# Patient Record
Sex: MALE | Race: WHITE | Employment: OTHER | URBAN - METROPOLITAN AREA
[De-identification: names, ages, dates, MRNs, and addresses within clinical notes are randomized per-mention and may not be internally consistent; named-entity substitution may affect disease eponyms.]

---

## 2017-01-03 ENCOUNTER — HOSPITAL ENCOUNTER (OUTPATIENT)
Dept: WOUND CARE | Age: 67
Discharge: HOME | End: 2017-01-03
Attending: PODIATRIST | Admitting: PODIATRIST

## 2017-01-05 ENCOUNTER — HOSPITAL ENCOUNTER (OUTPATIENT)
Dept: WOUND CARE | Age: 67
Discharge: HOME | End: 2017-01-05
Attending: PODIATRIST | Admitting: PODIATRIST

## 2017-01-05 RX ORDER — LIDOCAINE HYDROCHLORIDE 40 MG/ML
SOLUTION TOPICAL ONCE
Status: COMPLETED | OUTPATIENT
Start: 2017-01-05 | End: 2017-01-05

## 2017-01-05 RX ADMIN — LIDOCAINE HYDROCHLORIDE: 40 SOLUTION TOPICAL at 12:58

## 2017-01-09 ENCOUNTER — HOSPITAL ENCOUNTER (OUTPATIENT)
Dept: WOUND CARE | Age: 67
Discharge: HOME | End: 2017-01-09
Attending: PODIATRIST | Admitting: PODIATRIST

## 2017-01-12 ENCOUNTER — HOSPITAL ENCOUNTER (OUTPATIENT)
Dept: WOUND CARE | Age: 67
Discharge: HOME | End: 2017-01-12
Attending: PODIATRIST | Admitting: PODIATRIST

## 2017-01-12 VITALS
SYSTOLIC BLOOD PRESSURE: 134 MMHG | RESPIRATION RATE: 18 BRPM | HEART RATE: 69 BPM | DIASTOLIC BLOOD PRESSURE: 84 MMHG | TEMPERATURE: 97.9 F

## 2017-01-12 RX ORDER — LIDOCAINE HYDROCHLORIDE 40 MG/ML
SOLUTION TOPICAL ONCE
Status: COMPLETED | OUTPATIENT
Start: 2017-01-12 | End: 2017-01-12

## 2017-01-12 RX ADMIN — LIDOCAINE HYDROCHLORIDE: 40 SOLUTION TOPICAL at 13:29

## 2017-01-12 ASSESSMENT — PAIN DESCRIPTION - DESCRIPTORS: DESCRIPTORS: BURNING

## 2017-01-12 ASSESSMENT — PAIN DESCRIPTION - LOCATION: LOCATION: ANKLE

## 2017-01-12 ASSESSMENT — PAIN DESCRIPTION - ORIENTATION: ORIENTATION: LEFT

## 2017-01-12 ASSESSMENT — PAIN SCALES - GENERAL: PAINLEVEL_OUTOF10: 1

## 2017-01-16 ENCOUNTER — TELEPHONE (OUTPATIENT)
Dept: WOUND CARE | Age: 67
End: 2017-01-16

## 2017-01-19 ENCOUNTER — HOSPITAL ENCOUNTER (OUTPATIENT)
Dept: WOUND CARE | Age: 67
Discharge: HOME | End: 2017-01-19
Attending: PODIATRIST | Admitting: PODIATRIST

## 2017-01-19 VITALS
TEMPERATURE: 98.1 F | DIASTOLIC BLOOD PRESSURE: 85 MMHG | SYSTOLIC BLOOD PRESSURE: 131 MMHG | RESPIRATION RATE: 18 BRPM | HEART RATE: 61 BPM

## 2017-01-19 LAB
GLUCOSE BLD-MCNC: 97 MG/DL (ref 70–99)
PERFORMED ON: NORMAL

## 2017-01-19 RX ORDER — LIDOCAINE HYDROCHLORIDE 40 MG/ML
SOLUTION TOPICAL ONCE
Status: COMPLETED | OUTPATIENT
Start: 2017-01-19 | End: 2017-01-19

## 2017-01-19 RX ORDER — ACETAMINOPHEN 325 MG/1
650 TABLET ORAL EVERY 6 HOURS PRN
COMMUNITY
End: 2021-08-26

## 2017-01-19 RX ADMIN — LIDOCAINE HYDROCHLORIDE: 40 SOLUTION TOPICAL at 13:37

## 2017-01-19 ASSESSMENT — PAIN DESCRIPTION - PAIN TYPE: TYPE: ACUTE PAIN

## 2017-01-19 ASSESSMENT — PAIN DESCRIPTION - DESCRIPTORS: DESCRIPTORS: SHARP;SHOOTING

## 2017-01-19 ASSESSMENT — PAIN DESCRIPTION - ORIENTATION: ORIENTATION: LEFT

## 2017-01-19 ASSESSMENT — PAIN DESCRIPTION - LOCATION: LOCATION: ANKLE

## 2017-01-19 ASSESSMENT — PAIN SCALES - GENERAL: PAINLEVEL_OUTOF10: 10

## 2017-01-20 PROBLEM — L03.116 CELLULITIS OF LEFT ANKLE: Status: ACTIVE | Noted: 2017-01-20

## 2017-01-20 PROBLEM — L97.322 CHRONIC ULCER OF LEFT ANKLE WITH FAT LAYER EXPOSED (HCC): Status: ACTIVE | Noted: 2017-01-20

## 2017-01-23 ENCOUNTER — CARE COORDINATION (OUTPATIENT)
Dept: CASE MANAGEMENT | Age: 67
End: 2017-01-23

## 2017-01-23 ENCOUNTER — TELEPHONE (OUTPATIENT)
Dept: PHARMACY | Facility: CLINIC | Age: 67
End: 2017-01-23

## 2017-01-26 ENCOUNTER — HOSPITAL ENCOUNTER (OUTPATIENT)
Dept: WOUND CARE | Age: 67
Discharge: HOME | End: 2017-01-26
Attending: PODIATRIST | Admitting: PODIATRIST

## 2017-01-26 VITALS
RESPIRATION RATE: 18 BRPM | DIASTOLIC BLOOD PRESSURE: 113 MMHG | SYSTOLIC BLOOD PRESSURE: 178 MMHG | TEMPERATURE: 97.7 F | HEART RATE: 57 BPM

## 2017-01-26 RX ORDER — LIDOCAINE HYDROCHLORIDE 40 MG/ML
SOLUTION TOPICAL ONCE
Status: COMPLETED | OUTPATIENT
Start: 2017-01-26 | End: 2017-01-26

## 2017-01-26 RX ADMIN — LIDOCAINE HYDROCHLORIDE: 40 SOLUTION TOPICAL at 13:15

## 2017-01-26 ASSESSMENT — PAIN DESCRIPTION - PROGRESSION: CLINICAL_PROGRESSION: GRADUALLY IMPROVING

## 2017-01-26 ASSESSMENT — PAIN DESCRIPTION - PAIN TYPE: TYPE: ACUTE PAIN

## 2017-01-26 ASSESSMENT — PAIN DESCRIPTION - FREQUENCY: FREQUENCY: CONTINUOUS

## 2017-01-26 ASSESSMENT — PAIN SCALES - GENERAL: PAINLEVEL_OUTOF10: 4

## 2017-01-26 ASSESSMENT — PAIN DESCRIPTION - LOCATION: LOCATION: ANKLE

## 2017-01-26 ASSESSMENT — PAIN DESCRIPTION - ORIENTATION: ORIENTATION: LEFT

## 2017-01-27 ENCOUNTER — CARE COORDINATION (OUTPATIENT)
Dept: CASE MANAGEMENT | Age: 67
End: 2017-01-27

## 2017-01-30 ENCOUNTER — HOSPITAL ENCOUNTER (OUTPATIENT)
Dept: WOUND CARE | Age: 67
Discharge: HOME | End: 2017-01-30
Attending: PODIATRIST | Admitting: PODIATRIST

## 2017-01-31 ENCOUNTER — CARE COORDINATION (OUTPATIENT)
Dept: CASE MANAGEMENT | Age: 67
End: 2017-01-31

## 2017-02-02 ENCOUNTER — HOSPITAL ENCOUNTER (OUTPATIENT)
Dept: WOUND CARE | Age: 67
Discharge: HOME | End: 2017-02-02
Attending: PODIATRIST | Admitting: PODIATRIST

## 2017-02-02 VITALS
RESPIRATION RATE: 16 BRPM | SYSTOLIC BLOOD PRESSURE: 148 MMHG | TEMPERATURE: 97.7 F | DIASTOLIC BLOOD PRESSURE: 77 MMHG | HEART RATE: 51 BPM

## 2017-02-02 RX ORDER — LIDOCAINE HYDROCHLORIDE 40 MG/ML
SOLUTION TOPICAL ONCE
Status: COMPLETED | OUTPATIENT
Start: 2017-02-02 | End: 2017-02-02

## 2017-02-02 RX ADMIN — LIDOCAINE HYDROCHLORIDE: 40 SOLUTION TOPICAL at 13:17

## 2017-02-02 ASSESSMENT — PAIN DESCRIPTION - PAIN TYPE: TYPE: ACUTE PAIN

## 2017-02-02 ASSESSMENT — PAIN DESCRIPTION - ORIENTATION: ORIENTATION: LEFT

## 2017-02-02 ASSESSMENT — PAIN SCALES - GENERAL: PAINLEVEL_OUTOF10: 4

## 2017-02-02 ASSESSMENT — PAIN DESCRIPTION - ONSET: ONSET: ON-GOING

## 2017-02-02 ASSESSMENT — PAIN DESCRIPTION - PROGRESSION: CLINICAL_PROGRESSION: GRADUALLY IMPROVING

## 2017-02-02 ASSESSMENT — PAIN DESCRIPTION - FREQUENCY: FREQUENCY: INTERMITTENT

## 2017-02-02 ASSESSMENT — PAIN DESCRIPTION - LOCATION: LOCATION: ANKLE

## 2017-02-06 ENCOUNTER — CARE COORDINATION (OUTPATIENT)
Dept: CASE MANAGEMENT | Age: 67
End: 2017-02-06

## 2017-02-09 ENCOUNTER — HOSPITAL ENCOUNTER (OUTPATIENT)
Dept: WOUND CARE | Age: 67
Discharge: HOME | End: 2017-02-09
Attending: PODIATRIST | Admitting: PODIATRIST

## 2017-02-09 VITALS
SYSTOLIC BLOOD PRESSURE: 156 MMHG | TEMPERATURE: 97.8 F | RESPIRATION RATE: 18 BRPM | DIASTOLIC BLOOD PRESSURE: 86 MMHG | HEART RATE: 53 BPM

## 2017-02-09 ASSESSMENT — PAIN DESCRIPTION - ORIENTATION: ORIENTATION: LEFT

## 2017-02-09 ASSESSMENT — PAIN DESCRIPTION - DESCRIPTORS: DESCRIPTORS: BURNING

## 2017-02-09 ASSESSMENT — PAIN DESCRIPTION - ONSET: ONSET: ON-GOING

## 2017-02-09 ASSESSMENT — PAIN DESCRIPTION - PROGRESSION: CLINICAL_PROGRESSION: NOT CHANGED

## 2017-02-09 ASSESSMENT — PAIN DESCRIPTION - FREQUENCY: FREQUENCY: INTERMITTENT

## 2017-02-09 ASSESSMENT — PAIN DESCRIPTION - PAIN TYPE: TYPE: ACUTE PAIN

## 2017-02-09 ASSESSMENT — PAIN SCALES - GENERAL: PAINLEVEL_OUTOF10: 6

## 2017-02-09 ASSESSMENT — PAIN DESCRIPTION - LOCATION: LOCATION: ANKLE

## 2017-02-13 ENCOUNTER — TELEPHONE (OUTPATIENT)
Dept: PULMONOLOGY | Age: 67
End: 2017-02-13

## 2017-03-03 ENCOUNTER — TELEPHONE (OUTPATIENT)
Dept: INTERNAL MEDICINE | Age: 67
End: 2017-03-03

## 2017-03-06 ENCOUNTER — OFFICE VISIT (OUTPATIENT)
Dept: INTERNAL MEDICINE | Age: 67
End: 2017-03-06

## 2017-03-06 VITALS — BODY MASS INDEX: 30 KG/M2 | SYSTOLIC BLOOD PRESSURE: 124 MMHG | DIASTOLIC BLOOD PRESSURE: 80 MMHG | WEIGHT: 253 LBS

## 2017-03-06 DIAGNOSIS — G60.9 IDIOPATHIC PERIPHERAL NEUROPATHY: ICD-10-CM

## 2017-03-06 DIAGNOSIS — G62.9 PERIPHERAL POLYNEUROPATHY: Primary | ICD-10-CM

## 2017-03-06 DIAGNOSIS — L97.529 FOOT ULCER, LEFT, WITH UNSPECIFIED SEVERITY (HCC): ICD-10-CM

## 2017-03-06 DIAGNOSIS — I15.9 SECONDARY HYPERTENSION: ICD-10-CM

## 2017-03-06 PROCEDURE — 99213 OFFICE O/P EST LOW 20 MIN: CPT | Performed by: INTERNAL MEDICINE

## 2017-03-06 RX ORDER — GENTAMICIN SULFATE 1 MG/G
OINTMENT TOPICAL
Qty: 1 TUBE | Refills: 5 | Status: SHIPPED | OUTPATIENT
Start: 2017-03-06 | End: 2017-03-13

## 2017-03-06 RX ORDER — GABAPENTIN 100 MG/1
100 CAPSULE ORAL 3 TIMES DAILY
COMMUNITY
End: 2017-08-25 | Stop reason: CLARIF

## 2017-03-06 RX ORDER — FUROSEMIDE 20 MG/1
20 TABLET ORAL DAILY
Qty: 30 TABLET | Refills: 3 | Status: SHIPPED | OUTPATIENT
Start: 2017-03-06 | End: 2017-08-31 | Stop reason: SDUPTHER

## 2017-03-15 ENCOUNTER — TELEPHONE (OUTPATIENT)
Dept: INTERNAL MEDICINE | Age: 67
End: 2017-03-15

## 2017-03-20 ENCOUNTER — TELEPHONE (OUTPATIENT)
Dept: INTERNAL MEDICINE | Age: 67
End: 2017-03-20

## 2017-05-26 ENCOUNTER — TELEPHONE (OUTPATIENT)
Dept: INTERNAL MEDICINE | Age: 67
End: 2017-05-26

## 2017-05-30 ENCOUNTER — TELEPHONE (OUTPATIENT)
Dept: INTERNAL MEDICINE | Age: 67
End: 2017-05-30

## 2017-06-06 ENCOUNTER — TELEPHONE (OUTPATIENT)
Dept: INTERNAL MEDICINE | Age: 67
End: 2017-06-06

## 2017-06-12 ENCOUNTER — OFFICE VISIT (OUTPATIENT)
Dept: ENT CLINIC | Age: 67
End: 2017-06-12

## 2017-06-12 VITALS
WEIGHT: 261 LBS | TEMPERATURE: 97.6 F | SYSTOLIC BLOOD PRESSURE: 116 MMHG | HEIGHT: 77 IN | BODY MASS INDEX: 30.82 KG/M2 | DIASTOLIC BLOOD PRESSURE: 76 MMHG

## 2017-06-12 DIAGNOSIS — H61.22 IMPACTED CERUMEN OF LEFT EAR: ICD-10-CM

## 2017-06-12 DIAGNOSIS — H60.391 OTHER INFECTIVE ACUTE OTITIS EXTERNA OF RIGHT EAR: Primary | ICD-10-CM

## 2017-06-12 PROCEDURE — 69210 REMOVE IMPACTED EAR WAX UNI: CPT | Performed by: OTOLARYNGOLOGY

## 2017-06-12 PROCEDURE — 99203 OFFICE O/P NEW LOW 30 MIN: CPT | Performed by: OTOLARYNGOLOGY

## 2017-06-19 ENCOUNTER — TELEPHONE (OUTPATIENT)
Dept: INTERNAL MEDICINE | Age: 67
End: 2017-06-19

## 2017-06-28 ENCOUNTER — TELEPHONE (OUTPATIENT)
Dept: INTERNAL MEDICINE | Age: 67
End: 2017-06-28

## 2017-07-21 ENCOUNTER — TELEPHONE (OUTPATIENT)
Dept: INTERNAL MEDICINE | Age: 67
End: 2017-07-21

## 2017-07-21 ENCOUNTER — HOSPITAL ENCOUNTER (OUTPATIENT)
Dept: MRI IMAGING | Age: 67
Discharge: HOME | End: 2017-07-21
Attending: PODIATRIST | Admitting: PODIATRIST

## 2017-07-21 DIAGNOSIS — L97.409: ICD-10-CM

## 2017-07-21 DIAGNOSIS — L98.499 NEUROPATHIC ULCER, WITH UNSPECIFIED SEVERITY (HCC): ICD-10-CM

## 2017-08-23 ENCOUNTER — TELEPHONE (OUTPATIENT)
Dept: INFECTIOUS DISEASES | Age: 67
End: 2017-08-23

## 2017-08-25 ENCOUNTER — OFFICE VISIT (OUTPATIENT)
Dept: INTERNAL MEDICINE | Age: 67
End: 2017-08-25

## 2017-08-25 VITALS
WEIGHT: 260 LBS | HEIGHT: 77 IN | SYSTOLIC BLOOD PRESSURE: 130 MMHG | BODY MASS INDEX: 30.7 KG/M2 | DIASTOLIC BLOOD PRESSURE: 88 MMHG

## 2017-08-25 DIAGNOSIS — H05.032: ICD-10-CM

## 2017-08-25 DIAGNOSIS — E11.628 DIABETIC FOOT INFECTION (HCC): ICD-10-CM

## 2017-08-25 DIAGNOSIS — I15.9 SECONDARY HYPERTENSION: ICD-10-CM

## 2017-08-25 DIAGNOSIS — L03.116 CELLULITIS OF FOOT, LEFT: Primary | ICD-10-CM

## 2017-08-25 DIAGNOSIS — Z01.818 PREOP EXAMINATION: ICD-10-CM

## 2017-08-25 DIAGNOSIS — L97.529 FOOT ULCER, LEFT, WITH UNSPECIFIED SEVERITY (HCC): ICD-10-CM

## 2017-08-25 DIAGNOSIS — L08.9 DIABETIC FOOT INFECTION (HCC): ICD-10-CM

## 2017-08-25 LAB
A/G RATIO: 1.8 (ref 1.1–2.2)
ALBUMIN SERPL-MCNC: 4.9 G/DL (ref 3.4–5)
ALP BLD-CCNC: 61 U/L (ref 40–129)
ALT SERPL-CCNC: 31 U/L (ref 10–40)
ANION GAP SERPL CALCULATED.3IONS-SCNC: 20 MMOL/L (ref 3–16)
AST SERPL-CCNC: 25 U/L (ref 15–37)
BILIRUB SERPL-MCNC: 0.5 MG/DL (ref 0–1)
BUN BLDV-MCNC: 17 MG/DL (ref 7–20)
C-REACTIVE PROTEIN: 22.8 MG/L (ref 0–5.1)
CALCIUM SERPL-MCNC: 9.5 MG/DL (ref 8.3–10.6)
CHLORIDE BLD-SCNC: 96 MMOL/L (ref 99–110)
CO2: 23 MMOL/L (ref 21–32)
CREAT SERPL-MCNC: 1.2 MG/DL (ref 0.8–1.3)
GFR AFRICAN AMERICAN: >60
GFR NON-AFRICAN AMERICAN: >60
GLOBULIN: 2.7 G/DL
GLUCOSE BLD-MCNC: 103 MG/DL (ref 70–99)
POTASSIUM SERPL-SCNC: 4.8 MMOL/L (ref 3.5–5.1)
SEDIMENTATION RATE, ERYTHROCYTE: 16 MM/HR (ref 0–20)
SODIUM BLD-SCNC: 139 MMOL/L (ref 136–145)
TOTAL PROTEIN: 7.6 G/DL (ref 6.4–8.2)

## 2017-08-25 PROCEDURE — 99214 OFFICE O/P EST MOD 30 MIN: CPT | Performed by: INTERNAL MEDICINE

## 2017-08-25 PROCEDURE — 93000 ELECTROCARDIOGRAM COMPLETE: CPT | Performed by: INTERNAL MEDICINE

## 2017-08-26 LAB
BASOPHILS ABSOLUTE: 0.1 K/UL (ref 0–0.2)
BASOPHILS RELATIVE PERCENT: 0.7 %
EOSINOPHILS ABSOLUTE: 0.3 K/UL (ref 0–0.6)
EOSINOPHILS RELATIVE PERCENT: 4 %
HCT VFR BLD CALC: 49.2 % (ref 40.5–52.5)
HEMOGLOBIN: 16.4 G/DL (ref 13.5–17.5)
LYMPHOCYTES ABSOLUTE: 1.3 K/UL (ref 1–5.1)
LYMPHOCYTES RELATIVE PERCENT: 15.8 %
MCH RBC QN AUTO: 31.3 PG (ref 26–34)
MCHC RBC AUTO-ENTMCNC: 33.4 G/DL (ref 31–36)
MCV RBC AUTO: 93.6 FL (ref 80–100)
MONOCYTES ABSOLUTE: 0.6 K/UL (ref 0–1.3)
MONOCYTES RELATIVE PERCENT: 7.9 %
NEUTROPHILS ABSOLUTE: 5.9 K/UL (ref 1.7–7.7)
NEUTROPHILS RELATIVE PERCENT: 71.6 %
PDW BLD-RTO: 14.2 % (ref 12.4–15.4)
PLATELET # BLD: 167 K/UL (ref 135–450)
PMV BLD AUTO: 8.9 FL (ref 5–10.5)
RBC # BLD: 5.26 M/UL (ref 4.2–5.9)
WBC # BLD: 8.2 K/UL (ref 4–11)

## 2017-08-31 DIAGNOSIS — L97.529 FOOT ULCER, LEFT, WITH UNSPECIFIED SEVERITY (HCC): ICD-10-CM

## 2017-08-31 RX ORDER — FUROSEMIDE 20 MG/1
TABLET ORAL
Qty: 30 TABLET | Refills: 2 | Status: SHIPPED | OUTPATIENT
Start: 2017-08-31 | End: 2020-12-30

## 2017-09-05 ENCOUNTER — TELEPHONE (OUTPATIENT)
Dept: INFECTIOUS DISEASES | Age: 67
End: 2017-09-05

## 2017-09-07 ENCOUNTER — HOSPITAL ENCOUNTER (OUTPATIENT)
Dept: PREADMISSION TESTING | Age: 67
Discharge: HOME | End: 2017-09-07
Admitting: PODIATRIST

## 2017-09-09 PROBLEM — M86.472 CHRONIC OSTEOMYELITIS OF LEFT FOOT WITH DRAINING SINUS (HCC): Status: ACTIVE | Noted: 2017-09-09

## 2017-09-09 PROBLEM — M86.479 CHRONIC OSTEOMYELITIS OF FOOT WITH DRAINING SINUS (HCC): Status: ACTIVE | Noted: 2017-09-09

## 2017-09-10 ENCOUNTER — CARE COORDINATION (OUTPATIENT)
Dept: CASE MANAGEMENT | Age: 67
End: 2017-09-10

## 2017-09-11 ENCOUNTER — CARE COORDINATION (OUTPATIENT)
Dept: CASE MANAGEMENT | Age: 67
End: 2017-09-11

## 2017-09-11 ENCOUNTER — TELEPHONE (OUTPATIENT)
Dept: PHARMACY | Facility: CLINIC | Age: 67
End: 2017-09-11

## 2017-09-13 ENCOUNTER — TELEPHONE (OUTPATIENT)
Dept: INFECTIOUS DISEASES | Age: 67
End: 2017-09-13

## 2017-09-19 ENCOUNTER — CARE COORDINATION (OUTPATIENT)
Dept: CASE MANAGEMENT | Age: 67
End: 2017-09-19

## 2017-09-22 ENCOUNTER — OFFICE VISIT (OUTPATIENT)
Dept: INTERNAL MEDICINE | Age: 67
End: 2017-09-22

## 2017-09-22 VITALS — DIASTOLIC BLOOD PRESSURE: 86 MMHG | SYSTOLIC BLOOD PRESSURE: 138 MMHG

## 2017-09-22 DIAGNOSIS — L08.9 LEFT FOOT INFECTION: Primary | ICD-10-CM

## 2017-09-22 PROCEDURE — 99213 OFFICE O/P EST LOW 20 MIN: CPT | Performed by: INTERNAL MEDICINE

## 2017-09-28 PROBLEM — N17.9 AKI (ACUTE KIDNEY INJURY) (HCC): Status: ACTIVE | Noted: 2017-09-28

## 2017-10-04 ENCOUNTER — TELEPHONE (OUTPATIENT)
Dept: INTERNAL MEDICINE | Age: 67
End: 2017-10-04

## 2017-10-04 NOTE — TELEPHONE ENCOUNTER
Grace Goldman calling from [facility]  and can be reached at [phone] ext [phone] Paolo Mccann is going to resubmit form to alt fax at [phone])  Faxed over referral request on [phone] of what needs to be done in  Addition, Grace Goldman  states it is a urgent matter pending pt's d/c from hospital       Patient needs an insurance referral for [facility] ( wound vac company ) regarding  Wound vac supplies   Insurance: [employer]   Please fax referral back to [phone]

## 2017-10-11 ENCOUNTER — TELEPHONE (OUTPATIENT)
Dept: PHARMACY | Facility: CLINIC | Age: 67
End: 2017-10-11

## 2017-10-11 ENCOUNTER — CARE COORDINATION (OUTPATIENT)
Dept: CASE MANAGEMENT | Age: 67
End: 2017-10-11

## 2017-10-11 RX ORDER — AMPICILLIN AND SULBACTAM 2; 1 G/1; G/1
3 INJECTION, POWDER, FOR SOLUTION INTRAMUSCULAR; INTRAVENOUS EVERY 24 HOURS
COMMUNITY
Start: 2017-10-03 | End: 2017-11-09

## 2017-10-11 NOTE — TELEPHONE ENCOUNTER
oxyCODONE-acetaminophen (PERCOCET) 5-325 MG per tablet Take 1 tablet by mouth every 6 hours as needed for Pain . 12 tablet 0    Krill Oil 1000 MG CAPS    *briefly discussed possible duplication/overlap with fish oil - prefers to continue both Take 1 tablet by mouth daily      b complex vitamins capsule Take 1 capsule by mouth daily      Multiple Vitamins-Minerals (SENTRY SENIOR) TABS Take 1 tablet by mouth daily      Omega 3-6-9 Fatty Acids (OMEGA 3-6-9 COMPLEX) CAPS Take 1 tablet by mouth daily      Potassium Gluconate 595 (99 K) MG TABS    *self-started supplement, briefly discussed possibly holding in the short-term with DEBBIE; also on temporary HD Take 1 tablet by mouth daily      zinc gluconate 50 MG tablet Take 50 mg by mouth daily      furosemide (LASIX) 20 MG tablet TAKE ONE TABLET BY MOUTH DAILY 30 tablet 2    acetaminophen (TYLENOL) 325 MG tablet    *discussed [email or web address] APAP total per day Take 650 mg by mouth every 6 hours as needed for Pain       These are the medications you have told us you were taking at home, STOP taking them after you leave the hospital:  Confirms stopping atenolol (expresses frustration that this was changed, stating \"it's always worked well for [facility]"), cipro completed  TCM Call Made?: Yes      Additional Medications:  Denies; does confirm that home health coming today with the Unasyn for administration    Estimated Creatinine Clearance: 16 mL/min (based on SCr of 6.4 mg/dL). Assessment/Plan:  - Medication reconciliation completed. Number of medications reviewed: 11    - Pt is (will be once new meds picked up today) taking medications as directed by discharging physician. Number of discrepancies: 0. Instructions per discharge list provided except per below documentation. Identified medication discrepancies/issues:   · Category 3 (1):  1.  Carvedilol switched to atenolol - patient frustrated with switch and states he prefers atenolol which has worked for him and is one time daily  · Carvedilol 6.25mg received 10/10 AM; otherwise atenolol 25mg given 9/25-10/6 (held 10/7-10/9 d/t low pulse) - unclear to me why it was switched; noted DEBBIE (however atenolol dose \"ok\" with renal dysfunction) and poorly controlled HTN, despite low pulses, with hydralazine added  · Category 4 (1):  1.  Added Unasyn to med list  · Per 10/10 ID note: \"unasyn 3 gm iv daily through 11/9/17\"    - Identified Potential Medication Interactions: No clinically significant interactions identified via [employer] Interaction Analysis as category D or higher.    - Renal Dosing: No renal adjustments necessary.    - Follow up appointment date (7 days for more severe illness, 14 days for others):    · Encouraged to schedule/keep follow up

## 2017-10-11 NOTE — CARE COORDINATION
[facility] Transitions Initial Follow Up Call    Call within 2 business days of discharge: Yes    Patient: Cassidy Horner Patient : 1950   MRN: [id number]   Reason for Admission: left foot infection - DEBBIE - HTN  Discharge Date: 10/10/17 RARS: [facility] Risk Score: 13.5     Spoke with: Benitart: [facility]     Non-face-to-face services provided:  Obtained and reviewed discharge summary and/or continuity of care documents  Communication with home health agencies or other community services the patient is currently using-   Education of patient/family/caregiver/guardian to support self-management-   Assessment and support for treatment adherence and medication management-     Inpatient Assessment  Care Transitions 24 Hour Call    Do you have any ongoing symptoms?:  No  Do you have a copy of your discharge instructions?:  Yes  Do you have all of your prescriptions and are they filled?:  Yes (Comment: Pt reports he now has hydralazine and carvedilol )  Have you been contacted by a [address]?:  Yes  Were you discharged with any Home Care or Post Acute Services:  Yes  Post Acute Services:  Home Health, Other (Comment: reports antibiotic (Amerimed) has been dropped off but [address] ([facility]) has not contacted him)  Patient DME:  Shower chair, Walker, Other  Other Patient DME:  Kneeling scooter  Do you have support at home?:  Alone  Do you feel like you have everything you need to keep you well at home?:  No  Are you an active caregiver in your home?:  No  Care Transitions Interventions     Other Services:  Completed (Comment: CTC called [facility], spoke with Leandro Almendarez and was advised nurse is on her way )        Summary  CTC spoke with the Pt who denies fever, chills, nausea, vomiting, chest pain, SOB, cough, and pain. Pt states he is in a hurry because \"I have a lot on my plate\" and requested the CTC be brief.   Pt reminded Pt of dialysis appt on 10/12 at 9:45 am and provided the address. Pt states that antibiotic has been dropped off but he has not heard from the [address] Pt denies any additional needs. [facility] called [facility] and spoke with Rubio Leong who stated the Pt's nurse is on her way and that she did attempt to reach the Pt early. She stated she was advised to have the Pt antibiotic given after 2 pm since he will have dialysis on Tuesday, Thursday, Saturday mornings. [facility] called the Pt back and advised that his nurse is on her way to administer his antibiotic and encouraged Pt to read over d/c papers again and call if he has any questions. Pt verbalized understanding by stating \"ok'. Follow Up  No future appointments.     Neal Hong RN

## 2017-10-12 ENCOUNTER — CARE COORDINATION (OUTPATIENT)
Dept: CASE MANAGEMENT | Age: 67
End: 2017-10-12

## 2017-10-12 ENCOUNTER — TELEPHONE (OUTPATIENT)
Dept: INFECTIOUS DISEASES | Age: 67
End: 2017-10-12

## 2017-10-12 NOTE — CARE COORDINATION
Call to [facility] to advise of pt concerns and she will f/u. Call to Jad Hernandez and she will call pt about questions pt has. Pt is agreeable to continuing with [facility] but has requested a different nurse. Call to Demar Grey, [facility] to advise.

## 2017-10-12 NOTE — CARE COORDINATION
[facility] Transitions Follow Up Call    10/12/2017    Patient: Tenzin Cheng  Patient : 1950   MRN: [id number]  Reason for Admission: L foot infection with wound vac. Discharge Date: 10/10/17 RARS: Risk Score: 13.5       Spoke with: Lucretia Youssef Transitions Subsequent and Final Call    Subsequent and Final Calls  Do you have any ongoing symptoms?:  No  Have your medications changed?:  No  Do you have any questions related to your medications?:  No  Do you currently have any active services?:  Yes  Are you currently active with any services?:  Home Health, Other  Do you have any needs or concerns that I can assist you with?:  Yes  Patient-reported Needs or Concerns:  Pt requests change in home care companies. Currently has [facility]. Discussed with [facility]. Care Transitions Interventions     Other Services:  Completed (Comment: CTC called [facility], spoke with Mei Ragland and was advised nurse is on her way )   Other Interventions:        Pt calling from [address] where he is currently receiving dialysis. Pt c/oed about home care company who came out and wasn't sure the wound vac was applied correctly. He had other c/o's. He wishes to change home care companies. Call to Rafael Powell, [facility], to discuss. She will see if [facility] can assume the case. Follow Up  No future appointments. [address] Transition Coordinator  [phone]  [email or web address]

## 2017-10-13 ENCOUNTER — CARE COORDINATION (OUTPATIENT)
Dept: CASE MANAGEMENT | Age: 67
End: 2017-10-13

## 2017-10-13 NOTE — CARE COORDINATION
[facility] Transitions Follow Up Call    10/13/2017    Patient: Ramin Phillips  Patient : 1950   MRN: [id number]  Reason for Admission: L foot infection. Discharge Date: 10/10/17 RARS: Risk Score: 13.5    Spoke with: Eliazar Dixon Transitions Subsequent and Final Call    Subsequent and Final Calls  Do you have any ongoing symptoms?:  Yes  Onset of Patient-reported symptoms:  Today  Patient-reported symptoms:  Other  Interventions for patient-reported symptoms:  Notified Home Care  Do you currently have any active services?:  Yes  Are you currently active with any services?:  Home Health  Care Transitions Interventions     Other Services:  Completed (Comment: CTC called [facility], spoke with Kimberly Darling and was advised nurse is on her way )   Other Interventions:        Pt called Care Transition nurse to report he hooked up his IV and it won't run. Asked if he has his line and IV line unclamped and he said yes. Asked if he flushed the line before connecting and he said yes and it flushed fine. Asked if it would run by changing positions and he said no. Advised would contact [facility] for nurse to call and see if they can trouble shoot over the phone otherwise a nurse will come out. Pt agreed. Call to Kimberly Darling at [facility] to advise pt pt problem as noted. She will contact Aron Richard his nurse to call and trouble shoot and visit if needed. Call back to pt to advise. He said he found the phone number for [facility]. Advised to call if no call back in next 30 min. Pt agreed. Follow Up  Future Appointments  Date Time Provider Lucretia Dee   2017 2:20 PM Damian Capps MD [address] [place] [address] Mario Darden [place] Transition Coordinator  [phone]  [email or web address]

## 2017-10-13 NOTE — CARE COORDINATION
[facility] Transitions Follow Up Call    10/13/2017    Patient: Myla Reaves  Patient : 1950   MRN: [id number]  Reason for Admission: L foot infection  Discharge Date: 10/10/17 RARS: Risk Score: 13.5     Spoke with: Eliazar Laielmagilberto Dixon Transitions Subsequent and Final Call    Subsequent and Final Calls  Do you have any ongoing symptoms?:  No  Have your medications changed?:  No  Do you have any questions related to your medications?:  No  Do you currently have any active services?:  Yes  Are you currently active with any services?:  Home Health  Do you have any needs or concerns that I can assist you with?:  No  Identified Barriers: Other  Care Transitions Interventions     Other Services:  Completed (Comment: CTC called [facility], spoke with Maria Del Rosraio Olivier and was advised nurse is on her way )   Other Interventions:        Care Transition f/u call to pt. He said the new nurse came out last night and he was very pleased. She showed him how to do his IV atb infusion and he said he is 100% on that. He said she looked at the wound vac and thought it was functioning as expected pulling some drainage. He said she is coming back out today to change the wound vac. Discuss need for f/u appt. He said Dr Wild's office called and he needs to call back which he said he would do today to get scheduled. Follow Up  Future Appointments  Date Time Provider Lucretia Dee   2017 2:20 PM Ramo Valdez MD [address] [place] [address] MarioJesus Manuel Darden [place] Transition Coordinator  [phone]  [email or web address]

## 2017-10-16 ENCOUNTER — CARE COORDINATION (OUTPATIENT)
Dept: CASE MANAGEMENT | Age: 67
End: 2017-10-16

## 2017-10-17 ENCOUNTER — TELEPHONE (OUTPATIENT)
Dept: INFECTIOUS DISEASES | Age: 67
End: 2017-10-17

## 2017-10-17 DIAGNOSIS — E11.628 DIABETIC FOOT INFECTION (HCC): Primary | ICD-10-CM

## 2017-10-17 DIAGNOSIS — L08.9 DIABETIC FOOT INFECTION (HCC): Primary | ICD-10-CM

## 2017-10-17 LAB
A/G RATIO: 1.6 (ref 1.1–2.2)
ALBUMIN SERPL-MCNC: 4.9 G/DL (ref 3.4–5)
ALP BLD-CCNC: 82 U/L (ref 40–129)
ALT SERPL-CCNC: 49 U/L (ref 10–40)
ANION GAP SERPL CALCULATED.3IONS-SCNC: 19 MMOL/L (ref 3–16)
AST SERPL-CCNC: 41 U/L (ref 15–37)
BILIRUB SERPL-MCNC: 0.7 MG/DL (ref 0–1)
BUN BLDV-MCNC: 29 MG/DL (ref 7–20)
C-REACTIVE PROTEIN: 15.1 MG/L (ref 0–5.1)
CALCIUM SERPL-MCNC: 9.2 MG/DL (ref 8.3–10.6)
CHLORIDE BLD-SCNC: 101 MMOL/L (ref 99–110)
CO2: 22 MMOL/L (ref 21–32)
CREAT SERPL-MCNC: 2.3 MG/DL (ref 0.8–1.3)
GFR AFRICAN AMERICAN: 34
GFR NON-AFRICAN AMERICAN: 28
GLOBULIN: 3 G/DL
GLUCOSE BLD-MCNC: 128 MG/DL (ref 70–99)
POTASSIUM SERPL-SCNC: 4.8 MMOL/L (ref 3.5–5.1)
SODIUM BLD-SCNC: 142 MMOL/L (ref 136–145)
TOTAL PROTEIN: 7.9 G/DL (ref 6.4–8.2)

## 2017-10-19 ENCOUNTER — CARE COORDINATION (OUTPATIENT)
Dept: CASE MANAGEMENT | Age: 67
End: 2017-10-19

## 2017-10-19 NOTE — CARE COORDINATION
[facility] Transitions Follow Up Call    10/19/2017    Patient: Tenzin Cheng  Patient : 1950   MRN: [id number]  Reason for Admission: L foot infection  Discharge Date: 10/10/17 RARS: Risk Score: 13.5       Spoke with: Lucretia Youssef Transitions Subsequent and Final Call    Subsequent and Final Calls  Do you have any ongoing symptoms?:  No  Have your medications changed?:  No  Do you have any questions related to your medications?:  No  Do you currently have any active services?:  Yes  Are you currently active with any services?:  Home Health  Do you have any needs or concerns that I can assist you with?:  No  Identified Barriers: Other  Care Transitions Interventions     Other Services:  Completed (Comment: CTC called [facility], spoke with Mei Ragland and was advised nurse is on her way )   Other Interventions:        Care Transition f/u call to pt. He said he is doing well. He said his labs showed his creatinine was down to 2.3 so Dr Michelle Parikh released him from dialysis. He had a question about when his line would be removed and advised pt to contact Dr Michelle Parikh about that and he agreed. Pt also said Dr Jarod Cosby is making a home visit tomorrow with the home care nurse to check the wound while the vac is off. Follow Up  Future Appointments  Date Time Provider Lucretia Dee   2017 2:20 PM Bong Forman MD [address] [place] [address] Ruben Darden [place] Transition Coordinator  [phone]  [email or web address]

## 2017-10-23 LAB
A/G RATIO: 1.6 (ref 1.1–2.2)
ALBUMIN SERPL-MCNC: 4.4 G/DL (ref 3.4–5)
ALP BLD-CCNC: 82 U/L (ref 40–129)
ALT SERPL-CCNC: 58 U/L (ref 10–40)
ANION GAP SERPL CALCULATED.3IONS-SCNC: 19 MMOL/L (ref 3–16)
AST SERPL-CCNC: 48 U/L (ref 15–37)
BILIRUB SERPL-MCNC: 0.3 MG/DL (ref 0–1)
BUN BLDV-MCNC: 49 MG/DL (ref 7–20)
C-REACTIVE PROTEIN: 11.9 MG/L (ref 0–5.1)
CALCIUM SERPL-MCNC: 8.9 MG/DL (ref 8.3–10.6)
CHLORIDE BLD-SCNC: 104 MMOL/L (ref 99–110)
CO2: 20 MMOL/L (ref 21–32)
CREAT SERPL-MCNC: 2.3 MG/DL (ref 0.8–1.3)
GFR AFRICAN AMERICAN: 34
GFR NON-AFRICAN AMERICAN: 28
GLOBULIN: 2.8 G/DL
GLUCOSE BLD-MCNC: 73 MG/DL (ref 70–99)
POTASSIUM SERPL-SCNC: 4.8 MMOL/L (ref 3.5–5.1)
SODIUM BLD-SCNC: 143 MMOL/L (ref 136–145)
TOTAL PROTEIN: 7.2 G/DL (ref 6.4–8.2)

## 2017-10-24 ENCOUNTER — CARE COORDINATION (OUTPATIENT)
Dept: CASE MANAGEMENT | Age: 67
End: 2017-10-24

## 2017-10-24 LAB — SEDIMENTATION RATE, ERYTHROCYTE: 17 MM/HR (ref 0–20)

## 2017-10-27 ENCOUNTER — HOSPITAL ENCOUNTER (OUTPATIENT)
Dept: CARDIAC CATH/INVASIVE PROCEDURES | Age: 67
Discharge: HOME | End: 2017-10-27
Attending: INTERNAL MEDICINE | Admitting: INTERNAL MEDICINE

## 2017-10-27 DIAGNOSIS — N18.9: ICD-10-CM

## 2017-10-30 LAB
BASOPHILS ABSOLUTE: 0.1 K/UL (ref 0–0.2)
BASOPHILS RELATIVE PERCENT: 0.9 %
EOSINOPHILS ABSOLUTE: 0.2 K/UL (ref 0–0.6)
EOSINOPHILS RELATIVE PERCENT: 3.5 %
HCT VFR BLD CALC: 34.4 % (ref 40.5–52.5)
HEMOGLOBIN: 11.5 G/DL (ref 13.5–17.5)
LYMPHOCYTES ABSOLUTE: 1.5 K/UL (ref 1–5.1)
LYMPHOCYTES RELATIVE PERCENT: 26.8 %
MCH RBC QN AUTO: 30.9 PG (ref 26–34)
MCHC RBC AUTO-ENTMCNC: 33.4 G/DL (ref 31–36)
MCV RBC AUTO: 92.4 FL (ref 80–100)
MONOCYTES ABSOLUTE: 0.6 K/UL (ref 0–1.3)
MONOCYTES RELATIVE PERCENT: 10.3 %
NEUTROPHILS ABSOLUTE: 3.3 K/UL (ref 1.7–7.7)
NEUTROPHILS RELATIVE PERCENT: 58.5 %
PDW BLD-RTO: 15.9 % (ref 12.4–15.4)
PLATELET # BLD: 149 K/UL (ref 135–450)
PMV BLD AUTO: 8.8 FL (ref 5–10.5)
RBC # BLD: 3.72 M/UL (ref 4.2–5.9)
WBC # BLD: 5.6 K/UL (ref 4–11)

## 2017-10-31 LAB
A/G RATIO: 1.7 (ref 1.1–2.2)
ALBUMIN SERPL-MCNC: 4.2 G/DL (ref 3.4–5)
ALP BLD-CCNC: 81 U/L (ref 40–129)
ALT SERPL-CCNC: 60 U/L (ref 10–40)
ANION GAP SERPL CALCULATED.3IONS-SCNC: 17 MMOL/L (ref 3–16)
AST SERPL-CCNC: 44 U/L (ref 15–37)
BILIRUB SERPL-MCNC: <0.2 MG/DL (ref 0–1)
BUN BLDV-MCNC: 34 MG/DL (ref 7–20)
C-REACTIVE PROTEIN: 8.2 MG/L (ref 0–5.1)
CALCIUM SERPL-MCNC: 9.4 MG/DL (ref 8.3–10.6)
CHLORIDE BLD-SCNC: 102 MMOL/L (ref 99–110)
CO2: 23 MMOL/L (ref 21–32)
CREAT SERPL-MCNC: 1.9 MG/DL (ref 0.8–1.3)
GFR AFRICAN AMERICAN: 43
GFR NON-AFRICAN AMERICAN: 35
GLOBULIN: 2.5 G/DL
GLUCOSE BLD-MCNC: 106 MG/DL (ref 70–99)
POTASSIUM SERPL-SCNC: 4.7 MMOL/L (ref 3.5–5.1)
SEDIMENTATION RATE, ERYTHROCYTE: 15 MM/HR (ref 0–20)
SODIUM BLD-SCNC: 142 MMOL/L (ref 136–145)
TOTAL PROTEIN: 6.7 G/DL (ref 6.4–8.2)

## 2017-11-02 NOTE — H&P
Patient:                      Xiang Rangel                     :                                   1950        Relevant patient history reviewed and discussed.     CC:  Remove tunneled dialysis catheter    Light sedation planned     The procedure including risks and benefits was discussed at length with the patient (or designated family member) and all questions were answered. Informed consent to proceed with the procedure was given.     Condition : stable     [facility] nurses notes reviewed and agreed. Medications reviewed. Allergies:         Allergies   Allergen Reactions    Clindamycin/Lincomycin Rash      Cleared for procedure  Light sedation planned  Less than 5 minutes

## 2017-11-03 ENCOUNTER — TELEPHONE (OUTPATIENT)
Dept: INFECTIOUS DISEASES | Age: 67
End: 2017-11-03

## 2017-11-06 LAB
A/G RATIO: 1.6 (ref 1.1–2.2)
ALBUMIN SERPL-MCNC: 4.4 G/DL (ref 3.4–5)
ALP BLD-CCNC: 72 U/L (ref 40–129)
ALT SERPL-CCNC: 56 U/L (ref 10–40)
ANION GAP SERPL CALCULATED.3IONS-SCNC: 21 MMOL/L (ref 3–16)
AST SERPL-CCNC: 39 U/L (ref 15–37)
BASOPHILS ABSOLUTE: 0 K/UL (ref 0–0.2)
BASOPHILS RELATIVE PERCENT: 0.8 %
BILIRUB SERPL-MCNC: 0.3 MG/DL (ref 0–1)
BUN BLDV-MCNC: 35 MG/DL (ref 7–20)
C-REACTIVE PROTEIN: 10.1 MG/L (ref 0–5.1)
CALCIUM SERPL-MCNC: 9.3 MG/DL (ref 8.3–10.6)
CHLORIDE BLD-SCNC: 102 MMOL/L (ref 99–110)
CO2: 20 MMOL/L (ref 21–32)
CREAT SERPL-MCNC: 1.8 MG/DL (ref 0.8–1.3)
EOSINOPHILS ABSOLUTE: 0.2 K/UL (ref 0–0.6)
EOSINOPHILS RELATIVE PERCENT: 2.7 %
GFR AFRICAN AMERICAN: 46
GFR NON-AFRICAN AMERICAN: 38
GLOBULIN: 2.7 G/DL
GLUCOSE BLD-MCNC: 84 MG/DL (ref 70–99)
HCT VFR BLD CALC: 36.2 % (ref 40.5–52.5)
HEMOGLOBIN: 12 G/DL (ref 13.5–17.5)
LYMPHOCYTES ABSOLUTE: 1.5 K/UL (ref 1–5.1)
LYMPHOCYTES RELATIVE PERCENT: 26.9 %
MCH RBC QN AUTO: 30.5 PG (ref 26–34)
MCHC RBC AUTO-ENTMCNC: 33.2 G/DL (ref 31–36)
MCV RBC AUTO: 91.9 FL (ref 80–100)
MONOCYTES ABSOLUTE: 0.5 K/UL (ref 0–1.3)
MONOCYTES RELATIVE PERCENT: 9.4 %
NEUTROPHILS ABSOLUTE: 3.4 K/UL (ref 1.7–7.7)
NEUTROPHILS RELATIVE PERCENT: 60.2 %
PDW BLD-RTO: 16 % (ref 12.4–15.4)
PLATELET # BLD: 154 K/UL (ref 135–450)
PMV BLD AUTO: 9.2 FL (ref 5–10.5)
POTASSIUM SERPL-SCNC: 4.9 MMOL/L (ref 3.5–5.1)
RBC # BLD: 3.94 M/UL (ref 4.2–5.9)
SODIUM BLD-SCNC: 143 MMOL/L (ref 136–145)
TOTAL PROTEIN: 7.1 G/DL (ref 6.4–8.2)
WBC # BLD: 5.6 K/UL (ref 4–11)

## 2017-11-07 LAB — SEDIMENTATION RATE, ERYTHROCYTE: 18 MM/HR (ref 0–20)

## 2017-11-13 LAB
A/G RATIO: 1.7 (ref 1.1–2.2)
ALBUMIN SERPL-MCNC: 4.5 G/DL (ref 3.4–5)
ALP BLD-CCNC: 64 U/L (ref 40–129)
ALT SERPL-CCNC: 56 U/L (ref 10–40)
ANION GAP SERPL CALCULATED.3IONS-SCNC: 21 MMOL/L (ref 3–16)
AST SERPL-CCNC: 45 U/L (ref 15–37)
BASOPHILS ABSOLUTE: 0 K/UL (ref 0–0.2)
BASOPHILS RELATIVE PERCENT: 0.6 %
BILIRUB SERPL-MCNC: 0.3 MG/DL (ref 0–1)
BUN BLDV-MCNC: 45 MG/DL (ref 7–20)
CALCIUM SERPL-MCNC: 9.2 MG/DL (ref 8.3–10.6)
CHLORIDE BLD-SCNC: 98 MMOL/L (ref 99–110)
CO2: 21 MMOL/L (ref 21–32)
CREAT SERPL-MCNC: 1.6 MG/DL (ref 0.8–1.3)
EOSINOPHILS ABSOLUTE: 0.2 K/UL (ref 0–0.6)
EOSINOPHILS RELATIVE PERCENT: 2.3 %
GFR AFRICAN AMERICAN: 52
GFR NON-AFRICAN AMERICAN: 43
GLOBULIN: 2.7 G/DL
GLUCOSE BLD-MCNC: 71 MG/DL (ref 70–99)
HCT VFR BLD CALC: 38 % (ref 40.5–52.5)
HEMOGLOBIN: 12.5 G/DL (ref 13.5–17.5)
LYMPHOCYTES ABSOLUTE: 1.9 K/UL (ref 1–5.1)
LYMPHOCYTES RELATIVE PERCENT: 23.8 %
MCH RBC QN AUTO: 30.6 PG (ref 26–34)
MCHC RBC AUTO-ENTMCNC: 32.9 G/DL (ref 31–36)
MCV RBC AUTO: 92.9 FL (ref 80–100)
MONOCYTES ABSOLUTE: 0.8 K/UL (ref 0–1.3)
MONOCYTES RELATIVE PERCENT: 9.6 %
NEUTROPHILS ABSOLUTE: 5 K/UL (ref 1.7–7.7)
NEUTROPHILS RELATIVE PERCENT: 63.7 %
PDW BLD-RTO: 15.6 % (ref 12.4–15.4)
PLATELET # BLD: 161 K/UL (ref 135–450)
PMV BLD AUTO: 9.3 FL (ref 5–10.5)
POTASSIUM SERPL-SCNC: 4.2 MMOL/L (ref 3.5–5.1)
RBC # BLD: 4.09 M/UL (ref 4.2–5.9)
SEDIMENTATION RATE, ERYTHROCYTE: 19 MM/HR (ref 0–20)
SODIUM BLD-SCNC: 140 MMOL/L (ref 136–145)
TOTAL PROTEIN: 7.2 G/DL (ref 6.4–8.2)
WBC # BLD: 7.9 K/UL (ref 4–11)

## 2017-11-14 LAB — C-REACTIVE PROTEIN: 8.1 MG/L (ref 0–5.1)

## 2017-11-20 LAB
A/G RATIO: 1.5 (ref 1.1–2.2)
ALBUMIN SERPL-MCNC: 4.3 G/DL (ref 3.4–5)
ALP BLD-CCNC: 75 U/L (ref 40–129)
ALT SERPL-CCNC: 53 U/L (ref 10–40)
ANION GAP SERPL CALCULATED.3IONS-SCNC: 20 MMOL/L (ref 3–16)
AST SERPL-CCNC: 34 U/L (ref 15–37)
BASOPHILS ABSOLUTE: 0.1 K/UL (ref 0–0.2)
BASOPHILS RELATIVE PERCENT: 0.9 %
BILIRUB SERPL-MCNC: 0.3 MG/DL (ref 0–1)
BUN BLDV-MCNC: 30 MG/DL (ref 7–20)
C-REACTIVE PROTEIN: 11.6 MG/L (ref 0–5.1)
CALCIUM SERPL-MCNC: 9.6 MG/DL (ref 8.3–10.6)
CHLORIDE BLD-SCNC: 100 MMOL/L (ref 99–110)
CO2: 23 MMOL/L (ref 21–32)
CREAT SERPL-MCNC: 1.5 MG/DL (ref 0.8–1.3)
EOSINOPHILS ABSOLUTE: 0.2 K/UL (ref 0–0.6)
EOSINOPHILS RELATIVE PERCENT: 3.3 %
GFR AFRICAN AMERICAN: 56
GFR NON-AFRICAN AMERICAN: 46
GLOBULIN: 2.9 G/DL
GLUCOSE BLD-MCNC: 95 MG/DL (ref 70–99)
HCT VFR BLD CALC: 37.4 % (ref 40.5–52.5)
HEMOGLOBIN: 12.8 G/DL (ref 13.5–17.5)
LYMPHOCYTES ABSOLUTE: 1.8 K/UL (ref 1–5.1)
LYMPHOCYTES RELATIVE PERCENT: 30.3 %
MCH RBC QN AUTO: 31.2 PG (ref 26–34)
MCHC RBC AUTO-ENTMCNC: 34.1 G/DL (ref 31–36)
MCV RBC AUTO: 91.3 FL (ref 80–100)
MONOCYTES ABSOLUTE: 0.5 K/UL (ref 0–1.3)
MONOCYTES RELATIVE PERCENT: 8.2 %
NEUTROPHILS ABSOLUTE: 3.4 K/UL (ref 1.7–7.7)
NEUTROPHILS RELATIVE PERCENT: 57.3 %
PDW BLD-RTO: 15.4 % (ref 12.4–15.4)
PLATELET # BLD: 168 K/UL (ref 135–450)
PMV BLD AUTO: 9 FL (ref 5–10.5)
POTASSIUM SERPL-SCNC: 4.4 MMOL/L (ref 3.5–5.1)
RBC # BLD: 4.1 M/UL (ref 4.2–5.9)
SEDIMENTATION RATE, ERYTHROCYTE: 15 MM/HR (ref 0–20)
SODIUM BLD-SCNC: 143 MMOL/L (ref 136–145)
TOTAL PROTEIN: 7.2 G/DL (ref 6.4–8.2)
WBC # BLD: 6 K/UL (ref 4–11)

## 2017-11-20 RX ORDER — ATENOLOL 25 MG/1
TABLET ORAL
Qty: 90 TABLET | Refills: 3 | Status: SHIPPED | OUTPATIENT
Start: 2017-11-20 | End: 2018-10-16 | Stop reason: SDUPTHER

## 2017-11-27 ENCOUNTER — TELEPHONE (OUTPATIENT)
Dept: INFECTIOUS DISEASES | Age: 67
End: 2017-11-27

## 2017-11-27 LAB
A/G RATIO: 1.6 (ref 1.1–2.2)
ALBUMIN SERPL-MCNC: 4.4 G/DL (ref 3.4–5)
ALP BLD-CCNC: 70 U/L (ref 40–129)
ALT SERPL-CCNC: 61 U/L (ref 10–40)
ANION GAP SERPL CALCULATED.3IONS-SCNC: 19 MMOL/L (ref 3–16)
AST SERPL-CCNC: 47 U/L (ref 15–37)
BASOPHILS ABSOLUTE: 0.1 K/UL (ref 0–0.2)
BASOPHILS RELATIVE PERCENT: 0.8 %
BILIRUB SERPL-MCNC: 0.4 MG/DL (ref 0–1)
BUN BLDV-MCNC: 25 MG/DL (ref 7–20)
C-REACTIVE PROTEIN: 11 MG/L (ref 0–5.1)
CALCIUM SERPL-MCNC: 9.3 MG/DL (ref 8.3–10.6)
CHLORIDE BLD-SCNC: 101 MMOL/L (ref 99–110)
CO2: 21 MMOL/L (ref 21–32)
CREAT SERPL-MCNC: 1.3 MG/DL (ref 0.8–1.3)
EOSINOPHILS ABSOLUTE: 0.2 K/UL (ref 0–0.6)
EOSINOPHILS RELATIVE PERCENT: 2.2 %
GFR AFRICAN AMERICAN: >60
GFR NON-AFRICAN AMERICAN: 55
GLOBULIN: 2.7 G/DL
GLUCOSE BLD-MCNC: 77 MG/DL (ref 70–99)
HCT VFR BLD CALC: 38.3 % (ref 40.5–52.5)
HEMOGLOBIN: 12.8 G/DL (ref 13.5–17.5)
LYMPHOCYTES ABSOLUTE: 1.7 K/UL (ref 1–5.1)
LYMPHOCYTES RELATIVE PERCENT: 20.1 %
MCH RBC QN AUTO: 30.7 PG (ref 26–34)
MCHC RBC AUTO-ENTMCNC: 33.3 G/DL (ref 31–36)
MCV RBC AUTO: 92.1 FL (ref 80–100)
MONOCYTES ABSOLUTE: 0.6 K/UL (ref 0–1.3)
MONOCYTES RELATIVE PERCENT: 7.4 %
NEUTROPHILS ABSOLUTE: 5.9 K/UL (ref 1.7–7.7)
NEUTROPHILS RELATIVE PERCENT: 69.5 %
PDW BLD-RTO: 15.7 % (ref 12.4–15.4)
PLATELET # BLD: 173 K/UL (ref 135–450)
PMV BLD AUTO: 9.4 FL (ref 5–10.5)
POTASSIUM SERPL-SCNC: 4.3 MMOL/L (ref 3.5–5.1)
RBC # BLD: 4.16 M/UL (ref 4.2–5.9)
SEDIMENTATION RATE, ERYTHROCYTE: 6 MM/HR (ref 0–20)
SODIUM BLD-SCNC: 141 MMOL/L (ref 136–145)
TOTAL PROTEIN: 7.1 G/DL (ref 6.4–8.2)
WBC # BLD: 8.5 K/UL (ref 4–11)

## 2017-11-27 NOTE — TELEPHONE ENCOUNTER
Nurse called from home care. You ended atb. Would like to know if you want to maintain labs with picc because he's a hard stick. She's going today.   Would like orders to maintain or pull  [phone]

## 2017-11-28 ENCOUNTER — TELEPHONE (OUTPATIENT)
Dept: INFECTIOUS DISEASES | Age: 67
End: 2017-11-28

## 2017-11-28 NOTE — TELEPHONE ENCOUNTER
This nurse contacted nurse with patient and gave your message she said they had contacted the foot doc and he said whatever you said.   She also stated pt has slight fever for him

## 2017-11-29 ENCOUNTER — TELEPHONE (OUTPATIENT)
Dept: INFECTIOUS DISEASES | Age: 67
End: 2017-11-29

## 2017-11-30 ENCOUNTER — OFFICE VISIT (OUTPATIENT)
Dept: INFECTIOUS DISEASES | Age: 67
End: 2017-11-30

## 2017-11-30 VITALS — DIASTOLIC BLOOD PRESSURE: 86 MMHG | TEMPERATURE: 97.9 F | SYSTOLIC BLOOD PRESSURE: 136 MMHG

## 2017-11-30 DIAGNOSIS — L97.529 FOOT ULCER, LEFT, WITH UNSPECIFIED SEVERITY (HCC): ICD-10-CM

## 2017-11-30 DIAGNOSIS — M86.672 CHRONIC OSTEOMYELITIS OF ANKLE AND FOOT, LEFT (HCC): Primary | ICD-10-CM

## 2017-11-30 PROCEDURE — 99215 OFFICE O/P EST HI 40 MIN: CPT | Performed by: INTERNAL MEDICINE

## 2017-11-30 PROCEDURE — 1123F ACP DISCUSS/DSCN MKR DOCD: CPT | Performed by: INTERNAL MEDICINE

## 2017-11-30 PROCEDURE — 1036F TOBACCO NON-USER: CPT | Performed by: INTERNAL MEDICINE

## 2017-11-30 PROCEDURE — 3017F COLORECTAL CA SCREEN DOC REV: CPT | Performed by: INTERNAL MEDICINE

## 2017-11-30 PROCEDURE — 4040F PNEUMOC VAC/ADMIN/RCVD: CPT | Performed by: INTERNAL MEDICINE

## 2017-11-30 PROCEDURE — G8484 FLU IMMUNIZE NO ADMIN: HCPCS | Performed by: INTERNAL MEDICINE

## 2017-11-30 PROCEDURE — G8417 CALC BMI ABV UP PARAM F/U: HCPCS | Performed by: INTERNAL MEDICINE

## 2017-11-30 PROCEDURE — G8427 DOCREV CUR MEDS BY ELIG CLIN: HCPCS | Performed by: INTERNAL MEDICINE

## 2017-11-30 NOTE — PROGRESS NOTES
History:    Past Surgical History:   Procedure Laterality Date    FOOT SURGERY Bilateral 10/8/2015    left foot bone biopsy X 2, bilat wound debridement, bilat. amnio graft    FOOT SURGERY Right 09/13/2016    TRANSMETATARSAL AMPUTATION RIGHT FOOT;    FOOT SURGERY Left 09/25/2017    INCISION AND DRAINAGE WITH EXCISION OF ALL NECROTIC SOFT TISSUE AND BONE, LEFT FOOT WITH APPLICATION OF WOUND VAC    OTHER SURGICAL HISTORY  09/11/2016    PARTIAL FIRST RAY TOE AMPUTATION - RIGHT FOOT (VANCOMYCIN    OTHER SURGICAL HISTORY Left 09/08/2017     TRANSMETATARSAL AMPUTATION LEFT FOOT ;    OTHER SURGICAL HISTORY Left 09/27/2017    INCISION AND DRAINAGE OF NECROTIC TISSUE AND BONE WITH RE-    OTHER SURGICAL HISTORY Left 09/30/2017    RESECTION FIRST METATARSAL WITH PARTIAL RESECTION SECOND,    TONSILLECTOMY         Current Medications:    Current Outpatient Prescriptions   Medication Sig Dispense Refill    atenolol (TENORMIN) 25 MG tablet TAKE 1 TABLET EVERY DAY 90 tablet 3    hydrALAZINE (APRESOLINE) 100 MG tablet Take 1 tablet by mouth 3 times daily 90 tablet 1    carvedilol (COREG) 6.25 MG tablet Take 1 tablet by mouth 2 times daily (with meals) 60 tablet 1    Krill Oil 1000 MG CAPS Take 1 tablet by mouth daily      b complex vitamins capsule Take 1 capsule by mouth daily      Multiple Vitamins-Minerals (SENTRY SENIOR) TABS Take 1 tablet by mouth daily      Omega 3-6-9 Fatty Acids (OMEGA 3-6-9 COMPLEX) CAPS Take 1 tablet by mouth daily      Potassium Gluconate 595 (99 K) MG TABS Take 1 tablet by mouth daily      zinc gluconate 50 MG tablet Take 50 mg by mouth daily      furosemide (LASIX) 20 MG tablet TAKE ONE TABLET BY MOUTH DAILY 30 tablet 2    acetaminophen (TYLENOL) 325 MG tablet Take 650 mg by mouth every 6 hours as needed for Pain       No current facility-administered medications for this visit.         Allergies:  Clindamycin/lincomycin    Social History:    TOBACCO:                 None - past cigarette, quit 4/11/15  ETOH:                         +   DRUGS:                      None   MARITAL STATUS:     Single    Family History:   No immunodeficiency    REVIEW OF SYSTEMS:    No fever / chills / sweats. No weight loss. No visual change, eye pain, eye discharge. No oral lesion, sore throat, dysphagia. Denies cough / sputum. Denies chest pain, palpitations. Denies n / v / abd pain. No diarrhea. Denies dysuria or change in urinary function. Denies joint swelling or pain. No myalgia, arthralgia. Denies skin change, rash, itching  Denies focal weakness, sensory change or other neurologic symptom  Denies new depression or other psychiatric problem. No symptoms endocrine disorder. No symptoms hematologic disorder. PHYSICAL EXAM:    Vitals:    /86   Temp 97.9 °F (36.6 °C) (Oral)     GENERAL: No apparent distress.     HEENT: Membranes moist, no oral lesion  NECK:  Supple  LYMPH: No adenopathy   LUNGS: Clear b/l, no rales, no dullness  CARDIAC: RRR, no murmur appreciated  ABD:  + BS, soft / NT  EXT:  No rash, no edema, no lesions  NEURO: No focal neurologic findings  PSYCH: Orientation, sensorium, mood normal      DATA:    See EPIC    MICRO:  9/8                 Surgical cx Pseudomonas R cefepime, cipro, meropenem, pip  9/22               BC x2 no growth                        Wound cx heavy growth Ps aeruginosa, heavy Enterococci faecalis [#[id number]]         PSEUDOMONAS AERUGINOSA      Antibiotic Interpretation [facility] Unit     cefepime Resistant >16 mcg/mL     ciprofloxacin Resistant >2 mcg/mL     gentamicin Sensitive <=4 mcg/mL     meropenem Resistant >8 mcg/mL     piperacillin-tazobactam Resistant >64 mcg/mL     tobramycin Sensitive <=4 mcg/mL        ENTEROCOCCUS FAECALIS      Antibiotic Interpretation [facility] Unit     ampicillin Sensitive <=2 mcg/mL     vancomycin Sensitive 2 mcg/mL      9/24                 Surg cult - GS no WBC, no organism, culture - rare Ps aeruginosa, rare E faecalis,

## 2017-12-05 LAB
A/G RATIO: 1.7 (ref 1.1–2.2)
ALBUMIN SERPL-MCNC: 4.5 G/DL (ref 3.4–5)
ALP BLD-CCNC: 64 U/L (ref 40–129)
ALT SERPL-CCNC: 41 U/L (ref 10–40)
ANION GAP SERPL CALCULATED.3IONS-SCNC: 16 MMOL/L (ref 3–16)
AST SERPL-CCNC: 33 U/L (ref 15–37)
BASOPHILS ABSOLUTE: 0 K/UL (ref 0–0.2)
BASOPHILS RELATIVE PERCENT: 0.8 %
BILIRUB SERPL-MCNC: 0.3 MG/DL (ref 0–1)
BUN BLDV-MCNC: 21 MG/DL (ref 7–20)
C-REACTIVE PROTEIN: 16.9 MG/L (ref 0–5.1)
CALCIUM SERPL-MCNC: 9.5 MG/DL (ref 8.3–10.6)
CHLORIDE BLD-SCNC: 103 MMOL/L (ref 99–110)
CO2: 21 MMOL/L (ref 21–32)
CREAT SERPL-MCNC: 1.4 MG/DL (ref 0.8–1.3)
EOSINOPHILS ABSOLUTE: 0.2 K/UL (ref 0–0.6)
EOSINOPHILS RELATIVE PERCENT: 3.5 %
GFR AFRICAN AMERICAN: >60
GFR NON-AFRICAN AMERICAN: 50
GLOBULIN: 2.6 G/DL
GLUCOSE BLD-MCNC: 100 MG/DL (ref 70–99)
GRAM STAIN RESULT: ABNORMAL
HCT VFR BLD CALC: 37.9 % (ref 40.5–52.5)
HEMOGLOBIN: 12.5 G/DL (ref 13.5–17.5)
LYMPHOCYTES ABSOLUTE: 1.4 K/UL (ref 1–5.1)
LYMPHOCYTES RELATIVE PERCENT: 25.4 %
MCH RBC QN AUTO: 30.4 PG (ref 26–34)
MCHC RBC AUTO-ENTMCNC: 33.1 G/DL (ref 31–36)
MCV RBC AUTO: 92.1 FL (ref 80–100)
MONOCYTES ABSOLUTE: 0.6 K/UL (ref 0–1.3)
MONOCYTES RELATIVE PERCENT: 11 %
NEUTROPHILS ABSOLUTE: 3.2 K/UL (ref 1.7–7.7)
NEUTROPHILS RELATIVE PERCENT: 59.3 %
ORGANISM: ABNORMAL
ORGANISM: ABNORMAL
PDW BLD-RTO: 15.4 % (ref 12.4–15.4)
PLATELET # BLD: 180 K/UL (ref 135–450)
PMV BLD AUTO: 9.1 FL (ref 5–10.5)
POTASSIUM SERPL-SCNC: 4.6 MMOL/L (ref 3.5–5.1)
RBC # BLD: 4.12 M/UL (ref 4.2–5.9)
SEDIMENTATION RATE, ERYTHROCYTE: 16 MM/HR (ref 0–20)
SODIUM BLD-SCNC: 140 MMOL/L (ref 136–145)
TOTAL PROTEIN: 7.1 G/DL (ref 6.4–8.2)
WBC # BLD: 5.4 K/UL (ref 4–11)
WOUND/ABSCESS: ABNORMAL

## 2017-12-11 ENCOUNTER — TELEPHONE (OUTPATIENT)
Dept: INFECTIOUS DISEASES | Age: 67
End: 2017-12-11

## 2017-12-11 DIAGNOSIS — M86.472 CHRONIC OSTEOMYELITIS OF LEFT FOOT WITH DRAINING SINUS (HCC): Primary | ICD-10-CM

## 2017-12-11 DIAGNOSIS — L08.9 LEFT FOOT INFECTION: Chronic | ICD-10-CM

## 2017-12-11 LAB
A/G RATIO: 1.6 (ref 1.1–2.2)
ALBUMIN SERPL-MCNC: 4.2 G/DL (ref 3.4–5)
ALP BLD-CCNC: 63 U/L (ref 40–129)
ALT SERPL-CCNC: 40 U/L (ref 10–40)
ANION GAP SERPL CALCULATED.3IONS-SCNC: 16 MMOL/L (ref 3–16)
AST SERPL-CCNC: 31 U/L (ref 15–37)
BASOPHILS ABSOLUTE: 0 K/UL (ref 0–0.2)
BASOPHILS RELATIVE PERCENT: 0.8 %
BILIRUB SERPL-MCNC: 0.3 MG/DL (ref 0–1)
BUN BLDV-MCNC: 22 MG/DL (ref 7–20)
C-REACTIVE PROTEIN: 24.5 MG/L (ref 0–5.1)
CALCIUM SERPL-MCNC: 9 MG/DL (ref 8.3–10.6)
CHLORIDE BLD-SCNC: 102 MMOL/L (ref 99–110)
CO2: 23 MMOL/L (ref 21–32)
CREAT SERPL-MCNC: 1.3 MG/DL (ref 0.8–1.3)
EOSINOPHILS ABSOLUTE: 0.2 K/UL (ref 0–0.6)
EOSINOPHILS RELATIVE PERCENT: 3.2 %
GFR AFRICAN AMERICAN: >60
GFR NON-AFRICAN AMERICAN: 55
GLOBULIN: 2.7 G/DL
GLUCOSE BLD-MCNC: 106 MG/DL (ref 70–99)
HCT VFR BLD CALC: 37.9 % (ref 40.5–52.5)
HEMOGLOBIN: 12.7 G/DL (ref 13.5–17.5)
LYMPHOCYTES ABSOLUTE: 1.4 K/UL (ref 1–5.1)
LYMPHOCYTES RELATIVE PERCENT: 24.6 %
MCH RBC QN AUTO: 30.5 PG (ref 26–34)
MCHC RBC AUTO-ENTMCNC: 33.6 G/DL (ref 31–36)
MCV RBC AUTO: 90.8 FL (ref 80–100)
MONOCYTES ABSOLUTE: 0.5 K/UL (ref 0–1.3)
MONOCYTES RELATIVE PERCENT: 9.6 %
NEUTROPHILS ABSOLUTE: 3.5 K/UL (ref 1.7–7.7)
NEUTROPHILS RELATIVE PERCENT: 61.8 %
PDW BLD-RTO: 15 % (ref 12.4–15.4)
PLATELET # BLD: 178 K/UL (ref 135–450)
PMV BLD AUTO: 8.9 FL (ref 5–10.5)
POTASSIUM SERPL-SCNC: 4.8 MMOL/L (ref 3.5–5.1)
RBC # BLD: 4.18 M/UL (ref 4.2–5.9)
SEDIMENTATION RATE, ERYTHROCYTE: 20 MM/HR (ref 0–20)
SODIUM BLD-SCNC: 141 MMOL/L (ref 136–145)
TOTAL PROTEIN: 6.9 G/DL (ref 6.4–8.2)
WBC # BLD: 5.7 K/UL (ref 4–11)

## 2017-12-11 RX ORDER — SULFAMETHOXAZOLE AND TRIMETHOPRIM 800; 160 MG/1; MG/1
1 TABLET ORAL DAILY
Qty: 30 TABLET | Refills: 3 | Status: SHIPPED | OUTPATIENT
Start: 2017-12-11 | End: 2017-12-13 | Stop reason: SDUPTHER

## 2017-12-13 ENCOUNTER — TELEPHONE (OUTPATIENT)
Dept: INFECTIOUS DISEASES | Age: 67
End: 2017-12-13

## 2017-12-13 RX ORDER — SULFAMETHOXAZOLE AND TRIMETHOPRIM 800; 160 MG/1; MG/1
1 TABLET ORAL DAILY
Qty: 30 TABLET | Refills: 3 | Status: CANCELLED | OUTPATIENT
Start: 2017-12-13 | End: 2018-01-12

## 2017-12-13 RX ORDER — SULFAMETHOXAZOLE AND TRIMETHOPRIM 800; 160 MG/1; MG/1
1 TABLET ORAL DAILY
Qty: 30 TABLET | Refills: 3 | Status: SHIPPED | OUTPATIENT
Start: 2017-12-13 | End: 2017-12-14 | Stop reason: CLARIF

## 2017-12-13 NOTE — TELEPHONE ENCOUNTER
Called pt -  Foot wound improved (smaller). Last labs - ESR 20, CRP 24.5, Cr 1.3, WBC 5.7    On bactrim.     PICC ok to remove

## 2017-12-14 ENCOUNTER — OFFICE VISIT (OUTPATIENT)
Dept: INTERNAL MEDICINE | Age: 67
End: 2017-12-14

## 2017-12-14 ENCOUNTER — TELEPHONE (OUTPATIENT)
Dept: INFECTIOUS DISEASES | Age: 67
End: 2017-12-14

## 2017-12-14 VITALS — DIASTOLIC BLOOD PRESSURE: 82 MMHG | SYSTOLIC BLOOD PRESSURE: 120 MMHG

## 2017-12-14 DIAGNOSIS — M86.472 CHRONIC OSTEOMYELITIS OF LEFT FOOT WITH DRAINING SINUS (HCC): ICD-10-CM

## 2017-12-14 DIAGNOSIS — R60.9 EDEMA, UNSPECIFIED TYPE: Primary | ICD-10-CM

## 2017-12-14 PROBLEM — N17.9 AKI (ACUTE KIDNEY INJURY) (HCC): Status: RESOLVED | Noted: 2017-09-28 | Resolved: 2017-12-14

## 2017-12-14 PROCEDURE — G8427 DOCREV CUR MEDS BY ELIG CLIN: HCPCS | Performed by: INTERNAL MEDICINE

## 2017-12-14 PROCEDURE — G8417 CALC BMI ABV UP PARAM F/U: HCPCS | Performed by: INTERNAL MEDICINE

## 2017-12-14 PROCEDURE — 4040F PNEUMOC VAC/ADMIN/RCVD: CPT | Performed by: INTERNAL MEDICINE

## 2017-12-14 PROCEDURE — 3017F COLORECTAL CA SCREEN DOC REV: CPT | Performed by: INTERNAL MEDICINE

## 2017-12-14 PROCEDURE — 1123F ACP DISCUSS/DSCN MKR DOCD: CPT | Performed by: INTERNAL MEDICINE

## 2017-12-14 PROCEDURE — 3288F FALL RISK ASSESSMENT DOCD: CPT | Performed by: INTERNAL MEDICINE

## 2017-12-14 PROCEDURE — 99213 OFFICE O/P EST LOW 20 MIN: CPT | Performed by: INTERNAL MEDICINE

## 2017-12-14 PROCEDURE — 1036F TOBACCO NON-USER: CPT | Performed by: INTERNAL MEDICINE

## 2017-12-14 PROCEDURE — G8484 FLU IMMUNIZE NO ADMIN: HCPCS | Performed by: INTERNAL MEDICINE

## 2017-12-14 PROCEDURE — G8510 SCR DEP NEG, NO PLAN REQD: HCPCS | Performed by: INTERNAL MEDICINE

## 2017-12-14 RX ORDER — FUROSEMIDE 20 MG/1
20 TABLET ORAL DAILY
Qty: 90 TABLET | Refills: 3 | Status: SHIPPED | OUTPATIENT
Start: 2017-12-14 | End: 2018-10-16 | Stop reason: SDUPTHER

## 2017-12-14 RX ORDER — FUROSEMIDE 20 MG/1
20 TABLET ORAL DAILY
Qty: 30 TABLET | Refills: 3 | Status: SHIPPED | OUTPATIENT
Start: 2017-12-14 | End: 2017-12-14 | Stop reason: SDUPTHER

## 2017-12-14 RX ORDER — SULFAMETHOXAZOLE AND TRIMETHOPRIM 800; 160 MG/1; MG/1
1 TABLET ORAL DAILY
Qty: 30 TABLET | Refills: 3 | Status: CANCELLED | OUTPATIENT
Start: 2017-12-14 | End: 2018-01-13

## 2017-12-14 RX ORDER — SULFAMETHOXAZOLE AND TRIMETHOPRIM 800; 160 MG/1; MG/1
1 TABLET ORAL DAILY
Qty: 30 TABLET | Refills: 3 | Status: SHIPPED | OUTPATIENT
Start: 2017-12-14 | End: 2018-02-14 | Stop reason: SDUPTHER

## 2017-12-14 ASSESSMENT — PATIENT HEALTH QUESTIONNAIRE - PHQ9
SUM OF ALL RESPONSES TO PHQ9 QUESTIONS 1 & 2: 0
SUM OF ALL RESPONSES TO PHQ QUESTIONS 1-9: 0
1. LITTLE INTEREST OR PLEASURE IN DOING THINGS: 0

## 2017-12-14 NOTE — PROGRESS NOTES
HPI: Patient presented with follow-up of his edema and Rolo mellitus of his left foot he now has MRSA as well as pseudomonas which is multi resistant and has continual swelling he ran out of his Lasix his acute kidney injury has pre-much stabilized and now his creatinine is about 1.3-1.4    Review of Systems: Otherwise negative    Vital Signs: /82   General: Patient appears  non-toxic  HENT: Atraumatic, normocephalic, oral mucosa moist  Lungs:  Clear bilaterally  Heart: Regular rate and rhythm  Abdomen: Non-distended, soft, non-tender  Extremities: 2+ edema left lower extremity  Neuro: Nonfocal    Medical Decision Making and Plan:  Pertinent Labs & Imaging studies reviewed. (See chart for details)      1. Edema, unspecified type  Probably result of dependent edema from infection will [place] area and continue Lasix 20 q. day    2.  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

## 2018-01-02 ENCOUNTER — TELEPHONE (OUTPATIENT)
Dept: INFECTIOUS DISEASES | Age: 68
End: 2018-01-02

## 2018-01-02 DIAGNOSIS — L08.9 DIABETIC FOOT INFECTION (HCC): ICD-10-CM

## 2018-01-02 DIAGNOSIS — L03.119 CELLULITIS OF FOOT: ICD-10-CM

## 2018-01-02 DIAGNOSIS — E11.628 DIABETIC FOOT INFECTION (HCC): ICD-10-CM

## 2018-01-02 DIAGNOSIS — L03.119 CELLULITIS OF FOOT: Primary | ICD-10-CM

## 2018-01-02 LAB
A/G RATIO: 1.7 (ref 1.1–2.2)
ALBUMIN SERPL-MCNC: 4.8 G/DL (ref 3.4–5)
ALP BLD-CCNC: 61 U/L (ref 40–129)
ALT SERPL-CCNC: 36 U/L (ref 10–40)
ANION GAP SERPL CALCULATED.3IONS-SCNC: 15 MMOL/L (ref 3–16)
AST SERPL-CCNC: 28 U/L (ref 15–37)
BASOPHILS ABSOLUTE: 0 K/UL (ref 0–0.2)
BASOPHILS RELATIVE PERCENT: 0.5 %
BILIRUB SERPL-MCNC: 0.3 MG/DL (ref 0–1)
BUN BLDV-MCNC: 26 MG/DL (ref 7–20)
C-REACTIVE PROTEIN: 17.4 MG/L (ref 0–5.1)
CALCIUM SERPL-MCNC: 9.5 MG/DL (ref 8.3–10.6)
CHLORIDE BLD-SCNC: 100 MMOL/L (ref 99–110)
CO2: 23 MMOL/L (ref 21–32)
CREAT SERPL-MCNC: 1.4 MG/DL (ref 0.8–1.3)
EOSINOPHILS ABSOLUTE: 0.3 K/UL (ref 0–0.6)
EOSINOPHILS RELATIVE PERCENT: 4.6 %
GFR AFRICAN AMERICAN: >60
GFR NON-AFRICAN AMERICAN: 50
GLOBULIN: 2.9 G/DL
GLUCOSE BLD-MCNC: 94 MG/DL (ref 70–99)
HCT VFR BLD CALC: 41.3 % (ref 40.5–52.5)
HEMOGLOBIN: 13.6 G/DL (ref 13.5–17.5)
LYMPHOCYTES ABSOLUTE: 1.9 K/UL (ref 1–5.1)
LYMPHOCYTES RELATIVE PERCENT: 31.4 %
MCH RBC QN AUTO: 30 PG (ref 26–34)
MCHC RBC AUTO-ENTMCNC: 33 G/DL (ref 31–36)
MCV RBC AUTO: 91 FL (ref 80–100)
MONOCYTES ABSOLUTE: 0.6 K/UL (ref 0–1.3)
MONOCYTES RELATIVE PERCENT: 9.5 %
NEUTROPHILS ABSOLUTE: 3.3 K/UL (ref 1.7–7.7)
NEUTROPHILS RELATIVE PERCENT: 54 %
PDW BLD-RTO: 14.6 % (ref 12.4–15.4)
PLATELET # BLD: 179 K/UL (ref 135–450)
PMV BLD AUTO: 9 FL (ref 5–10.5)
POTASSIUM SERPL-SCNC: 4.7 MMOL/L (ref 3.5–5.1)
RBC # BLD: 4.54 M/UL (ref 4.2–5.9)
SEDIMENTATION RATE, ERYTHROCYTE: 20 MM/HR (ref 0–20)
SODIUM BLD-SCNC: 138 MMOL/L (ref 136–145)
TOTAL PROTEIN: 7.7 G/DL (ref 6.4–8.2)
WBC # BLD: 6.1 K/UL (ref 4–11)

## 2018-01-22 DIAGNOSIS — M86.472 CHRONIC OSTEOMYELITIS OF LEFT FOOT WITH DRAINING SINUS (HCC): ICD-10-CM

## 2018-01-22 DIAGNOSIS — M86.472 CHRONIC OSTEOMYELITIS OF LEFT FOOT WITH DRAINING SINUS (HCC): Primary | ICD-10-CM

## 2018-01-22 LAB
ANION GAP SERPL CALCULATED.3IONS-SCNC: 15 MMOL/L (ref 3–16)
BASOPHILS ABSOLUTE: 0.1 K/UL (ref 0–0.2)
BASOPHILS RELATIVE PERCENT: 1 %
BUN BLDV-MCNC: 24 MG/DL (ref 7–20)
C-REACTIVE PROTEIN: 5.3 MG/L (ref 0–5.1)
CALCIUM SERPL-MCNC: 9.5 MG/DL (ref 8.3–10.6)
CHLORIDE BLD-SCNC: 102 MMOL/L (ref 99–110)
CO2: 22 MMOL/L (ref 21–32)
CREAT SERPL-MCNC: 1.5 MG/DL (ref 0.8–1.3)
EOSINOPHILS ABSOLUTE: 0.2 K/UL (ref 0–0.6)
EOSINOPHILS RELATIVE PERCENT: 2.8 %
GFR AFRICAN AMERICAN: 56
GFR NON-AFRICAN AMERICAN: 46
GLUCOSE BLD-MCNC: 97 MG/DL (ref 70–99)
HCT VFR BLD CALC: 41.8 % (ref 40.5–52.5)
HEMOGLOBIN: 14.1 G/DL (ref 13.5–17.5)
LYMPHOCYTES ABSOLUTE: 1.8 K/UL (ref 1–5.1)
LYMPHOCYTES RELATIVE PERCENT: 27.2 %
MCH RBC QN AUTO: 30.4 PG (ref 26–34)
MCHC RBC AUTO-ENTMCNC: 33.8 G/DL (ref 31–36)
MCV RBC AUTO: 90 FL (ref 80–100)
MONOCYTES ABSOLUTE: 0.7 K/UL (ref 0–1.3)
MONOCYTES RELATIVE PERCENT: 9.8 %
NEUTROPHILS ABSOLUTE: 4 K/UL (ref 1.7–7.7)
NEUTROPHILS RELATIVE PERCENT: 59.2 %
PDW BLD-RTO: 14.2 % (ref 12.4–15.4)
PLATELET # BLD: 180 K/UL (ref 135–450)
PMV BLD AUTO: 9.2 FL (ref 5–10.5)
POTASSIUM SERPL-SCNC: 4.6 MMOL/L (ref 3.5–5.1)
RBC # BLD: 4.65 M/UL (ref 4.2–5.9)
SEDIMENTATION RATE, ERYTHROCYTE: 9 MM/HR (ref 0–20)
SODIUM BLD-SCNC: 139 MMOL/L (ref 136–145)
WBC # BLD: 6.8 K/UL (ref 4–11)

## 2018-02-12 DIAGNOSIS — M86.472 CHRONIC OSTEOMYELITIS OF LEFT FOOT WITH DRAINING SINUS (HCC): ICD-10-CM

## 2018-02-12 LAB
ANION GAP SERPL CALCULATED.3IONS-SCNC: 18 MMOL/L (ref 3–16)
BASOPHILS ABSOLUTE: 0 K/UL (ref 0–0.2)
BASOPHILS RELATIVE PERCENT: 0.4 %
BUN BLDV-MCNC: 24 MG/DL (ref 7–20)
C-REACTIVE PROTEIN: 6.6 MG/L (ref 0–5.1)
CALCIUM SERPL-MCNC: 10 MG/DL (ref 8.3–10.6)
CHLORIDE BLD-SCNC: 100 MMOL/L (ref 99–110)
CO2: 24 MMOL/L (ref 21–32)
CREAT SERPL-MCNC: 1.5 MG/DL (ref 0.8–1.3)
EOSINOPHILS ABSOLUTE: 0.1 K/UL (ref 0–0.6)
EOSINOPHILS RELATIVE PERCENT: 1.5 %
GFR AFRICAN AMERICAN: 56
GFR NON-AFRICAN AMERICAN: 46
GLUCOSE BLD-MCNC: 96 MG/DL (ref 70–99)
HCT VFR BLD CALC: 46.2 % (ref 40.5–52.5)
HEMOGLOBIN: 15.8 G/DL (ref 13.5–17.5)
LYMPHOCYTES ABSOLUTE: 1.5 K/UL (ref 1–5.1)
LYMPHOCYTES RELATIVE PERCENT: 16.6 %
MCH RBC QN AUTO: 31 PG (ref 26–34)
MCHC RBC AUTO-ENTMCNC: 34.1 G/DL (ref 31–36)
MCV RBC AUTO: 90.9 FL (ref 80–100)
MONOCYTES ABSOLUTE: 0.6 K/UL (ref 0–1.3)
MONOCYTES RELATIVE PERCENT: 6.6 %
NEUTROPHILS ABSOLUTE: 6.8 K/UL (ref 1.7–7.7)
NEUTROPHILS RELATIVE PERCENT: 74.9 %
PDW BLD-RTO: 14.8 % (ref 12.4–15.4)
PLATELET # BLD: 192 K/UL (ref 135–450)
PMV BLD AUTO: 9.3 FL (ref 5–10.5)
POTASSIUM SERPL-SCNC: 4.1 MMOL/L (ref 3.5–5.1)
RBC # BLD: 5.09 M/UL (ref 4.2–5.9)
SEDIMENTATION RATE, ERYTHROCYTE: 12 MM/HR (ref 0–20)
SODIUM BLD-SCNC: 142 MMOL/L (ref 136–145)
WBC # BLD: 9 K/UL (ref 4–11)

## 2018-03-06 DIAGNOSIS — M86.472 CHRONIC OSTEOMYELITIS OF LEFT FOOT WITH DRAINING SINUS (HCC): ICD-10-CM

## 2018-03-06 LAB
ANION GAP SERPL CALCULATED.3IONS-SCNC: 16 MMOL/L (ref 3–16)
BASOPHILS ABSOLUTE: 0 K/UL (ref 0–0.2)
BASOPHILS RELATIVE PERCENT: 0.6 %
BUN BLDV-MCNC: 22 MG/DL (ref 7–20)
C-REACTIVE PROTEIN: 20.5 MG/L (ref 0–5.1)
CALCIUM SERPL-MCNC: 9 MG/DL (ref 8.3–10.6)
CHLORIDE BLD-SCNC: 102 MMOL/L (ref 99–110)
CO2: 24 MMOL/L (ref 21–32)
CREAT SERPL-MCNC: 1.3 MG/DL (ref 0.8–1.3)
EOSINOPHILS ABSOLUTE: 0.2 K/UL (ref 0–0.6)
EOSINOPHILS RELATIVE PERCENT: 2.9 %
GFR AFRICAN AMERICAN: >60
GFR NON-AFRICAN AMERICAN: 55
GLUCOSE BLD-MCNC: 116 MG/DL (ref 70–99)
HCT VFR BLD CALC: 42 % (ref 40.5–52.5)
HEMOGLOBIN: 14.1 G/DL (ref 13.5–17.5)
LYMPHOCYTES ABSOLUTE: 1.7 K/UL (ref 1–5.1)
LYMPHOCYTES RELATIVE PERCENT: 27.3 %
MCH RBC QN AUTO: 30.3 PG (ref 26–34)
MCHC RBC AUTO-ENTMCNC: 33.6 G/DL (ref 31–36)
MCV RBC AUTO: 90.2 FL (ref 80–100)
MONOCYTES ABSOLUTE: 0.6 K/UL (ref 0–1.3)
MONOCYTES RELATIVE PERCENT: 9.1 %
NEUTROPHILS ABSOLUTE: 3.8 K/UL (ref 1.7–7.7)
NEUTROPHILS RELATIVE PERCENT: 60.1 %
PDW BLD-RTO: 15.7 % (ref 12.4–15.4)
PLATELET # BLD: 166 K/UL (ref 135–450)
PMV BLD AUTO: 9.2 FL (ref 5–10.5)
POTASSIUM SERPL-SCNC: 4.9 MMOL/L (ref 3.5–5.1)
RBC # BLD: 4.66 M/UL (ref 4.2–5.9)
SEDIMENTATION RATE, ERYTHROCYTE: 10 MM/HR (ref 0–20)
SODIUM BLD-SCNC: 142 MMOL/L (ref 136–145)
WBC # BLD: 6.3 K/UL (ref 4–11)

## 2018-03-22 RX ORDER — CIPROFLOXACIN 500 MG/1
500 TABLET, FILM COATED ORAL 2 TIMES DAILY
Qty: 180 TABLET | Refills: 3 | Status: SHIPPED | OUTPATIENT
Start: 2018-03-22 | End: 2018-06-20

## 2018-04-06 DIAGNOSIS — M86.472 CHRONIC OSTEOMYELITIS OF LEFT FOOT WITH DRAINING SINUS (HCC): ICD-10-CM

## 2018-04-06 LAB
ANION GAP SERPL CALCULATED.3IONS-SCNC: 18 MMOL/L (ref 3–16)
BASOPHILS ABSOLUTE: 0.1 K/UL (ref 0–0.2)
BASOPHILS RELATIVE PERCENT: 0.7 %
BUN BLDV-MCNC: 30 MG/DL (ref 7–20)
C-REACTIVE PROTEIN: 12 MG/L (ref 0–5.1)
CALCIUM SERPL-MCNC: 9.1 MG/DL (ref 8.3–10.6)
CHLORIDE BLD-SCNC: 100 MMOL/L (ref 99–110)
CO2: 22 MMOL/L (ref 21–32)
CREAT SERPL-MCNC: 1.4 MG/DL (ref 0.8–1.3)
EOSINOPHILS ABSOLUTE: 0.2 K/UL (ref 0–0.6)
EOSINOPHILS RELATIVE PERCENT: 2.1 %
GFR AFRICAN AMERICAN: >60
GFR NON-AFRICAN AMERICAN: 50
GLUCOSE BLD-MCNC: 105 MG/DL (ref 70–99)
HCT VFR BLD CALC: 42 % (ref 40.5–52.5)
HEMOGLOBIN: 14.3 G/DL (ref 13.5–17.5)
LYMPHOCYTES ABSOLUTE: 1.4 K/UL (ref 1–5.1)
LYMPHOCYTES RELATIVE PERCENT: 18.7 %
MCH RBC QN AUTO: 30.3 PG (ref 26–34)
MCHC RBC AUTO-ENTMCNC: 34.1 G/DL (ref 31–36)
MCV RBC AUTO: 88.9 FL (ref 80–100)
MONOCYTES ABSOLUTE: 0.7 K/UL (ref 0–1.3)
MONOCYTES RELATIVE PERCENT: 9.7 %
NEUTROPHILS ABSOLUTE: 5.1 K/UL (ref 1.7–7.7)
NEUTROPHILS RELATIVE PERCENT: 68.8 %
PDW BLD-RTO: 15.5 % (ref 12.4–15.4)
PLATELET # BLD: 172 K/UL (ref 135–450)
PMV BLD AUTO: 9.1 FL (ref 5–10.5)
POTASSIUM SERPL-SCNC: 4.7 MMOL/L (ref 3.5–5.1)
RBC # BLD: 4.73 M/UL (ref 4.2–5.9)
SODIUM BLD-SCNC: 140 MMOL/L (ref 136–145)
WBC # BLD: 7.5 K/UL (ref 4–11)

## 2018-04-07 LAB — SEDIMENTATION RATE, ERYTHROCYTE: 10 MM/HR (ref 0–20)

## 2018-04-24 ENCOUNTER — TELEPHONE (OUTPATIENT)
Dept: INFECTIOUS DISEASES | Age: 68
End: 2018-04-24

## 2018-05-23 DIAGNOSIS — M86.472 CHRONIC OSTEOMYELITIS OF LEFT FOOT WITH DRAINING SINUS (HCC): ICD-10-CM

## 2018-05-23 LAB
ANION GAP SERPL CALCULATED.3IONS-SCNC: 16 MMOL/L (ref 3–16)
BASOPHILS ABSOLUTE: 0 K/UL (ref 0–0.2)
BASOPHILS RELATIVE PERCENT: 0.6 %
BUN BLDV-MCNC: 25 MG/DL (ref 7–20)
C-REACTIVE PROTEIN: 8.2 MG/L (ref 0–5.1)
CALCIUM SERPL-MCNC: 9.2 MG/DL (ref 8.3–10.6)
CHLORIDE BLD-SCNC: 99 MMOL/L (ref 99–110)
CO2: 24 MMOL/L (ref 21–32)
CREAT SERPL-MCNC: 1.2 MG/DL (ref 0.8–1.3)
EOSINOPHILS ABSOLUTE: 0.1 K/UL (ref 0–0.6)
EOSINOPHILS RELATIVE PERCENT: 2.2 %
GFR AFRICAN AMERICAN: >60
GFR NON-AFRICAN AMERICAN: >60
GLUCOSE BLD-MCNC: 102 MG/DL (ref 70–99)
HCT VFR BLD CALC: 42.8 % (ref 40.5–52.5)
HEMOGLOBIN: 14.5 G/DL (ref 13.5–17.5)
LYMPHOCYTES ABSOLUTE: 1.2 K/UL (ref 1–5.1)
LYMPHOCYTES RELATIVE PERCENT: 19.4 %
MCH RBC QN AUTO: 30.4 PG (ref 26–34)
MCHC RBC AUTO-ENTMCNC: 34 G/DL (ref 31–36)
MCV RBC AUTO: 89.5 FL (ref 80–100)
MONOCYTES ABSOLUTE: 0.5 K/UL (ref 0–1.3)
MONOCYTES RELATIVE PERCENT: 9.2 %
NEUTROPHILS ABSOLUTE: 4.1 K/UL (ref 1.7–7.7)
NEUTROPHILS RELATIVE PERCENT: 68.6 %
PDW BLD-RTO: 14.6 % (ref 12.4–15.4)
PLATELET # BLD: 135 K/UL (ref 135–450)
PMV BLD AUTO: 9.2 FL (ref 5–10.5)
POTASSIUM SERPL-SCNC: 4.5 MMOL/L (ref 3.5–5.1)
RBC # BLD: 4.78 M/UL (ref 4.2–5.9)
SEDIMENTATION RATE, ERYTHROCYTE: 8 MM/HR (ref 0–20)
SODIUM BLD-SCNC: 139 MMOL/L (ref 136–145)
WBC # BLD: 6 K/UL (ref 4–11)

## 2018-09-06 DIAGNOSIS — M86.472 CHRONIC OSTEOMYELITIS OF LEFT FOOT WITH DRAINING SINUS (HCC): ICD-10-CM

## 2018-09-06 LAB
ANION GAP SERPL CALCULATED.3IONS-SCNC: 12 MMOL/L (ref 3–16)
BASOPHILS ABSOLUTE: 0 K/UL (ref 0–0.2)
BASOPHILS RELATIVE PERCENT: 0.8 %
BUN BLDV-MCNC: 25 MG/DL (ref 7–20)
C-REACTIVE PROTEIN: 4.8 MG/L (ref 0–5.1)
CALCIUM SERPL-MCNC: 9.4 MG/DL (ref 8.3–10.6)
CHLORIDE BLD-SCNC: 103 MMOL/L (ref 99–110)
CO2: 24 MMOL/L (ref 21–32)
CREAT SERPL-MCNC: 1.2 MG/DL (ref 0.8–1.3)
EOSINOPHILS ABSOLUTE: 0.2 K/UL (ref 0–0.6)
EOSINOPHILS RELATIVE PERCENT: 2.7 %
GFR AFRICAN AMERICAN: >60
GFR NON-AFRICAN AMERICAN: >60
GLUCOSE BLD-MCNC: 110 MG/DL (ref 70–99)
HCT VFR BLD CALC: 46.7 % (ref 40.5–52.5)
HEMOGLOBIN: 15.8 G/DL (ref 13.5–17.5)
LYMPHOCYTES ABSOLUTE: 1.7 K/UL (ref 1–5.1)
LYMPHOCYTES RELATIVE PERCENT: 30.6 %
MCH RBC QN AUTO: 31.1 PG (ref 26–34)
MCHC RBC AUTO-ENTMCNC: 33.7 G/DL (ref 31–36)
MCV RBC AUTO: 92.2 FL (ref 80–100)
MONOCYTES ABSOLUTE: 0.5 K/UL (ref 0–1.3)
MONOCYTES RELATIVE PERCENT: 9.2 %
NEUTROPHILS ABSOLUTE: 3.2 K/UL (ref 1.7–7.7)
NEUTROPHILS RELATIVE PERCENT: 56.7 %
PDW BLD-RTO: 15.9 % (ref 12.4–15.4)
PLATELET # BLD: 138 K/UL (ref 135–450)
PMV BLD AUTO: 9.2 FL (ref 5–10.5)
POTASSIUM SERPL-SCNC: 5.2 MMOL/L (ref 3.5–5.1)
RBC # BLD: 5.07 M/UL (ref 4.2–5.9)
SEDIMENTATION RATE, ERYTHROCYTE: 2 MM/HR (ref 0–20)
SODIUM BLD-SCNC: 139 MMOL/L (ref 136–145)
WBC # BLD: 5.7 K/UL (ref 4–11)

## 2018-10-16 RX ORDER — ATENOLOL 25 MG/1
TABLET ORAL
Qty: 30 TABLET | Refills: 0 | Status: SHIPPED | OUTPATIENT
Start: 2018-10-16 | End: 2018-12-13 | Stop reason: SDUPTHER

## 2018-10-16 RX ORDER — FUROSEMIDE 20 MG/1
TABLET ORAL
Qty: 30 TABLET | Refills: 0 | Status: SHIPPED | OUTPATIENT
Start: 2018-10-16 | End: 2020-05-13 | Stop reason: CLARIF

## 2018-12-13 ENCOUNTER — TELEPHONE (OUTPATIENT)
Dept: INTERNAL MEDICINE CLINIC | Age: 68
End: 2018-12-13

## 2018-12-13 RX ORDER — ATENOLOL 25 MG/1
TABLET ORAL
Qty: 30 TABLET | Refills: 0 | Status: SHIPPED | OUTPATIENT
Start: 2018-12-13 | End: 2019-01-02 | Stop reason: SDUPTHER

## 2019-01-02 RX ORDER — ATENOLOL 25 MG/1
TABLET ORAL
Qty: 90 TABLET | Refills: 0 | Status: SHIPPED | OUTPATIENT
Start: 2019-01-02 | End: 2019-04-10 | Stop reason: SDUPTHER

## 2019-04-09 ENCOUNTER — TELEPHONE (OUTPATIENT)
Dept: INTERNAL MEDICINE CLINIC | Age: 69
End: 2019-04-09

## 2019-04-09 NOTE — TELEPHONE ENCOUNTER
RE:(No subject)     From  Janiyacarlos eduardo Mistry To  [facility] [address]  4/9/2019  4:04 PM   ----- Message from [address] [place] [address] [address], Processor sent at 4/9/2019  4:04 PM EDT -----     Hi Yomaira. . hope all is well with you and yours. I have an urgent concern. My blood pressure medication has reached a critically low level that wont last me through the week. Oddly, my pharmacy refuses to refill the prescription, so please help me figure this out. I have been taking these for many years now and they have become mandatory in controlling my daily blood pressure readings. Thank you in advance for your speedy reply and resolve.      Appreciatively yours, David Jolly

## 2019-04-10 RX ORDER — ATENOLOL 25 MG/1
TABLET ORAL
Qty: 30 TABLET | Refills: 0 | Status: SHIPPED | OUTPATIENT
Start: 2019-04-10 | End: 2019-05-09 | Stop reason: SDUPTHER

## 2019-04-29 ENCOUNTER — PATIENT MESSAGE (OUTPATIENT)
Dept: INTERNAL MEDICINE CLINIC | Age: 69
End: 2019-04-29

## 2019-04-29 ENCOUNTER — TELEPHONE (OUTPATIENT)
Dept: INTERNAL MEDICINE CLINIC | Age: 69
End: 2019-04-29

## 2019-04-29 NOTE — TELEPHONE ENCOUNTER
From: Naina Juárez  To: Elizabeth Vigil MD  Sent: 4/29/2019 11:45 AM EDT  Subject: Visit Keith Carrizales, My next appointment is a ways off but I am currently experiencing constant and debilitating knee pain and I am in need of immediate relief asap. My older sister (69) has identical symptoms and receives successful relief through a process called 'Euflexxa Injections.' Please let me know if you are able to perform this for me in your office and if so if you are able to fit me in the next time you have a cancelation. Please advise asap.  Thank you. -Kaylyn Escoto

## 2019-05-01 ENCOUNTER — TELEPHONE (OUTPATIENT)
Dept: INTERNAL MEDICINE CLINIC | Age: 69
End: 2019-05-01

## 2019-05-01 DIAGNOSIS — M25.569 KNEE PAIN, UNSPECIFIED CHRONICITY, UNSPECIFIED LATERALITY: Primary | ICD-10-CM

## 2019-05-01 NOTE — TELEPHONE ENCOUNTER
I am in way too much pain to wait weeks for Dr. Adelaida Batista to see me so his office  fit me in for an emergency appointment tomorrow morning and they asked that you document your referral in [address] [place] [address] for the following:            Date: 5/2/2019                  Dept: Jordy Funes                  Provider: Taurus Mendoza PA-C                  Time: 8:30 AM  ______________________

## 2019-05-02 ENCOUNTER — OFFICE VISIT (OUTPATIENT)
Dept: ORTHOPEDIC SURGERY | Age: 69
End: 2019-05-02
Payer: MEDICARE

## 2019-05-02 ENCOUNTER — TELEPHONE (OUTPATIENT)
Dept: ORTHOPEDIC SURGERY | Age: 69
End: 2019-05-02

## 2019-05-02 VITALS
SYSTOLIC BLOOD PRESSURE: 146 MMHG | DIASTOLIC BLOOD PRESSURE: 84 MMHG | WEIGHT: 257.28 LBS | BODY MASS INDEX: 30.38 KG/M2 | HEIGHT: 77 IN | HEART RATE: 63 BPM

## 2019-05-02 DIAGNOSIS — M17.12 PRIMARY OSTEOARTHRITIS OF LEFT KNEE: ICD-10-CM

## 2019-05-02 DIAGNOSIS — G89.29 CHRONIC PAIN OF LEFT KNEE: Primary | ICD-10-CM

## 2019-05-02 DIAGNOSIS — M25.562 CHRONIC PAIN OF LEFT KNEE: Primary | ICD-10-CM

## 2019-05-02 PROCEDURE — 3017F COLORECTAL CA SCREEN DOC REV: CPT | Performed by: PHYSICIAN ASSISTANT

## 2019-05-02 PROCEDURE — 1036F TOBACCO NON-USER: CPT | Performed by: PHYSICIAN ASSISTANT

## 2019-05-02 PROCEDURE — G8417 CALC BMI ABV UP PARAM F/U: HCPCS | Performed by: PHYSICIAN ASSISTANT

## 2019-05-02 PROCEDURE — 1123F ACP DISCUSS/DSCN MKR DOCD: CPT | Performed by: PHYSICIAN ASSISTANT

## 2019-05-02 PROCEDURE — 4040F PNEUMOC VAC/ADMIN/RCVD: CPT | Performed by: PHYSICIAN ASSISTANT

## 2019-05-02 PROCEDURE — G8427 DOCREV CUR MEDS BY ELIG CLIN: HCPCS | Performed by: PHYSICIAN ASSISTANT

## 2019-05-02 PROCEDURE — 20610 DRAIN/INJ JOINT/BURSA W/O US: CPT | Performed by: PHYSICIAN ASSISTANT

## 2019-05-02 PROCEDURE — 99204 OFFICE O/P NEW MOD 45 MIN: CPT | Performed by: PHYSICIAN ASSISTANT

## 2019-05-02 NOTE — PROGRESS NOTES
NDC: 5976 0360 01  LOT: [id number]  EXP: 23/31/2020  SITE: Lt. Knee     We provided the Orthovisc medication. This is injection #1 of 3.

## 2019-05-02 NOTE — PROGRESS NOTES
Patient Name: Naina Juárez  : 1950  DOS: 2019        Chief Complaint:   Chief Complaint   Patient presents with    Knee Pain     Left knee       History of Present Illness:  Naina Juárez is a 70 y.o. male who presents with a chief complaint of continued complaints of pain in the knee with irritation with walking, stairs and with overuse. Pain in the knee regularly with swelling and discomfort. Increase in pain over the past several months time. Patient notes months increased pain within the left knee. States that it's aggravated with increased periods of standing and walking. Notes he has tried to use ice resting elevation as well as ibuprofen 800 mg as needed. Patient denies instability of the knee also denies buckling or give way. Denies use of brace or assistive walking device such as cane or walker. Patient notes his overall pain levels a 5 out of 10 at its worst and it 2 out of 10 on good days. Patient notes that she has had significant issues with bilateral feet requiring tarsal amputations in 17 and 18 that he is developed significant arthritis in bilateral knees and is in need of surgical intervention. Patient notes that his sister has used Euflexxa injections in the past and is a great success with him is wondering if that is a possibility of performing these at today's visit. Medical History  Current Medications:   Prior to Admission medications    Medication Sig Start Date End Date Taking?  Authorizing Provider   atenolol (TENORMIN) 25 MG tablet TAKE 1 TABLET EVERY DAY 4/10/19   Elizabeth Vigil MD   furosemide (LASIX) 20 MG tablet TAKE 1 TABLET EVERY DAY 10/16/18   Elizabeth Vigil MD   Saint Ouch Oil 1000 MG CAPS Take 1 tablet by mouth daily    Historical Provider, MD   b complex vitamins capsule Take 1 capsule by mouth daily    Historical Provider, MD   Multiple Vitamins-Minerals (SENTRY SENIOR) TABS Take 1 tablet by mouth daily    Historical Provider, MD   Omega 3-6-9 Mood, affect and judgment normal. ______          Assessment   Vital Signs:      Vitals:    05/02/19 0841   BP: (!) 149/78   Pulse: 63       left Knee shows evidence minimal for DJD with no obvious pseudolaxity, mild pain with weight bearing, antalgic gait and mild palpable osteophytes. Inspection: Mild anterior swelling. Swelling is present with  small effusion. The posterior aspect of the knee appears to be full with mild tenderness. There is no erythema, rash, or ecchymosis. Range of Motion:  Right 0-120 left 0-110    No Pain with varus or valgus testing    There is no noted deformity    Strength:  Hamstrings rated: 4/5. Quadriceps rated: 5/5    Palpation: There is minimal tenderness along the medial and lateral joint line. Special Tests: Patellar Compression test is negative. Valgus & Varus test is negative. Skin: There are no rashes, ulcerations or lesions. Gait: Gait pattern is antalgic  Skin shows no rashes/ecchymosis to the affected area, no hyperesthesias, no discoloration, no temperature or color discrepancies. NEUROLOGICALLY: There is no evidence for sensory or motor deficits in the extremity. Coordination appears full with no spacticity or rigidity. Reflexes appear to be symmetric. Distal circulation intact. No signs of RSD. Additional Comments: She has bilateral tarsal amputations. Procedure(s): Orthovisc injection performed at today's visit    Injection Procedure Note  Procedure Details     Verbal consent was obtained for the procedure. The left knee(s) was prepped with iodine and the skin was anesthetized. Using a 22 gauge needle the left knee(s) joint is injected with 2 mL of Orthovisc solution under the lateral aspect of the knee. The injection site was cleansed with topical isopropyl alcohol and a dressing was applied. Complications:  None; patient tolerated the procedure well.       Diagnostic Test Findings:   Xray   Have reviewed the xrays above from

## 2019-05-09 ENCOUNTER — PATIENT MESSAGE (OUTPATIENT)
Dept: INTERNAL MEDICINE CLINIC | Age: 69
End: 2019-05-09

## 2019-05-09 ENCOUNTER — NURSE ONLY (OUTPATIENT)
Dept: ORTHOPEDIC SURGERY | Age: 69
End: 2019-05-09
Payer: MEDICARE

## 2019-05-09 VITALS
HEART RATE: 79 BPM | DIASTOLIC BLOOD PRESSURE: 81 MMHG | BODY MASS INDEX: 30.38 KG/M2 | WEIGHT: 257.28 LBS | HEIGHT: 77 IN | SYSTOLIC BLOOD PRESSURE: 139 MMHG

## 2019-05-09 DIAGNOSIS — G89.29 CHRONIC PAIN OF LEFT KNEE: ICD-10-CM

## 2019-05-09 DIAGNOSIS — M25.562 CHRONIC PAIN OF LEFT KNEE: ICD-10-CM

## 2019-05-09 DIAGNOSIS — M17.12 PRIMARY OSTEOARTHRITIS OF LEFT KNEE: Primary | ICD-10-CM

## 2019-05-09 PROCEDURE — 99024 POSTOP FOLLOW-UP VISIT: CPT | Performed by: PHYSICIAN ASSISTANT

## 2019-05-09 PROCEDURE — 20610 DRAIN/INJ JOINT/BURSA W/O US: CPT | Performed by: PHYSICIAN ASSISTANT

## 2019-05-09 RX ORDER — ATENOLOL 25 MG/1
TABLET ORAL
Qty: 30 TABLET | Refills: 0 | Status: SHIPPED | OUTPATIENT
Start: 2019-05-09 | End: 2019-05-13 | Stop reason: SDUPTHER

## 2019-05-09 NOTE — TELEPHONE ENCOUNTER
RE: Medication refill     From  [facility] \"Dominick Aguayo\" To  [facility] 111 Practice Support Sent  5/9/2019  9:44 AM   ----- Message from [address] [place] [address] [address], Processor sent at 5/9/2019  9:44 AM EDT -----     Hi Yomaira, as instructed by you when you okay'd the last refill for my blood pressure meds, I made an appointment with Dr Radha Holly the very next day. The soonest I could get the appointment for was the last week of this month. However my blood pressure meds will run out before the appointment so please send a refill request to the [address] to hold me over until I'm able to see Dr Radha Holly. Thank you in advance for that.

## 2019-05-09 NOTE — PROGRESS NOTES
[address]: [id number]    LOT: [id number]    EXP: 8/21    SITE: Lt. Knee     We provided the Monovisc medication. Did Monovisc injection instead of Orthovisc due to inventory.

## 2019-05-13 RX ORDER — ATENOLOL 25 MG/1
TABLET ORAL
Qty: 30 TABLET | Refills: 1 | Status: SHIPPED | OUTPATIENT
Start: 2019-05-13 | End: 2019-05-23 | Stop reason: SDUPTHER

## 2019-05-14 RX ORDER — ATENOLOL 25 MG/1
TABLET ORAL
Qty: 30 TABLET | Refills: 0 | OUTPATIENT
Start: 2019-05-14

## 2019-05-20 ENCOUNTER — TELEPHONE (OUTPATIENT)
Dept: INTERNAL MEDICINE CLINIC | Age: 69
End: 2019-05-20

## 2019-05-20 NOTE — TELEPHONE ENCOUNTER
Non-Urgent Medical Question     From  Grant Chester \"Dominick Aguayo\" To  [facility] Practice Support Sent  5/20/2019  1:08 PM   ----- Message from [address] [place] [address] [address], Processor sent at 5/20/2019  1:08 PM EDT -----     Dr Juan Barnhart, prior to my visit later this week, please evaluate the need for an additional shingles shot, along with any other shots that may need to be updated. Thank you in advance for that.

## 2019-05-23 ENCOUNTER — OFFICE VISIT (OUTPATIENT)
Dept: INTERNAL MEDICINE CLINIC | Age: 69
End: 2019-05-23
Payer: MEDICARE

## 2019-05-23 VITALS
BODY MASS INDEX: 30.94 KG/M2 | WEIGHT: 262 LBS | HEIGHT: 77 IN | DIASTOLIC BLOOD PRESSURE: 70 MMHG | SYSTOLIC BLOOD PRESSURE: 136 MMHG

## 2019-05-23 DIAGNOSIS — G60.9 IDIOPATHIC PERIPHERAL NEUROPATHY: ICD-10-CM

## 2019-05-23 DIAGNOSIS — G89.29 CHRONIC PAIN OF LEFT KNEE: Primary | ICD-10-CM

## 2019-05-23 DIAGNOSIS — M25.562 CHRONIC PAIN OF LEFT KNEE: Primary | ICD-10-CM

## 2019-05-23 DIAGNOSIS — E66.9 OBESITY (BMI 30-39.9): ICD-10-CM

## 2019-05-23 DIAGNOSIS — I15.9 SECONDARY HYPERTENSION: ICD-10-CM

## 2019-05-23 PROCEDURE — 4040F PNEUMOC VAC/ADMIN/RCVD: CPT | Performed by: INTERNAL MEDICINE

## 2019-05-23 PROCEDURE — G8417 CALC BMI ABV UP PARAM F/U: HCPCS | Performed by: INTERNAL MEDICINE

## 2019-05-23 PROCEDURE — 1123F ACP DISCUSS/DSCN MKR DOCD: CPT | Performed by: INTERNAL MEDICINE

## 2019-05-23 PROCEDURE — 99213 OFFICE O/P EST LOW 20 MIN: CPT | Performed by: INTERNAL MEDICINE

## 2019-05-23 PROCEDURE — 3017F COLORECTAL CA SCREEN DOC REV: CPT | Performed by: INTERNAL MEDICINE

## 2019-05-23 PROCEDURE — 1036F TOBACCO NON-USER: CPT | Performed by: INTERNAL MEDICINE

## 2019-05-23 PROCEDURE — G8427 DOCREV CUR MEDS BY ELIG CLIN: HCPCS | Performed by: INTERNAL MEDICINE

## 2019-05-23 RX ORDER — ATENOLOL 25 MG/1
TABLET ORAL
Qty: 90 TABLET | Refills: 3 | Status: SHIPPED | OUTPATIENT
Start: 2019-05-23 | End: 2020-02-24 | Stop reason: SDUPTHER

## 2019-05-23 ASSESSMENT — PATIENT HEALTH QUESTIONNAIRE - PHQ9
SUM OF ALL RESPONSES TO PHQ9 QUESTIONS 1 & 2: 0
SUM OF ALL RESPONSES TO PHQ QUESTIONS 1-9: 0
SUM OF ALL RESPONSES TO PHQ QUESTIONS 1-9: 0
1. LITTLE INTEREST OR PLEASURE IN DOING THINGS: 0
2. FEELING DOWN, DEPRESSED OR HOPELESS: 0

## 2019-05-23 NOTE — PROGRESS NOTES
infection 09/11/2016    E.coli ESBL-8/2015    Hepatitis C virus 1/2/2015    Hx of hepatitis C 1/16/2015    Hypertension     MDRO (multiple drug resistant organisms) resistance 09/10/2016    Pseudomonas MDRO in foot 6/23/16    MDRO (multiple drug resistant organisms) resistance 9/22/17; 09/08/2017    Pseudomonas MDR in wound    Non-pressure chronic ulcer of other part of left foot limited to breakdown of skin ([address]     Non-pressure chronic ulcer of other part of right foot limited to breakdown of skin ([address]     Peripheral neuropathy 1/2/2015       Past Surgical History:        Procedure Laterality Date    FOOT SURGERY Bilateral 10/8/2015    left foot bone biopsy X 2, bilat wound debridement, bilat. amnio graft    FOOT SURGERY Right 09/13/2016    TRANSMETATARSAL AMPUTATION RIGHT FOOT;    FOOT SURGERY Left 09/25/2017    INCISION AND DRAINAGE WITH EXCISION OF ALL NECROTIC SOFT TISSUE AND BONE, LEFT FOOT WITH APPLICATION OF WOUND VAC    OTHER SURGICAL HISTORY  09/11/2016    PARTIAL FIRST RAY TOE AMPUTATION - RIGHT FOOT (VANCOMYCIN    OTHER SURGICAL HISTORY Left 09/08/2017     TRANSMETATARSAL AMPUTATION LEFT FOOT ;    OTHER SURGICAL HISTORY Left 09/27/2017    INCISION AND DRAINAGE OF NECROTIC TISSUE AND BONE WITH RE-    OTHER SURGICAL HISTORY Left 09/30/2017    RESECTION FIRST METATARSAL WITH PARTIAL RESECTION SECOND,    TONSILLECTOMY         Allergies:  Clindamycin/lincomycin    Current Medications:    Prior to Admission medications    Medication Sig Start Date End Date Taking?  Authorizing Provider   atenolol (TENORMIN) 25 MG tablet TAKE 1 TABLET EVERY DAY 5/13/19  Yes Nathalia Swan MD   Homeopathic Products [facility]) GEL Formula #8E Baclo 2%, Diclo 3%, DMSO 5%, Criselda 6%, Lido 2%, prilo 2% Pharm to comp 5/2/19  Yes DOUG Sparks   furosemide (LASIX) 20 MG tablet TAKE 1 TABLET EVERY DAY 10/16/18  Yes Nathalia Swan MD   Krill Oil 1000 MG CAPS Take 1 tablet by mouth daily   Yes Historical Provider, MD BMP:    Lab Results   Component Value Date     09/06/2018    K 5.2 (H) 09/06/2018     09/06/2018    CO2 24 09/06/2018       LFT's:   Lab Results   Component Value Date    ALT 36 01/02/2018    AST 28 01/02/2018    ALKPHOS 61 01/02/2018    BILITOT 0.3 01/02/2018       Lipids:   Lab Results   Component Value Date    CHOL 154 01/02/2015    HDL 51 01/02/2015    LDLCALC 83 01/02/2015    TRIG 99 01/02/2015       INR:   Lab Results   Component Value Date    INR 0.98 10/04/2017    INR 1.08 09/27/2017    INR 1.15 09/22/2017    PROTIME 11.1 10/04/2017    PROTIME 12.2 09/27/2017    PROTIME 13.0 09/22/2017       U/A:  Lab Results   Component Value Date    LABMICR YES 09/29/2017          Lab Results   Component Value Date    LABA1C 5.2 01/21/2017        Lab Results   Component Value Date    CREATININE 1.2 09/06/2018       -----------------------------------------------------------------       Assessment/Plan:    Pt is a 71 y/o M with PMHx essential HTN that presents for f/u BP. 1. Chronic pain of left knee  - Sees Orthopedic PA    2. Essential hypertension - Well-controlled today, 136/70  - cont Tenormin 25mg PO qd    3. Idiopathic peripheral neuropathy  - Stable, cont current medications    4.  Obesity (BMI 30-39.9) - Discussed with patient diet issues, will refer to dietician   - Internal Referral to Dietitian

## 2019-05-23 NOTE — LETTER
[facility] [address]  [address], [address] [place] [id number]  Phone: [phone]  Fax: [phone]    Seven Gaviria MD        May 23, 2019     Patient: Maurilio Ormond   YOB: 1950   Date of Visit: 5/23/2019       To Whom It May Concern: It is my medical opinion that Jose Dominguez requires a disability parking placard for the following reasons: unable to walk 200 feet without assistance/assistive device due to peripheral neuropathy, osteoarthritis    Duration of need: 5 years    If you have any questions or concerns, please don't hesitate to call.     Sincerely,        Seven Gaviria MD   [facility][id number]

## 2019-06-03 ENCOUNTER — PATIENT MESSAGE (OUTPATIENT)
Dept: ORTHOPEDIC SURGERY | Age: 69
End: 2019-06-03

## 2019-06-13 NOTE — TELEPHONE ENCOUNTER
Yemi Gaytan, this patient is not to be charged for his Monovisc. Is there a way to be sure that he is not charged?

## 2019-06-13 NOTE — TELEPHONE ENCOUNTER
I contacted billing on 5/9 to remove the Orthovisc charge from 5/2 and keep the [place] [id number] charge. The Monovisc charge would be comparable to a full series of 3 Orthovisc charges. Did the patient get billed for both the Orthovisc and the Monovisc?

## 2019-06-17 NOTE — TELEPHONE ENCOUNTER
Per billing, as of 6/14, the Orthovisc charge was removed. If that was already paid it should be credited to the acct. The Monovisc charge will remain.

## 2019-06-20 ENCOUNTER — TELEPHONE (OUTPATIENT)
Dept: ORTHOPEDIC SURGERY | Age: 69
End: 2019-06-20

## 2019-06-20 NOTE — TELEPHONE ENCOUNTER
Spoke with patient. Gave him the info about the charges. He stated that the paperwork he has shows something different. He asked for someone to give him a call back about this. I did forward the message Rosario Elder from his [employer].

## 2019-06-20 NOTE — TELEPHONE ENCOUNTER
Spoke with patient at length (15mins) regarding his orthovisc and monovisc bills. I told him I would contact billing and see what we could do.

## 2019-10-15 ENCOUNTER — TELEPHONE (OUTPATIENT)
Dept: INTERNAL MEDICINE CLINIC | Age: 69
End: 2019-10-15

## 2019-10-21 ENCOUNTER — TELEPHONE (OUTPATIENT)
Dept: INTERNAL MEDICINE CLINIC | Age: 69
End: 2019-10-21

## 2019-11-27 ENCOUNTER — NURSE ONLY (OUTPATIENT)
Dept: INTERNAL MEDICINE CLINIC | Age: 69
End: 2019-11-27
Payer: MEDICARE

## 2019-11-27 DIAGNOSIS — Z23 NEED FOR INFLUENZA VACCINATION: Primary | ICD-10-CM

## 2019-11-27 PROCEDURE — 90653 IIV ADJUVANT VACCINE IM: CPT | Performed by: INTERNAL MEDICINE

## 2019-11-27 PROCEDURE — G0008 ADMIN INFLUENZA VIRUS VAC: HCPCS | Performed by: INTERNAL MEDICINE

## 2019-12-06 RX ORDER — SILDENAFIL 100 MG/1
TABLET, FILM COATED ORAL
Qty: 6 TABLET | Refills: 3 | Status: SHIPPED | OUTPATIENT
Start: 2019-12-06 | End: 2021-08-26

## 2020-02-24 RX ORDER — ATENOLOL 25 MG/1
TABLET ORAL
Qty: 30 TABLET | Refills: 2 | Status: SHIPPED | OUTPATIENT
Start: 2020-02-24 | End: 2020-03-23 | Stop reason: SDUPTHER

## 2020-03-06 ENCOUNTER — TELEPHONE (OUTPATIENT)
Dept: INTERNAL MEDICINE CLINIC | Age: 70
End: 2020-03-06

## 2020-03-06 NOTE — TELEPHONE ENCOUNTER
RE: Medication refill     From  Lazara Cartagena \"Dominick Aguayo\" To  [facility] 111 Practice Support Sent  3/6/2020  8:58 AM   First of all, I just want to assure you that this is not written reacting to the virus panic and I say that because I have been experiencing reoccurring cold/flu symtoms dating back to last [place]. Everytime I would recover after a week or so, a new cycle would reoccur. Which I have never experienced before. The most recent is more severe and feels like flu symptoms. AGGRESSIVE COUGH THAT LEAVES ME SHORT OF BREATH AND WITH A PAINFUL CHEST, HEADACHE, PLUS BLURRED VISION     CONSTANT BUT VARYING WEEZING SOUNDS COMING FROM MY CHEST     MORE PHYSICAL WEAKNESS THAN A COLD     MY BODY IS WARM TO THE TOUCH BUT I JUST DID 2 THERMOMETER READINGS AND THEY WERE ONLY 99.1     Anyway Yomaira, I was thinking about going to see the [facility] Nurse Practitioner at the [facility] but I wanted to share this with you and Dr Chris Spangler in case you might have a cancelation this afternoon.    LMK. -CARO AGUAYO

## 2020-03-23 ENCOUNTER — PATIENT MESSAGE (OUTPATIENT)
Dept: INTERNAL MEDICINE CLINIC | Age: 70
End: 2020-03-23

## 2020-03-23 RX ORDER — ATENOLOL 25 MG/1
TABLET ORAL
Qty: 90 TABLET | Refills: 1 | Status: SHIPPED | OUTPATIENT
Start: 2020-03-23 | End: 2020-06-03 | Stop reason: DRUGHIGH

## 2020-03-23 NOTE — TELEPHONE ENCOUNTER
From: Tiffany Khalil  To: Amber Baer MD  Sent: 3/23/2020 11:15 AM EDT  Subject: Prescription Question    I picked my last atenolol presentation from [address] but I have to pay out of pocket so I have asked [employer] to have you approve 90 days refills because they are free with free shipping. Thanks.  -Fredy Rodriguez

## 2020-04-08 ENCOUNTER — TELEPHONE (OUTPATIENT)
Dept: INTERNAL MEDICINE CLINIC | Age: 70
End: 2020-04-08

## 2020-04-08 NOTE — TELEPHONE ENCOUNTER
Non-Urgent Medical Question     From  Jyothi Hoffman \"Dominick Aguayo\" To  [place] [address] Practice Support Sent  4/7/2020  6:52 PM   First of all I want to take a moment to thank each and every care giver for being there for me and all of your patients! Thank for your brave and unwavering service! When you have the opportunity please provide me with my blood type. Thanks again! Be safe.

## 2020-04-20 ENCOUNTER — TELEPHONE (OUTPATIENT)
Dept: INTERNAL MEDICINE CLINIC | Age: 70
End: 2020-04-20

## 2020-05-11 ENCOUNTER — TELEPHONE (OUTPATIENT)
Dept: INTERNAL MEDICINE CLINIC | Age: 70
End: 2020-05-11

## 2020-05-11 NOTE — TELEPHONE ENCOUNTER
RE: Covid - 19 antibody testing     From  Lorie Gibson \"Dominick Dede\" To  [place] [address] Practice Support Sent  5/11/2020  6:55 AM   'Radha 4'     Good Monday Morning Yomaira! 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 My  services representative, Kat Langley, from The [address] [place] [address], asks that I have my primary care physician prepare a brief letter stating those conditions. This update will allow me to continue to take advantage of the 's disability benefits offered to me by The [address] [place] [address]. May I come to your  to  this document? Thanks to you and all of my heroes!  Antoinette Eduardo       Previous Messages

## 2020-05-12 ENCOUNTER — TELEPHONE (OUTPATIENT)
Dept: INTERNAL MEDICINE CLINIC | Age: 70
End: 2020-05-12

## 2020-05-12 NOTE — TELEPHONE ENCOUNTER
RE: Disability     From  Gilberto Fox \"Dominick Aguayo\" To  [facility] Practice Support Sent  5/12/2020  9:14 AM   Yomaira,     I'm available now so just ket me know what time you see for the first available opportunity will be and I should be able to accommodate you and Dr Marline Burns. Also let me know what communication media source application I need to have set up. FYI, [facility] the [facility].  Thanks, Thomas Shah

## 2020-05-13 ENCOUNTER — VIRTUAL VISIT (OUTPATIENT)
Dept: INTERNAL MEDICINE CLINIC | Age: 70
End: 2020-05-13
Payer: MEDICARE

## 2020-05-13 VITALS — TEMPERATURE: 97.5 F | HEIGHT: 77 IN | BODY MASS INDEX: 31.07 KG/M2

## 2020-05-13 PROCEDURE — 1123F ACP DISCUSS/DSCN MKR DOCD: CPT | Performed by: INTERNAL MEDICINE

## 2020-05-13 PROCEDURE — G8427 DOCREV CUR MEDS BY ELIG CLIN: HCPCS | Performed by: INTERNAL MEDICINE

## 2020-05-13 PROCEDURE — G8417 CALC BMI ABV UP PARAM F/U: HCPCS | Performed by: INTERNAL MEDICINE

## 2020-05-13 PROCEDURE — 3017F COLORECTAL CA SCREEN DOC REV: CPT | Performed by: INTERNAL MEDICINE

## 2020-05-13 PROCEDURE — 4040F PNEUMOC VAC/ADMIN/RCVD: CPT | Performed by: INTERNAL MEDICINE

## 2020-05-13 PROCEDURE — 1036F TOBACCO NON-USER: CPT | Performed by: INTERNAL MEDICINE

## 2020-05-13 PROCEDURE — 99213 OFFICE O/P EST LOW 20 MIN: CPT | Performed by: INTERNAL MEDICINE

## 2020-05-13 RX ORDER — ALBUTEROL SULFATE 90 UG/1
2 AEROSOL, METERED RESPIRATORY (INHALATION) 4 TIMES DAILY PRN
Qty: 3 INHALER | Refills: 1 | Status: SHIPPED
Start: 2020-05-13 | End: 2021-05-13 | Stop reason: CLARIF

## 2020-05-15 ENCOUNTER — TELEPHONE (OUTPATIENT)
Dept: INTERNAL MEDICINE CLINIC | Age: 70
End: 2020-05-15

## 2020-05-18 ENCOUNTER — TELEPHONE (OUTPATIENT)
Dept: INTERNAL MEDICINE CLINIC | Age: 70
End: 2020-05-18

## 2020-05-18 NOTE — TELEPHONE ENCOUNTER
RE:Disability form     From  Richard Solano \"Dominick Aguayo\" To  [facility] 111 Practice Support Sent  2020  3:11 PM   Thank you Yomaira for addressing the information needed to provide the Reggie Ruluba Toussaint in order to receive my  benefits. The 4 main items of disability they are concerned with are the followin. Dual foot trans-metatarsal amputations   2. High blood pressure for which Dr Billy Cope prescribes me the medication Atenolol   3. Shortness of breath (COPD) for which Dr Billy Cope prescribes me the medication Albuterol   4. Insomnia for which Dr Billy Cope has not prescribed me any medication yet but he is aware that I am taking an over the counter treatment named Melatonin that once was working for me but isn't working well any longer. Thanks again [facility] for all you do!    July Peña

## 2020-06-01 ENCOUNTER — TELEPHONE (OUTPATIENT)
Dept: INTERNAL MEDICINE CLINIC | Age: 70
End: 2020-06-01

## 2020-06-03 RX ORDER — ATENOLOL 100 MG/1
100 TABLET ORAL DAILY
Qty: 90 TABLET | Refills: 3 | Status: SHIPPED | OUTPATIENT
Start: 2020-06-03 | End: 2021-05-13

## 2020-06-03 NOTE — TELEPHONE ENCOUNTER
RE:Elevated blood pressure     From  Lorena Castro \"Dominick Aguayo\" To  [facility] [address]  6/2/2020 11:24 PM   Yomaira,   I just checked my [address] for an order status update looking for a pending prescription from your office resulting from our communication on Monday. [employer] has not yet received the new prescription for the 90 day supply of 100mg Atenolol Dr. Aarti Diana is recommending I start taking.    Thanks, Eleazar Ferreira       Previous Messages

## 2020-06-05 ENCOUNTER — TELEPHONE (OUTPATIENT)
Dept: INTERNAL MEDICINE CLINIC | Age: 70
End: 2020-06-05

## 2020-06-05 NOTE — TELEPHONE ENCOUNTER
RE:Disability form     From  Tal Saldana \"Dominick Aguayo\" To  [facility] Practice Support Sent  6/4/2020  7:08 PM   The original request for this document was submitted on the 11th of May. It has me a bit confused and concerned regarding doctor/patient interaction. If for some reason it may be that I don't fit the patient profile any longer that your practice wishes to serve, then please let me know if that is the case.      Thanks, Paul Stewart

## 2020-06-29 ENCOUNTER — TELEPHONE (OUTPATIENT)
Dept: INTERNAL MEDICINE CLINIC | Age: 70
End: 2020-06-29

## 2020-06-30 RX ORDER — TRAZODONE HYDROCHLORIDE 50 MG/1
50 TABLET ORAL NIGHTLY PRN
Qty: 30 TABLET | Refills: 0 | Status: SHIPPED | OUTPATIENT
Start: 2020-06-30 | End: 2020-12-30

## 2020-11-12 ENCOUNTER — NURSE ONLY (OUTPATIENT)
Dept: INTERNAL MEDICINE CLINIC | Age: 70
End: 2020-11-12
Payer: MEDICARE

## 2020-11-12 VITALS
HEART RATE: 54 BPM | SYSTOLIC BLOOD PRESSURE: 160 MMHG | DIASTOLIC BLOOD PRESSURE: 100 MMHG | OXYGEN SATURATION: 98 % | TEMPERATURE: 96.9 F

## 2020-11-12 PROCEDURE — G0008 ADMIN INFLUENZA VIRUS VAC: HCPCS | Performed by: INTERNAL MEDICINE

## 2020-11-12 PROCEDURE — 90694 VACC AIIV4 NO PRSRV 0.5ML IM: CPT | Performed by: INTERNAL MEDICINE

## 2020-11-12 NOTE — PROGRESS NOTES
Pt present for flu vaccine and complained of BP being elevated after med increase. BP check x2 opposite sides el read. Dr. Brayan Kim notified. Vaccine Information Sheet, \"Influenza - Inactivated\"  given to Lisa Gavin, or parent/legal guardian of  Lisa Gavin and verbalized understanding. Patient responses:    Have you ever had a reaction to a flu vaccine? No  Do you have any current illness? No  Have you ever had Guillian [place] Syndrome? No  Do you have a serious allergy to any of the follow: Neomycin, Polymyxin, Thimerosal, eggs or egg products? No    Flu vaccine given per order. Please see immunization tab. Risks and benefits explained. Current VIS given.

## 2020-12-29 ENCOUNTER — TELEPHONE (OUTPATIENT)
Dept: INTERNAL MEDICINE CLINIC | Age: 70
End: 2020-12-29

## 2020-12-29 NOTE — TELEPHONE ENCOUNTER
RE: Insomnia    From   [employer] \"Dominick Aguayo\" To   [facility] 111 Practice Support Sent   12/28/2020  1:05 PM   Renzo Burnette. . when I was in to get my flu shot the nurse that gave me the shot told Dr Olimpia Burns that my blood pressure was high and it was also noted that I missed my last recommended EKG so Dr Olimpia Burns said that I should schedule an office visit for an EKG. I'm also experiencing issues with my breathing along with wheezing 24/7. When you have the opportunity, please check to see if there might be any recent cancelations that I may be able to fill asap.  Thank you, Bety Goodrich

## 2020-12-30 ENCOUNTER — OFFICE VISIT (OUTPATIENT)
Dept: INTERNAL MEDICINE CLINIC | Age: 70
End: 2020-12-30
Payer: MEDICARE

## 2020-12-30 VITALS
HEIGHT: 77 IN | TEMPERATURE: 95.7 F | SYSTOLIC BLOOD PRESSURE: 160 MMHG | BODY MASS INDEX: 35.19 KG/M2 | DIASTOLIC BLOOD PRESSURE: 74 MMHG | WEIGHT: 298 LBS

## 2020-12-30 PROBLEM — L03.116 CELLULITIS OF LEFT ANKLE: Status: RESOLVED | Noted: 2017-01-20 | Resolved: 2020-12-30

## 2020-12-30 PROCEDURE — 3017F COLORECTAL CA SCREEN DOC REV: CPT | Performed by: INTERNAL MEDICINE

## 2020-12-30 PROCEDURE — 1123F ACP DISCUSS/DSCN MKR DOCD: CPT | Performed by: INTERNAL MEDICINE

## 2020-12-30 PROCEDURE — G0439 PPPS, SUBSEQ VISIT: HCPCS | Performed by: INTERNAL MEDICINE

## 2020-12-30 PROCEDURE — 93000 ELECTROCARDIOGRAM COMPLETE: CPT | Performed by: INTERNAL MEDICINE

## 2020-12-30 PROCEDURE — G8484 FLU IMMUNIZE NO ADMIN: HCPCS | Performed by: INTERNAL MEDICINE

## 2020-12-30 PROCEDURE — 4040F PNEUMOC VAC/ADMIN/RCVD: CPT | Performed by: INTERNAL MEDICINE

## 2020-12-30 RX ORDER — PREDNISONE 20 MG/1
40 TABLET ORAL DAILY
Qty: 10 TABLET | Refills: 0 | Status: SHIPPED | OUTPATIENT
Start: 2020-12-30 | End: 2021-01-04

## 2020-12-30 RX ORDER — MIRTAZAPINE 7.5 MG/1
7.5 TABLET, FILM COATED ORAL NIGHTLY
Qty: 30 TABLET | Refills: 2 | Status: SHIPPED | OUTPATIENT
Start: 2020-12-30 | End: 2021-02-11 | Stop reason: SDUPTHER

## 2020-12-30 ASSESSMENT — PATIENT HEALTH QUESTIONNAIRE - PHQ9
5. POOR APPETITE OR OVEREATING: 0
SUM OF ALL RESPONSES TO PHQ QUESTIONS 1-9: 12
2. FEELING DOWN, DEPRESSED OR HOPELESS: 3
9. THOUGHTS THAT YOU WOULD BE BETTER OFF DEAD, OR OF HURTING YOURSELF: 0
1. LITTLE INTEREST OR PLEASURE IN DOING THINGS: 3
SUM OF ALL RESPONSES TO PHQ QUESTIONS 1-9: 15
10. IF YOU CHECKED OFF ANY PROBLEMS, HOW DIFFICULT HAVE THESE PROBLEMS MADE IT FOR YOU TO DO YOUR WORK, TAKE CARE OF THINGS AT HOME, OR GET ALONG WITH OTHER PEOPLE: 2
4. FEELING TIRED OR HAVING LITTLE ENERGY: 3
SUM OF ALL RESPONSES TO PHQ QUESTIONS 1-9: 12
SUM OF ALL RESPONSES TO PHQ QUESTIONS 1-9: 15
5. POOR APPETITE OR OVEREATING: 0
8. MOVING OR SPEAKING SO SLOWLY THAT OTHER PEOPLE COULD HAVE NOTICED. OR THE OPPOSITE, BEING SO FIGETY OR RESTLESS THAT YOU HAVE BEEN MOVING AROUND A LOT MORE THAN USUAL: 0
SUM OF ALL RESPONSES TO PHQ QUESTIONS 1-9: 12
7. TROUBLE CONCENTRATING ON THINGS, SUCH AS READING THE NEWSPAPER OR WATCHING TELEVISION: 0
1. LITTLE INTEREST OR PLEASURE IN DOING THINGS: 3
2. FEELING DOWN, DEPRESSED OR HOPELESS: 3
SUM OF ALL RESPONSES TO PHQ QUESTIONS 1-9: 15
SUM OF ALL RESPONSES TO PHQ9 QUESTIONS 1 & 2: 6
7. TROUBLE CONCENTRATING ON THINGS, SUCH AS READING THE NEWSPAPER OR WATCHING TELEVISION: 0
4. FEELING TIRED OR HAVING LITTLE ENERGY: 3
6. FEELING BAD ABOUT YOURSELF - OR THAT YOU ARE A FAILURE OR HAVE LET YOURSELF OR YOUR FAMILY DOWN: 3
3. TROUBLE FALLING OR STAYING ASLEEP: 3
8. MOVING OR SPEAKING SO SLOWLY THAT OTHER PEOPLE COULD HAVE NOTICED. OR THE OPPOSITE, BEING SO FIGETY OR RESTLESS THAT YOU HAVE BEEN MOVING AROUND A LOT MORE THAN USUAL: 0
6. FEELING BAD ABOUT YOURSELF - OR THAT YOU ARE A FAILURE OR HAVE LET YOURSELF OR YOUR FAMILY DOWN: 3
9. THOUGHTS THAT YOU WOULD BE BETTER OFF DEAD, OR OF HURTING YOURSELF: 0
SUM OF ALL RESPONSES TO PHQ9 QUESTIONS 1 & 2: 6

## 2020-12-30 ASSESSMENT — LIFESTYLE VARIABLES
HAS A RELATIVE, FRIEND, DOCTOR, OR ANOTHER HEALTH PROFESSIONAL EXPRESSED CONCERN ABOUT YOUR DRINKING OR SUGGESTED YOU CUT DOWN: 0
AUDIT TOTAL SCORE: 3
HOW MANY STANDARD DRINKS CONTAINING ALCOHOL DO YOU HAVE ON A TYPICAL DAY: THREE OR FOUR
HOW OFTEN DURING THE LAST YEAR HAVE YOU HAD A FEELING OF GUILT OR REMORSE AFTER DRINKING: 0
HOW OFTEN DURING THE LAST YEAR HAVE YOU BEEN UNABLE TO REMEMBER WHAT HAPPENED THE NIGHT BEFORE BECAUSE YOU HAD BEEN DRINKING: 0
HOW OFTEN DURING THE LAST YEAR HAVE YOU NEEDED AN ALCOHOLIC DRINK FIRST THING IN THE MORNING TO GET YOURSELF GOING AFTER A NIGHT OF HEAVY DRINKING: 0
HOW OFTEN DO YOU HAVE SIX OR MORE DRINKS ON ONE OCCASION: 0
HOW OFTEN DURING THE LAST YEAR HAVE YOU FAILED TO DO WHAT WAS NORMALLY EXPECTED FROM YOU BECAUSE OF DRINKING: 0
HAS A RELATIVE, FRIEND, DOCTOR, OR ANOTHER HEALTH PROFESSIONAL EXPRESSED CONCERN ABOUT YOUR DRINKING OR SUGGESTED YOU CUT DOWN: 0
AUDIT-C TOTAL SCORE: 0
HOW OFTEN DURING THE LAST YEAR HAVE YOU FOUND THAT YOU WERE NOT ABLE TO STOP DRINKING ONCE YOU HAD STARTED: NEVER
HOW OFTEN DO YOU HAVE A DRINK CONTAINING ALCOHOL: 2
HAVE YOU OR SOMEONE ELSE BEEN INJURED AS A RESULT OF YOUR DRINKING: 0
HOW OFTEN DURING THE LAST YEAR HAVE YOU BEEN UNABLE TO REMEMBER WHAT HAPPENED THE NIGHT BEFORE BECAUSE YOU HAD BEEN DRINKING: 0
HOW OFTEN DO YOU HAVE SIX OR MORE DRINKS ON ONE OCCASION: 0
AUDIT-C TOTAL SCORE: 3
HOW OFTEN DO YOU HAVE SIX OR MORE DRINKS ON ONE OCCASION: NEVER
AUDIT TOTAL SCORE: 0
HOW OFTEN DURING THE LAST YEAR HAVE YOU FOUND THAT YOU WERE NOT ABLE TO STOP DRINKING ONCE YOU HAD STARTED: 0
HAVE YOU OR SOMEONE ELSE BEEN INJURED AS A RESULT OF YOUR DRINKING: 0
HOW OFTEN DURING THE LAST YEAR HAVE YOU HAD A FEELING OF GUILT OR REMORSE AFTER DRINKING: 0
AUDIT TOTAL SCORE: 3
HOW OFTEN DURING THE LAST YEAR HAVE YOU NEEDED AN ALCOHOLIC DRINK FIRST THING IN THE MORNING TO GET YOURSELF GOING AFTER A NIGHT OF HEAVY DRINKING: 0
HOW MANY STANDARD DRINKS CONTAINING ALCOHOL DO YOU HAVE ON A TYPICAL DAY: 1
HAS A RELATIVE, FRIEND, DOCTOR, OR ANOTHER HEALTH PROFESSIONAL EXPRESSED CONCERN ABOUT YOUR DRINKING OR SUGGESTED YOU CUT DOWN: NO
AUDIT-C TOTAL SCORE: 3
HOW OFTEN DURING THE LAST YEAR HAVE YOU FAILED TO DO WHAT WAS NORMALLY EXPECTED FROM YOU BECAUSE OF DRINKING: 0
HOW OFTEN DURING THE LAST YEAR HAVE YOU FAILED TO DO WHAT WAS NORMALLY EXPECTED FROM YOU BECAUSE OF DRINKING: NEVER
HAVE YOU OR SOMEONE ELSE BEEN INJURED AS A RESULT OF YOUR DRINKING: NO
HOW OFTEN DO YOU HAVE A DRINK CONTAINING ALCOHOL: TWO TO FOUR TIMES A MONTH
HOW MANY STANDARD DRINKS CONTAINING ALCOHOL DO YOU HAVE ON A TYPICAL DAY: 0
HOW OFTEN DO YOU HAVE A DRINK CONTAINING ALCOHOL: 3
HOW OFTEN DURING THE LAST YEAR HAVE YOU HAD A FEELING OF GUILT OR REMORSE AFTER DRINKING: NEVER
HOW OFTEN DURING THE LAST YEAR HAVE YOU FOUND THAT YOU WERE NOT ABLE TO STOP DRINKING ONCE YOU HAD STARTED: 0
HOW OFTEN DURING THE LAST YEAR HAVE YOU BEEN UNABLE TO REMEMBER WHAT HAPPENED THE NIGHT BEFORE BECAUSE YOU HAD BEEN DRINKING: NEVER
HOW OFTEN DURING THE LAST YEAR HAVE YOU NEEDED AN ALCOHOLIC DRINK FIRST THING IN THE MORNING TO GET YOURSELF GOING AFTER A NIGHT OF HEAVY DRINKING: NEVER

## 2020-12-30 ASSESSMENT — COLUMBIA-SUICIDE SEVERITY RATING SCALE - C-SSRS
2. HAVE YOU ACTUALLY HAD ANY THOUGHTS OF KILLING YOURSELF?: NO
6. HAVE YOU EVER DONE ANYTHING, STARTED TO DO ANYTHING, OR PREPARED TO DO ANYTHING TO END YOUR LIFE?: NO
1. WITHIN THE PAST MONTH, HAVE YOU WISHED YOU WERE DEAD OR WISHED YOU COULD GO TO SLEEP AND NOT WAKE UP?: NO

## 2020-12-30 NOTE — PROGRESS NOTES
Annual Wellness Visit     Patient:  López Boss                                               : 1950  Age: 69 y.o. MRN: <[id number]>  Date : 2020      CHIEF COMPLAINT: López Boss is a 69 y.o. male who presents for physical exam    1. Wellness examination  Generally feels okay but he has multiple complaints has been having some wheezing has insomnia and notes abdominal distention he did complete his hep C treatment he has had no recurrent problems with his osteomyelitis of his feet secondary to idiopathic peripheral neuropathy  - EKG 12 Lead  - External Referral To Ophthalmology  - CBC Auto Differential; Future  - Comprehensive Metabolic Panel; Future  - Lipid Panel; Future  - Psa screening; Future  - TSH without Reflex; Future  - Urinalysis; Future  - Vitamin D 25 Hydroxy; Future  - Cologuard (For External Results Only); Future    2. Essential hypertension  Problem is stable  - EKG 12 Lead  - External Referral To Ophthalmology  - CBC Auto Differential; Future  - Comprehensive Metabolic Panel; Future  - Lipid Panel; Future  - Psa screening; Future  - TSH without Reflex; Future  - Urinalysis; Future  - Vitamin D 25 Hydroxy; Future  - Cologuard (For External Results Only); Future    3. Idiopathic peripheral neuropathy  Problem is stable and unchanged    4. Hx of hepatitis C  Discussed treatment for hep C    5. Screening PSA (prostate specific antigen)    - Psa screening; Future    6. Vitamin D deficiency    - Vitamin D 25 Hydroxy; Future    7. History of smoking  Previous smoker  - US ABDOMINAL AORTA LIMITED; Future    8. Obesity (BMI 30-39. 9)  Evaluated for bariatric surgery at his request  - [address] [place] [address]. 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 ([address] 09/09/2017    Chronic ulcer of left ankle with fat layer exposed ([address] 01/20/2017    Toe osteomyelitis, right ([address] 09/11/2016    Idiopathic peripheral neuropathy     Essential hypertension     Cellulitis of foot, left 10/06/2015    Hx of hepatitis C 01/16/2015       Constitutional:  Denies fever or chills   Eyes:  Denies change in visual acuity   HENT:  Denies nasal congestion or sore throat   Respiratory:  Denies cough or shortness of breath   Cardiovascular:  Denies chest pain or edema   GI:  Denies abdominal pain, nausea, vomiting, bloody stools or diarrhea   :  Denies dysuria   Musculoskeletal:  Denies back pain or joint pain   Integument:  Denies rash   Neurologic:  Denies headache, focal weakness or sensory changes   Endocrine:  Denies polyuria or polydipsia   Lymphatic:  Denies swollen glands   Psychiatric:  Denies depression or anxiety     Past Medical History:        Diagnosis Date    Chest pain 1/2/2015    ESBL (extended spectrum beta-lactamase) producing bacteria infection 09/11/2016    E.coli ESBL-8/2015    Hepatitis C virus 1/2/2015    Hx of hepatitis C 1/16/2015    Hypertension     MDRO (multiple drug resistant organisms) resistance 09/10/2016    Pseudomonas MDRO in foot 6/23/16    MDRO (multiple drug resistant organisms) resistance 9/22/17; 09/08/2017    Pseudomonas MDR in wound    Non-pressure chronic ulcer of other part of left foot limited to breakdown of skin ([address]     Non-pressure chronic ulcer of other part of right foot limited to breakdown of skin ([address]     Peripheral neuropathy 1/2/2015       Past Surgical History:        Procedure Laterality Date    FOOT SURGERY Bilateral 10/8/2015    left foot bone biopsy X 2, bilat wound debridement, bilat.  amnio graft    FOOT SURGERY Right 09/13/2016    TRANSMETATARSAL AMPUTATION RIGHT FOOT;    FOOT SURGERY Left 09/25/2017 INCISION AND DRAINAGE WITH EXCISION OF ALL NECROTIC SOFT TISSUE AND BONE, LEFT FOOT WITH APPLICATION OF WOUND VAC    OTHER SURGICAL HISTORY  2016    PARTIAL FIRST RAY TOE AMPUTATION - RIGHT FOOT (VANCOMYCIN    OTHER SURGICAL HISTORY Left 2017     TRANSMETATARSAL AMPUTATION LEFT FOOT ;    OTHER SURGICAL HISTORY Left 2017    INCISION AND DRAINAGE OF NECROTIC TISSUE AND BONE WITH RE-    OTHER SURGICAL HISTORY Left 2017    RESECTION FIRST METATARSAL WITH PARTIAL RESECTION SECOND,    TONSILLECTOMY         Family History:  No family history on file. Social History:  Social History     Socioeconomic History    Marital status:       Spouse name: None    Number of children: None    Years of education: None    Highest education level: None   Occupational History    None   Social Needs    Financial resource strain: None    Food insecurity     Worry: None     Inability: None    Transportation needs     Medical: None     Non-medical: None   Tobacco Use    Smoking status: Former Smoker     Packs/day: 0.25     Years: 5.00     Pack years: 1.25     Quit date: 2016     Years since quittin.3    Smokeless tobacco: Never Used   Substance and Sexual Activity    Alcohol use: Yes     Comment: at times    Drug use: No    Sexual activity: Yes     Partners: Female   Lifestyle    Physical activity     Days per week: None     Minutes per session: None    Stress: None   Relationships    Social connections     Talks on phone: None     Gets together: None     Attends Taoism service: None     Active member of club or organization: None     Attends meetings of clubs or organizations: None     Relationship status: None    Intimate partner violence     Fear of current or ex partner: None     Emotionally abused: None     Physically abused: None     Forced sexual activity: None   Other Topics Concern    None   Social History Narrative    None Allergies:  Clindamycin/lincomycin    Current Medications:    Prior to Admission medications    Medication Sig Start Date End Date Taking? Authorizing Provider   predniSONE (DELTASONE) 20 MG tablet Take 2 tablets by mouth daily for 5 days 12/30/20 1/4/21 Yes James Villatoro MD   mirtazapine (REMERON) 7.5 MG tablet Take 1 tablet by mouth nightly 12/30/20  Yes James Villatoro MD   atenolol (TENORMIN) 100 MG tablet Take 1 tablet by mouth daily 6/3/20  Yes James Villatoro MD   sildenafil (VIAGRA) 100 MG tablet Take one tablet by mouth as needed 1 hour prior to sexual activity. 12/6/19  Yes James Villatoro MD   b complex vitamins capsule Take 1 capsule by mouth daily   Yes Historical Provider, MD   Multiple Vitamins-Minerals (SENTRY SENIOR) TABS Take 1 tablet by mouth daily   Yes Historical Provider, MD   Omega 3-6-9 Fatty Acids (OMEGA 3-6-9 COMPLEX) CAPS Take 1 tablet by mouth daily   Yes Historical Provider, MD   Potassium Gluconate 595 (99 K) MG TABS Take 1 tablet by mouth daily   Yes Historical Provider, MD   zinc gluconate 50 MG tablet Take 50 mg by mouth daily   Yes Historical Provider, MD   albuterol sulfate HFA (VENTOLIN HFA) 108 (90 Base) MCG/ACT inhaler Inhale 2 puffs into the lungs 4 times daily as needed for Wheezing 5/13/20   James Villatoro MD   Rosie Lush Oil 1000 MG CAPS Take 1 tablet by mouth daily    Historical Provider, MD   acetaminophen (TYLENOL) 325 MG tablet Take 650 mg by mouth every 6 hours as needed for Pain    Historical Provider, MD           Physical Exam:      Constitutional:  Well developed, well nourished, no acute distress, non-toxic appearance   Eyes:  PERRL, conjunctiva normal   HENT:  Atraumatic, external ears normal, nose normal, oropharynx moist, no pharyngeal exudates.  Neck- normal range of motion, no tenderness, supple   Respiratory:  No respiratory distress, normal breath sounds, no rales, no wheezing   Cardiovascular:  Normal rate, normal rhythm, no murmurs, no gallops, no rubs GI:  Soft, nondistended, normal bowel sounds, nontender, no organomegaly, no mass, no rebound, no guarding   :  No costovertebral angle tenderness   Musculoskeletal:  No edema, no tenderness, no deformities. Back- no tenderness  Integument:  Well hydrated, no rash   Lymphatic:  No lymphadenopathy noted   Neurologic:  Alert & oriented x 3, CN 2-12 normal, normal motor function, sensory neuropathy no focal deficits noted   Psychiatric:  Speech and behavior appropriate   Remedy exam reveals partial foot amputations bilaterally and evidence of significant peripheral neuropathy mostly sensory    Vitals: BP (!) 160/74   Temp 95.7 °F (35.4 °C)   Ht 6' 5\" (1.956 m)   Wt 298 lb (135.2 kg)   BMI 35.34 kg/m²     Body mass index is 35.34 kg/m².   Wt Readings from Last 3 Encounters:   12/30/20 298 lb (135.2 kg)   05/23/19 262 lb (118.8 kg)   05/09/19 257 lb 4.4 oz (116.7 kg)         LABS:    CBC:   Lab Results   Component Value Date    WBC 5.7 09/06/2018    HGB 15.8 09/06/2018    HCT 46.7 09/06/2018    MCV 92.2 09/06/2018     09/06/2018           Lab Results   Component Value Date    FOLATE 12.97 10/06/2015    [id number] 431 10/06/2015                                                             BMP:    Lab Results   Component Value Date     09/06/2018    K 5.2 (H) 09/06/2018     09/06/2018    CO2 24 09/06/2018       LFT's:   Lab Results   Component Value Date    ALT 36 01/02/2018    AST 28 01/02/2018    ALKPHOS 61 01/02/2018    BILITOT 0.3 01/02/2018       Lipids:   Lab Results   Component Value Date    CHOL 154 01/02/2015    HDL 51 01/02/2015    LDLCALC 83 01/02/2015    TRIG 99 01/02/2015       INR:   Lab Results   Component Value Date    INR 0.98 10/04/2017    INR 1.08 09/27/2017    INR 1.15 09/22/2017    PROTIME 11.1 10/04/2017    PROTIME 12.2 09/27/2017    PROTIME 13.0 09/22/2017       U/A:  Lab Results   Component Value Date    LABMICR YES 09/29/2017          Lab Results   Component Value Date

## 2020-12-30 NOTE — PROGRESS NOTES
Medicare Annual Wellness Visit  Name: Margarita Desouza Date: 2020   MRN: <[id number]> Sex: Male   Age: 69 y.o. Ethnicity: Non-/Non    : 1950 Race: Michelle Gomez is here for Medicare AWV    Screenings for behavioral, psychosocial and functional/safety risks, and cognitive dysfunction are all negative except as indicated below. These results, as well as other patient data from the [address] form, are documented in Flowsheets linked to this Encounter. Allergies   Allergen Reactions    Clindamycin/Lincomycin Rash       Prior to Visit Medications    Medication Sig Taking? Authorizing Provider   atenolol (TENORMIN) 100 MG tablet Take 1 tablet by mouth daily Yes Mae Palacios MD   sildenafil (VIAGRA) 100 MG tablet Take one tablet by mouth as needed 1 hour prior to sexual activity.  Yes Mae Palacios MD   b complex vitamins capsule Take 1 capsule by mouth daily Yes Historical Provider, MD   Multiple Vitamins-Minerals (SENTRY SENIOR) TABS Take 1 tablet by mouth daily Yes Historical Provider, MD   Omega 3-6-9 Fatty Acids (OMEGA 3-6-9 COMPLEX) CAPS Take 1 tablet by mouth daily Yes Historical Provider, MD   Potassium Gluconate 595 (99 K) MG TABS Take 1 tablet by mouth daily Yes Historical Provider, MD   zinc gluconate 50 MG tablet Take 50 mg by mouth daily Yes Historical Provider, MD   albuterol sulfate HFA (VENTOLIN HFA) 108 (90 Base) MCG/ACT inhaler Inhale 2 puffs into the lungs 4 times daily as needed for Wheezing  Mae Palacios MD   Hardy Barnacle Oil 1000 MG CAPS Take 1 tablet by mouth daily  Historical Provider, MD   acetaminophen (TYLENOL) 325 MG tablet Take 650 mg by mouth every 6 hours as needed for Pain  Historical Provider, MD       Past Medical History:   Diagnosis Date    Chest pain 2015    ESBL (extended spectrum beta-lactamase) producing bacteria infection 2016    E.coli ESBL-2015    Hepatitis C virus 2015    Hx of hepatitis C 2015  Hypertension     MDRO (multiple drug resistant organisms) resistance 09/10/2016    Pseudomonas MDRO in foot 6/23/16    MDRO (multiple drug resistant organisms) resistance 9/22/17; 09/08/2017    Pseudomonas MDR in wound    Non-pressure chronic ulcer of other part of left foot limited to breakdown of skin ([facility]     Non-pressure chronic ulcer of other part of right foot limited to breakdown of skin ([facility]     Peripheral neuropathy 1/2/2015       Past Surgical History:   Procedure Laterality Date    FOOT SURGERY Bilateral 10/8/2015    left foot bone biopsy X 2, bilat wound debridement, bilat. amnio graft    FOOT SURGERY Right 09/13/2016    TRANSMETATARSAL AMPUTATION RIGHT FOOT;    FOOT SURGERY Left 09/25/2017    INCISION AND DRAINAGE WITH EXCISION OF ALL NECROTIC SOFT TISSUE AND BONE, LEFT FOOT WITH APPLICATION OF WOUND VAC    OTHER SURGICAL HISTORY  09/11/2016    PARTIAL FIRST RAY TOE AMPUTATION - RIGHT FOOT (VANCOMYCIN    OTHER SURGICAL HISTORY Left 09/08/2017     TRANSMETATARSAL AMPUTATION LEFT FOOT ;    OTHER SURGICAL HISTORY Left 09/27/2017    INCISION AND DRAINAGE OF NECROTIC TISSUE AND BONE WITH RE-    OTHER SURGICAL HISTORY Left 09/30/2017    RESECTION FIRST METATARSAL WITH PARTIAL RESECTION SECOND,    TONSILLECTOMY         No family history on file.     CareTeam (Including outside providers/suppliers regularly involved in providing care):   Patient Care Team:  Saba Goldstein MD as PCP - General (Internal Medicine)  Saba Goldstein MD as PCP - REHABILITATION [facility] Empaneled Provider  Demond Saenz MD as Consulting Physician (Infectious Diseases)  Prashanth Gaming DPM as Consulting Physician (Podiatry)  Allen Sainz MD as Consulting Physician (Otolaryngology)    Wt Readings from Last 3 Encounters:   12/30/20 298 lb (135.2 kg)   05/23/19 262 lb (118.8 kg)   05/09/19 257 lb 4.4 oz (116.7 kg)     Vitals:    12/30/20 1626 12/30/20 1632   BP: (!) 161/74 (!) 160/74   Temp: 95.7 °F (35.4 °C) · Patient advised to follow-up in this office for further evaluation and treatment within 1 week      General Health and ACP:  General  In general, how would you say your health is?: (!) Poor  In the past 7 days, have you experienced any of the following?  New or Increased Pain, New or Increased Fatigue, Loneliness, Social Isolation, Stress or Anger?: (!) New or Increased Fatigue, Stress  Do you get the social and emotional support that you need?: Yes  Do you have a Living Will?: (!) No  Advance Directives     Power of  Living Will ACP-Advance Directive ACP-Power of     Not on File Not on File Not on File Not on File      General Health Risk Interventions:  · Poor self-assessment of health status: patient declines any further evaluation/treatment for this issue    Health Habits/Nutrition:  Health Habits/Nutrition  Do you exercise for at least 20 minutes 2-3 times per week?: (!) No  Have you lost any weight without trying in the past 3 months?: No  Do you eat fewer than 2 meals per day?: No  Have you seen a dentist within the past year?: Yes  Body mass index: (!) 35.33  Health Habits/Nutrition Interventions:  · Inadequate physical activity:  patient agrees to exercise for at least 150 minutes/week    Hearing/Vision:  No exam data present  Hearing/Vision  Do you or your family notice any trouble with your hearing?: No  Do you have difficulty driving, watching TV, or doing any of your daily activities because of your eyesight?: No  Have you had an eye exam within the past year?: (!) No  Hearing/Vision Interventions:  · Vision concerns:  patient declines any further evaluation/treatment for this issue      Personalized Preventive Plan   Current Health Maintenance Status  Immunization History   Administered Date(s) Administered    Influenza 12/16/2014    Influenza Vaccine, unspecified formulation 09/14/2017, 09/01/2018    Influenza Virus Vaccine 10/09/2015  Influenza, High Dose (Fluzone 65 yrs and older) 09/20/2016    Influenza, Quadv, adjuvanted, 65 yrs +, IM, PF (Fluad) 11/12/2020    Influenza, Triv, inactivated, subunit, adjuvanted, IM (Fluad 65 yrs and older) 11/27/2019    Pneumococcal Conjugate 13-valent ([id number]) 01/21/2017    Pneumococcal Polysaccharide ([id number]) 12/09/2014    Zoster Live (Zostavax) 12/16/2014        Health Maintenance   Topic Date Due    AAA screen  1950    Hepatitis B vaccine (1 of 3 - Risk 3-dose series) 08/02/1968    DTaP/Tdap/Td vaccine (1 - Tdap) 08/02/1968    Shingles Vaccine (2 of 3) 02/10/2015    Annual Wellness Visit (AWV)  05/29/2019    Potassium monitoring  09/06/2019    Creatinine monitoring  09/06/2019    Lipid screen  01/02/2020    Colon cancer screen colonoscopy  06/06/2026    Flu vaccine  Completed    Pneumococcal 65+ years Vaccine  Completed    Hepatitis A vaccine  Aged Out    Hib vaccine  Aged Out    Meningococcal (ACWY) vaccine  Aged Out     Recommendations for [employer] Due: see orders and patient instructions/AVS.  . Recommended screening schedule for the next 5-10 years is provided to the patient in written form: see Patient Instructions/AVS.    Alyse Yates was seen today for medicare aw.     Diagnoses and all orders for this visit:    Wellness examination  -     EKG 12 Lead    Essential hypertension  -     EKG 12 Lead    Idiopathic peripheral neuropathy    Hx of hepatitis C

## 2021-01-11 DIAGNOSIS — E55.9 VITAMIN D DEFICIENCY: ICD-10-CM

## 2021-01-11 DIAGNOSIS — Z00.00 WELLNESS EXAMINATION: ICD-10-CM

## 2021-01-11 DIAGNOSIS — Z12.5 SCREENING PSA (PROSTATE SPECIFIC ANTIGEN): ICD-10-CM

## 2021-01-11 DIAGNOSIS — I10 ESSENTIAL HYPERTENSION: ICD-10-CM

## 2021-01-12 DIAGNOSIS — D72.829 LEUKOCYTOSIS, UNSPECIFIED TYPE: Primary | ICD-10-CM

## 2021-01-12 LAB
A/G RATIO: 2.1 (ref 1.1–2.2)
ALBUMIN SERPL-MCNC: 4.9 G/DL (ref 3.4–5)
ALP BLD-CCNC: 74 U/L (ref 40–129)
ALT SERPL-CCNC: 53 U/L (ref 10–40)
ANION GAP SERPL CALCULATED.3IONS-SCNC: 18 MMOL/L (ref 3–16)
AST SERPL-CCNC: 36 U/L (ref 15–37)
BASOPHILS ABSOLUTE: 0.1 K/UL (ref 0–0.2)
BASOPHILS RELATIVE PERCENT: 0.7 %
BILIRUB SERPL-MCNC: 0.5 MG/DL (ref 0–1)
BILIRUBIN URINE: NEGATIVE
BLOOD, URINE: NEGATIVE
BUN BLDV-MCNC: 20 MG/DL (ref 7–20)
CALCIUM SERPL-MCNC: 9.4 MG/DL (ref 8.3–10.6)
CHLORIDE BLD-SCNC: 102 MMOL/L (ref 99–110)
CHOLESTEROL, TOTAL: 224 MG/DL (ref 0–199)
CLARITY: CLEAR
CO2: 21 MMOL/L (ref 21–32)
COLOR: YELLOW
CREAT SERPL-MCNC: 1.3 MG/DL (ref 0.8–1.3)
EOSINOPHILS ABSOLUTE: 0.1 K/UL (ref 0–0.6)
EOSINOPHILS RELATIVE PERCENT: 2 %
GFR AFRICAN AMERICAN: >60
GFR NON-AFRICAN AMERICAN: 54
GLOBULIN: 2.3 G/DL
GLUCOSE BLD-MCNC: 125 MG/DL (ref 70–99)
GLUCOSE URINE: NEGATIVE MG/DL
HCT VFR BLD CALC: 54.3 % (ref 40.5–52.5)
HDLC SERPL-MCNC: 47 MG/DL (ref 40–60)
HEMOGLOBIN: 18.2 G/DL (ref 13.5–17.5)
KETONES, URINE: NEGATIVE MG/DL
LDL CHOLESTEROL CALCULATED: 125 MG/DL
LEUKOCYTE ESTERASE, URINE: NEGATIVE
LYMPHOCYTES ABSOLUTE: 1.2 K/UL (ref 1–5.1)
LYMPHOCYTES RELATIVE PERCENT: 16.9 %
MCH RBC QN AUTO: 32.4 PG (ref 26–34)
MCHC RBC AUTO-ENTMCNC: 33.4 G/DL (ref 31–36)
MCV RBC AUTO: 97 FL (ref 80–100)
MICROSCOPIC EXAMINATION: NORMAL
MONOCYTES ABSOLUTE: 0.7 K/UL (ref 0–1.3)
MONOCYTES RELATIVE PERCENT: 9.3 %
NEUTROPHILS ABSOLUTE: 5.2 K/UL (ref 1.7–7.7)
NEUTROPHILS RELATIVE PERCENT: 71.1 %
NITRITE, URINE: NEGATIVE
PDW BLD-RTO: 14.2 % (ref 12.4–15.4)
PH UA: 5.5 (ref 5–8)
PLATELET # BLD: 133 K/UL (ref 135–450)
PMV BLD AUTO: 10.4 FL (ref 5–10.5)
POTASSIUM SERPL-SCNC: 5.1 MMOL/L (ref 3.5–5.1)
PROSTATE SPECIFIC ANTIGEN: 2.18 NG/ML (ref 0–4)
PROTEIN UA: NEGATIVE MG/DL
RBC # BLD: 5.6 M/UL (ref 4.2–5.9)
SODIUM BLD-SCNC: 141 MMOL/L (ref 136–145)
SPECIFIC GRAVITY UA: 1.02 (ref 1–1.03)
TOTAL PROTEIN: 7.2 G/DL (ref 6.4–8.2)
TRIGL SERPL-MCNC: 259 MG/DL (ref 0–150)
TSH SERPL DL<=0.05 MIU/L-ACNC: 2.81 UIU/ML (ref 0.27–4.2)
URINE TYPE: 1
UROBILINOGEN, URINE: 0.2 E.U./DL
VITAMIN D 25-HYDROXY: 34.4 NG/ML
VLDLC SERPL CALC-MCNC: 52 MG/DL
WBC # BLD: 7.4 K/UL (ref 4–11)

## 2021-01-13 DIAGNOSIS — D72.829 LEUKOCYTOSIS, UNSPECIFIED TYPE: ICD-10-CM

## 2021-01-17 LAB — JAK2 V617F MUTATION: 0 %

## 2021-01-28 ENCOUNTER — HOSPITAL ENCOUNTER (OUTPATIENT)
Dept: ULTRASOUND IMAGING | Age: 71
Discharge: HOME | End: 2021-01-28
Payer: MEDICARE

## 2021-01-28 DIAGNOSIS — F17.200 SMOKER: ICD-10-CM

## 2021-01-28 PROCEDURE — 76706 US ABDL AORTA SCREEN AAA: CPT

## 2021-02-11 RX ORDER — MIRTAZAPINE 7.5 MG/1
7.5 TABLET, FILM COATED ORAL NIGHTLY
Qty: 90 TABLET | Refills: 1 | OUTPATIENT
Start: 2021-02-11 | End: 2021-05-17 | Stop reason: SDUPTHER

## 2021-02-20 ENCOUNTER — IMMUNIZATION (OUTPATIENT)
Dept: FAMILY MEDICINE CLINIC | Age: 71
End: 2021-02-20
Payer: MEDICARE

## 2021-02-20 PROCEDURE — 0001A COVID-19, PFIZER VACCINE 30MCG/0.3ML DOSE: CPT | Performed by: FAMILY MEDICINE

## 2021-02-20 PROCEDURE — 91300 COVID-19, PFIZER VACCINE 30MCG/0.3ML DOSE: CPT | Performed by: FAMILY MEDICINE

## 2021-03-13 ENCOUNTER — IMMUNIZATION (OUTPATIENT)
Dept: FAMILY MEDICINE CLINIC | Age: 71
End: 2021-03-13
Payer: MEDICARE

## 2021-03-13 PROCEDURE — 0002A COVID-19, PFIZER VACCINE 30MCG/0.3ML DOSE: CPT | Performed by: NURSE PRACTITIONER

## 2021-03-13 PROCEDURE — 91300 COVID-19, PFIZER VACCINE 30MCG/0.3ML DOSE: CPT | Performed by: NURSE PRACTITIONER

## 2021-03-31 ENCOUNTER — TELEPHONE (OUTPATIENT)
Dept: BARIATRICS/WEIGHT MGMT | Age: 71
End: 2021-03-31

## 2021-03-31 NOTE — TELEPHONE ENCOUNTER
Patient was sent dr Rama Mayorga digital bariatric seminar. He DOES have BWLS coverage with [employer] (No req diet)     *Spoke with pt. Info given. Pt verbalized understanding. Appt sched. Np pk mailed. Per pt no history of wgt loss surgery.  BMI >35

## 2021-05-03 ENCOUNTER — TELEPHONE (OUTPATIENT)
Dept: BARIATRICS/WEIGHT MGMT | Age: 71
End: 2021-05-03

## 2021-05-03 NOTE — TELEPHONE ENCOUNTER
Called as a new pt courtesy call - left message. Told patient to have new pt paperwork completely filled out, insurance card, and id and to arrive on time to appointment. If they didn't have the paperwork filled out and arrive on time may be rescheduled. Also stated if they didn't receive paperwork to let us know so we could get it to them another way. Left office number on message.  Told to arrive @ 1 @ \[facility]\""

## 2021-05-05 ENCOUNTER — OFFICE VISIT (OUTPATIENT)
Dept: BARIATRICS/WEIGHT MGMT | Age: 71
End: 2021-05-05
Payer: MEDICARE

## 2021-05-05 VITALS — WEIGHT: 303.8 LBS | SYSTOLIC BLOOD PRESSURE: 152 MMHG | DIASTOLIC BLOOD PRESSURE: 89 MMHG | BODY MASS INDEX: 36.03 KG/M2

## 2021-05-05 DIAGNOSIS — M17.12 PRIMARY OSTEOARTHRITIS OF LEFT KNEE: ICD-10-CM

## 2021-05-05 DIAGNOSIS — Z78.9 ALCOHOL USE: ICD-10-CM

## 2021-05-05 DIAGNOSIS — Z72.0 TOBACCO USE: ICD-10-CM

## 2021-05-05 DIAGNOSIS — I10 ESSENTIAL HYPERTENSION: ICD-10-CM

## 2021-05-05 DIAGNOSIS — E66.01 SEVERE OBESITY (BMI 35.0-39.9) WITH COMORBIDITY (HCC): Primary | ICD-10-CM

## 2021-05-05 PROCEDURE — 1036F TOBACCO NON-USER: CPT | Performed by: SURGERY

## 2021-05-05 PROCEDURE — 4040F PNEUMOC VAC/ADMIN/RCVD: CPT | Performed by: SURGERY

## 2021-05-05 PROCEDURE — G8417 CALC BMI ABV UP PARAM F/U: HCPCS | Performed by: SURGERY

## 2021-05-05 PROCEDURE — 3017F COLORECTAL CA SCREEN DOC REV: CPT | Performed by: SURGERY

## 2021-05-05 PROCEDURE — 99204 OFFICE O/P NEW MOD 45 MIN: CPT | Performed by: SURGERY

## 2021-05-05 PROCEDURE — 1123F ACP DISCUSS/DSCN MKR DOCD: CPT | Performed by: SURGERY

## 2021-05-05 PROCEDURE — G8427 DOCREV CUR MEDS BY ELIG CLIN: HCPCS | Performed by: SURGERY

## 2021-05-05 RX ORDER — ESOMEPRAZOLE MAGNESIUM 20 MG/1
TABLET, DELAYED RELEASE ORAL
Status: ON HOLD | COMMUNITY
End: 2021-10-05

## 2021-05-05 NOTE — PROGRESS NOTES
[facility]) Physicians   General & Laparoscopic Surgery  [employer]      HPI:    Kenzie Pierre is a very pleasant 69 y.o. obese male ,   Body mass index is 36.03 kg/m². And multiple medical problems who is presenting for weight loss surgery evaluation and consultation by Dr. Rhoda Phoenix. Patient has been struggling for several years now with obesity. Patient feels the weight is an obstacle to achieve and perform things in daily living as well risk on health. Tries to diet, and exercise but can't keep the weight off. Patient tried other regimens, but with no sustainable weight loss. Patient  is very determined to lose weight and be healthy, and is interested in surgical weight loss to achieve this goal.    Otherwise patient denies any nausea, vomiting, fevers, chills, shortness of breath, chest pain, constipation or urinary symptoms. Pain Assessment   Denies any abdominal pain     Past Medical History:   Diagnosis Date    Chest pain 1/2/2015    Chronic GERD     ESBL (extended spectrum beta-lactamase) producing bacteria infection 09/11/2016    E.coli ESBL-8/2015    Hepatitis C virus 1/2/2015    Hx of hepatitis C 1/16/2015    Hyperlipidemia     Hypertension     Hypertension     Hypertension, essential     MDRO (multiple drug resistant organisms) resistance 09/10/2016    Pseudomonas MDRO in foot 6/23/16    MDRO (multiple drug resistant organisms) resistance 9/22/17; 09/08/2017    Pseudomonas MDR in wound    Morbid obesity ([address]     Non-pressure chronic ulcer of other part of left foot limited to breakdown of skin ([address]     Non-pressure chronic ulcer of other part of right foot limited to breakdown of skin ([address]     Obstructive sleep apnea     Peripheral neuropathy 1/2/2015    Pre-operative clearance      Past Surgical History:   Procedure Laterality Date    FOOT SURGERY Bilateral 10/8/2015    left foot bone biopsy X 2, bilat wound debridement, bilat.  amnio graft  FOOT SURGERY Right 2016    TRANSMETATARSAL AMPUTATION RIGHT FOOT;    FOOT SURGERY Left 2017    INCISION AND DRAINAGE WITH EXCISION OF ALL NECROTIC SOFT TISSUE AND BONE, LEFT FOOT WITH APPLICATION OF WOUND VAC    OTHER SURGICAL HISTORY  2016    PARTIAL FIRST RAY TOE AMPUTATION - RIGHT FOOT (VANCOMYCIN    OTHER SURGICAL HISTORY Left 2017     TRANSMETATARSAL AMPUTATION LEFT FOOT ;    OTHER SURGICAL HISTORY Left 2017    INCISION AND DRAINAGE OF NECROTIC TISSUE AND BONE WITH RE-    OTHER SURGICAL HISTORY Left 2017    RESECTION FIRST METATARSAL WITH PARTIAL RESECTION SECOND,    TONSILLECTOMY       No family history on file. Social History     Tobacco Use    Smoking status: Former Smoker     Packs/day: 0.25     Years: 5.00     Pack years: 1.25     Quit date: 2016     Years since quittin.6    Smokeless tobacco: Never Used   Substance Use Topics    Alcohol use: Yes     Comment: at times     I counseled the patient on the importance of not smoking and risks of ETOH. Allergies   Allergen Reactions    Clindamycin/Lincomycin Rash     Vitals:    21 1320   BP: (!) 152/89   Site: Right Wrist   Weight: (!) 303 lb 12.8 oz (137.8 kg)       Body mass index is 36.03 kg/m². Current Outpatient Medications:     mirtazapine (REMERON) 7.5 MG tablet, Take 1 tablet by mouth nightly, Disp: 90 tablet, Rfl: 1    atenolol (TENORMIN) 100 MG tablet, Take 1 tablet by mouth daily, Disp: 90 tablet, Rfl: 3    Esomeprazole Magnesium 20 MG TBEC, , Disp: , Rfl:     albuterol sulfate HFA (VENTOLIN HFA) 108 (90 Base) MCG/ACT inhaler, Inhale 2 puffs into the lungs 4 times daily as needed for Wheezing, Disp: 3 Inhaler, Rfl: 1    sildenafil (VIAGRA) 100 MG tablet, Take one tablet by mouth as needed 1 hour prior to sexual activity. , Disp: 6 tablet, Rfl: 3    Krill Oil 1000 MG CAPS, Take 1 tablet by mouth daily, Disp: , Rfl:     b complex vitamins capsule, Take 1 capsule by mouth daily, Disp: , Rfl:     Multiple Vitamins-Minerals (SENTRY SENIOR) TABS, Take 1 tablet by mouth daily, Disp: , Rfl:     Omega 3-6-9 Fatty Acids (OMEGA 3-6-9 COMPLEX) CAPS, Take 1 tablet by mouth daily, Disp: , Rfl:     Potassium Gluconate 595 (99 K) MG TABS, Take 1 tablet by mouth daily, Disp: , Rfl:     zinc gluconate 50 MG tablet, Take 50 mg by mouth daily, Disp: , Rfl:     acetaminophen (TYLENOL) 325 MG tablet, Take 650 mg by mouth every 6 hours as needed for Pain, Disp: , Rfl:       Review of Systems - History obtained from the patient  General ROS: negative  Psychological ROS: negative  Ophthalmic ROS: negative  Neurological ROS: negative  ENT ROS: negative  Allergy and Immunology ROS: negative  Hematological and Lymphatic ROS: negative  Endocrine ROS: negative  Respiratory ROS: negative  Cardiovascular ROS: negative  Gastrointestinal ROS:negative  Genito-Urinary ROS: negative  Musculoskeletal ROS: negative   Skin ROS: negative    Physical Exam   Constitutional: Patient is oriented to person, place, and time. Vital signs are normal. Patient  appears well-developed and well-nourished. Patient  is active and cooperative. Non-toxic appearance. No distress. HENT:   Head: Normocephalic and atraumatic. Head is without laceration. Right Ear: External ear normal. No lacerations. No drainage, swelling or tenderness. Left Ear: External ear normal. No lacerations. No drainage, swelling or tenderness. Nose: Nose normal. No nose lacerations or nasal deformity. Mouth/Throat: Uvula is midline, oropharynx is clear and moist and mucous membranes are normal. No oropharyngeal exudate. Eyes: Conjunctivae, EOM and lids are normal. Pupils are equal, round, and reactive to light. Right eye exhibits no discharge. No foreign body present in the right eye. Left eye exhibits no discharge. No foreign body present in the left eye. No scleral icterus. Neck: Trachea normal and normal range of motion. Neck supple.  No JVD present. No tracheal tenderness present. Carotid bruit is not present. No rigidity. No tracheal deviation and no edema present. No thyromegaly present. Cardiovascular: Normal rate, regular rhythm, normal heart sounds, intact distal pulses and normal pulses. Pulmonary/Chest: Effort normal and breath sounds normal. No stridor. No respiratory distress. Patient  has no wheezes. Patient has no rales. Patient exhibits no tenderness and no crepitus. Abdominal: Soft. Normal appearance and bowel sounds are normal. Patient exhibits no distension, no abdominal bruit, no ascites and no mass. There is no hepatosplenomegaly. There is no tenderness. There is no rigidity, no rebound, no guarding and no CVA tenderness. No hernia. Hernia confirmed negative in the ventral area. Musculoskeletal: Normal range of motion. Patient exhibits no edema or tenderness. Lymphadenopathy:        Head (right side): No submental, no submandibular, no preauricular, no posterior auricular and no occipital adenopathy present. Head (left side): No submental, no submandibular, no preauricular, no posterior auricular and no occipital adenopathy present. Patient  has no cervical adenopathy. Right: No supraclavicular adenopathy present. Left: No supraclavicular adenopathy present. Neurological: Patient is alert and oriented to person, place, and time. Patient has normal strength. Coordination and gait normal. GCS eye subscore is 4. GCS verbal subscore is 5. GCS motor subscore is 6. Skin: Skin is warm and dry. No abrasion and no rash noted. Patient  is not diaphoretic. No cyanosis or erythema. Psychiatric: Patient has a normal mood and affect. speech is normal and behavior is normal. Cognition and memory are normal.             A/P  Adelaida Torres is a very pleasant 69 y.o. male with Obesity,  Body mass index is 36.03 kg/m². and multiple obesity related co-morbidities.  Adelaida Torres is very motivated to lose weight and being more healthy. We discussed how his weight affects his overall health including:  Milton Closs was seen today for weight management. Diagnoses and all orders for this visit:    Severe obesity (BMI 35.0-39. 9) with comorbidity (HCC)  -     CBC Auto Differential; Future  -     Comprehensive Metabolic Panel; Future  -     Hemoglobin A1C; Future  -     Iron and TIBC; Future  -     Lipid Panel; Future  -     TSH with Reflex; Future  -     Vitamin B12 & Folate; Future  -     Vitamin D 25 Hydroxy; Future  -     Brigette Ramos MD, Cardiology, Shasta Ratliff [facility], [facility]    Essential hypertension  -     CBC Auto Differential; Future  -     Comprehensive Metabolic Panel; Future  -     Hemoglobin A1C; Future  -     Iron and TIBC; Future  -     Lipid Panel; Future  -     TSH with Reflex; Future  -     Vitamin B12 & Folate; Future  -     Vitamin D 25 Hydroxy; Future  -     Brigette Ramos MD, Cardiology, Shasta Ratliff [facility], [facility]    Primary osteoarthritis of left knee      The patient underwent 30 minutes of dietary counseling. I have reviewed, discussed and agree with the dietary plan. Medical weight loss and different surgical options were discussed in details with patient. Teena Alanis is interested in surgical weight loss. Patient is interested in Laparoscopic Sleeve Gastrectomy, which I believe is an excellent option for the patient. We will proceed with pre-operative work up labs and studies. Will also petition patient's  insurance for approval for this procedure. Patient received dietary handouts and education. Patient advised that its their responsibility to follow up for studies and/or labs ordered today.    Also discussed in details the importance of follow up, as well following the recommendations and completing the whole program to improve outcomes when it comes to healthier lifestyle as well weight loss. Patient also advised about risks and benefits being on a strict dietary regimen as well using supplements. Patient agrees and wants to proceed with weight loss planning     Labs ordered. Cardiac Clearance. Pulmonary Clearance. Psych Evaluation. H-pylori   EGD  Support Group. PCP Letter. Weight History    F/U in 4 weeks. Patient advised that its their responsibility to follow up for studies and/or labs ordered today.

## 2021-05-05 NOTE — PROGRESS NOTES
term food changes. Patient is deemed nutritionally appropriate to proceed. Goals  Weight: 225#  Health Improvement: Increased energy - physical and mental    Assessment  Nutritional Needs: RMR=(9.99 x 137.8) + (6.25 x 195.6) - (4.92 x 70 y.o.) +5= 2255 kcal x 1.4 (sedentary activity factor)= 3157 kcal - 1000 (for 2 lb weight loss/week)= 2157 kcal.    Plan  Plan/Recommendations: General weight loss/lifestyle modification strategies discussed (elicit support from others; identify saboteurs; non-food rewards, etc). Goals:   -Eat 4-5 times daily  -Avoid high fat and high sugar foods  -Include protein with all meals and snacks  -Avoid carbonation and caffeine  -Avoid calorie containing beverages  -Increase physical activity as tolerated    PES Statement:  Overweight/Obesity related to lack of exercise, sedentary lifestyle, unhealthy eating habits, and unsuccessful diet attempts as evidenced by BMI. Body mass index is 36.03 kg/m². .    Will follow up as necessary.     Oksana Score

## 2021-05-13 ENCOUNTER — OFFICE VISIT (OUTPATIENT)
Dept: INTERNAL MEDICINE CLINIC | Age: 71
End: 2021-05-13
Payer: MEDICARE

## 2021-05-13 VITALS
HEART RATE: 68 BPM | DIASTOLIC BLOOD PRESSURE: 104 MMHG | BODY MASS INDEX: 35.42 KG/M2 | OXYGEN SATURATION: 99 % | SYSTOLIC BLOOD PRESSURE: 168 MMHG | HEIGHT: 77 IN | WEIGHT: 300 LBS

## 2021-05-13 DIAGNOSIS — E66.09 CLASS 2 OBESITY DUE TO EXCESS CALORIES WITH BODY MASS INDEX (BMI) OF 35.0 TO 35.9 IN ADULT, UNSPECIFIED WHETHER SERIOUS COMORBIDITY PRESENT: ICD-10-CM

## 2021-05-13 DIAGNOSIS — Z12.11 SCREENING FOR COLON CANCER: ICD-10-CM

## 2021-05-13 DIAGNOSIS — I10 ESSENTIAL HYPERTENSION: Primary | ICD-10-CM

## 2021-05-13 PROCEDURE — G8417 CALC BMI ABV UP PARAM F/U: HCPCS | Performed by: INTERNAL MEDICINE

## 2021-05-13 PROCEDURE — 3017F COLORECTAL CA SCREEN DOC REV: CPT | Performed by: INTERNAL MEDICINE

## 2021-05-13 PROCEDURE — G8427 DOCREV CUR MEDS BY ELIG CLIN: HCPCS | Performed by: INTERNAL MEDICINE

## 2021-05-13 PROCEDURE — 1036F TOBACCO NON-USER: CPT | Performed by: INTERNAL MEDICINE

## 2021-05-13 PROCEDURE — 1123F ACP DISCUSS/DSCN MKR DOCD: CPT | Performed by: INTERNAL MEDICINE

## 2021-05-13 PROCEDURE — 4040F PNEUMOC VAC/ADMIN/RCVD: CPT | Performed by: INTERNAL MEDICINE

## 2021-05-13 PROCEDURE — 99214 OFFICE O/P EST MOD 30 MIN: CPT | Performed by: INTERNAL MEDICINE

## 2021-05-13 RX ORDER — BUDESONIDE AND FORMOTEROL FUMARATE DIHYDRATE 80; 4.5 UG/1; UG/1
2 AEROSOL RESPIRATORY (INHALATION) 2 TIMES DAILY PRN
Qty: 1 INHALER | Refills: 3 | Status: SHIPPED | OUTPATIENT
Start: 2021-05-13 | End: 2021-05-17 | Stop reason: SDUPTHER

## 2021-05-13 RX ORDER — ATENOLOL AND CHLORTHALIDONE TABLET 100; 25 MG/1; MG/1
1 TABLET ORAL DAILY
Qty: 90 TABLET | Refills: 3 | Status: SHIPPED | OUTPATIENT
Start: 2021-05-13 | End: 2021-05-17 | Stop reason: SDUPTHER

## 2021-05-13 SDOH — ECONOMIC STABILITY: FOOD INSECURITY: WITHIN THE PAST 12 MONTHS, THE FOOD YOU BOUGHT JUST DIDN'T LAST AND YOU DIDN'T HAVE MONEY TO GET MORE.: NEVER TRUE

## 2021-05-13 SDOH — ECONOMIC STABILITY: TRANSPORTATION INSECURITY
IN THE PAST 12 MONTHS, HAS LACK OF TRANSPORTATION KEPT YOU FROM MEETINGS, WORK, OR FROM GETTING THINGS NEEDED FOR DAILY LIVING?: NO

## 2021-05-13 SDOH — ECONOMIC STABILITY: TRANSPORTATION INSECURITY
IN THE PAST 12 MONTHS, HAS THE LACK OF TRANSPORTATION KEPT YOU FROM MEDICAL APPOINTMENTS OR FROM GETTING MEDICATIONS?: NO

## 2021-05-13 ASSESSMENT — PATIENT HEALTH QUESTIONNAIRE - PHQ9
2. FEELING DOWN, DEPRESSED OR HOPELESS: 0
SUM OF ALL RESPONSES TO PHQ QUESTIONS 1-9: 0
SUM OF ALL RESPONSES TO PHQ9 QUESTIONS 1 & 2: 0

## 2021-05-13 NOTE — PROGRESS NOTES
CHIEF COMPLAINT: Bryan Henderson is a 69 y.o. male who presents for : Follow-up hypertension obesity    HPI: Patient presented with all of his blood pressures blood pressure at home has been running around 1 60-1 70 in addition he notes occasional wheezing at night and notes that he is being evaluated for bariatric surgery he has the following comorbid conditions probable sleep apnea blood pressure under poor control unsteady gait secondary to peripheral neuropathy and partial amputations of a foot bilateral    Review of Systems:   Constitutional:  Denies fever or chills   Eyes:  Denies change in visual acuity   HENT:  Denies nasal congestion or sore throat   Respiratory:  Denies cough or shortness of breath   Cardiovascular:  Denies chest pain or edema   GI:  Denies abdominal pain, nausea, vomiting, bloody stools or diarrhea   :  Denies dysuria   Musculoskeletal:  Denies back pain or joint pain   Integument:  Denies rash   Neurologic:  Denies headache, focal weakness or sensory changes   Endocrine:  Denies polyuria or polydipsia   Lymphatic:  Denies swollen glands   Psychiatric:  Denies depression or anxiety     Past Medical History:        Diagnosis Date    Chest pain 1/2/2015    Chronic GERD     ESBL (extended spectrum beta-lactamase) producing bacteria infection 09/11/2016    E.coli ESBL-8/2015    Hepatitis C virus 1/2/2015    Hx of hepatitis C 1/16/2015    Hyperlipidemia     Hypertension     Hypertension     Hypertension, essential     MDRO (multiple drug resistant organisms) resistance 09/10/2016    Pseudomonas MDRO in foot 6/23/16    MDRO (multiple drug resistant organisms) resistance 9/22/17; 09/08/2017    Pseudomonas MDR in wound    Morbid obesity ([address]     Non-pressure chronic ulcer of other part of left foot limited to breakdown of skin ([address]     Non-pressure chronic ulcer of other part of right foot limited to breakdown of skin ([address]     Obstructive sleep apnea     Peripheral neuropathy 2015    Pre-operative clearance        Past Surgical History:        Procedure Laterality Date    FOOT SURGERY Bilateral 10/8/2015    left foot bone biopsy X 2, bilat wound debridement, bilat. amnio graft    FOOT SURGERY Right 2016    TRANSMETATARSAL AMPUTATION RIGHT FOOT;    FOOT SURGERY Left 2017    INCISION AND DRAINAGE WITH EXCISION OF ALL NECROTIC SOFT TISSUE AND BONE, LEFT FOOT WITH APPLICATION OF WOUND VAC    OTHER SURGICAL HISTORY  2016    PARTIAL FIRST RAY TOE AMPUTATION - RIGHT FOOT (VANCOMYCIN    OTHER SURGICAL HISTORY Left 2017     TRANSMETATARSAL AMPUTATION LEFT FOOT ;    OTHER SURGICAL HISTORY Left 2017    INCISION AND DRAINAGE OF NECROTIC TISSUE AND BONE WITH RE-    OTHER SURGICAL HISTORY Left 2017    RESECTION FIRST METATARSAL WITH PARTIAL RESECTION SECOND,    TONSILLECTOMY         Family History:  No family history on file. Social History:  Social History     Socioeconomic History    Marital status:       Spouse name: None    Number of children: None    Years of education: None    Highest education level: None   Occupational History    None   Social Needs    Financial resource strain: Not hard at all   [employer] insecurity     Worry: Never true     Inability: Never true   [employer] needs     Medical: No     Non-medical: No   Tobacco Use    Smoking status: Former Smoker     Packs/day: 0.25     Years: 5.00     Pack years: 1.25     Quit date: 2016     Years since quittin.6    Smokeless tobacco: Never Used   Substance and Sexual Activity    Alcohol use: Yes     Comment: at times    Drug use: No    Sexual activity: Yes     Partners: Female   Lifestyle    Physical activity     Days per week: None     Minutes per session: None    Stress: None   Relationships    Social connections     Talks on phone: None     Gets together: None     Attends Rastafari service: None     Active member of club or organization: None     Attends meetings of clubs or organizations: None     Relationship status: None    Intimate partner violence     Fear of current or ex partner: None     Emotionally abused: None     Physically abused: None     Forced sexual activity: None   Other Topics Concern    None   Social History Narrative    None         Allergies:  Clindamycin/lincomycin    Current Medications:    Prior to Admission medications    Medication Sig Start Date End Date Taking? Authorizing Provider   atenolol-chlorthalidone (TENORETIC 100) 100-25 MG per tablet Take 1 tablet by mouth daily 5/13/21  Yes Ky Barber MD   budesonide-formoterol [facility]) 80-4.5 MCG/ACT AERO Inhale 2 puffs into the lungs 2 times daily as needed (SOB) 5/13/21  Yes Ky Barber MD   Esomeprazole Magnesium 20 MG TBEC    Yes Historical Provider, MD   mirtazapine (REMERON) 7.5 MG tablet Take 1 tablet by mouth nightly 2/11/21  Yes Ky Barber MD   sildenafil (VIAGRA) 100 MG tablet Take one tablet by mouth as needed 1 hour prior to sexual activity.  12/6/19  Yes Ky Barber MD   b complex vitamins capsule Take 1 capsule by mouth daily   Yes Historical Provider, MD   Multiple Vitamins-Minerals (SENTRY SENIOR) TABS Take 1 tablet by mouth daily   Yes Historical Provider, MD   Omega 3-6-9 Fatty Acids (OMEGA 3-6-9 COMPLEX) CAPS Take 1 tablet by mouth daily   Yes Historical Provider, MD   Potassium Gluconate 595 (99 K) MG TABS Take 1 tablet by mouth daily   Yes Historical Provider, MD   zinc gluconate 50 MG tablet Take 50 mg by mouth daily   Yes Historical Provider, MD   acetaminophen (TYLENOL) 325 MG tablet Take 650 mg by mouth every 6 hours as needed for Pain   Yes Historical Provider, MD       Physical Exam:  Vital Signs: BP (!) 168/104   Pulse 68   Ht 6' 5\" (1.956 m)   Wt 300 lb (136.1 kg)   SpO2 99%   BMI 35.57 kg/m²   General: Patient appears  non-toxic  HENT: Atraumatic, normocephalic, oral mucosa moist  Lungs:  Clear bilaterally  Heart: Regular rate and rhythm  Abdomen: Non-distended, soft, non-tender  Extremities: No edema  Neuro: Nonfocal    Medical Decision Making and Plan:  Pertinent Labs & Imaging studies reviewed.  (See chart for details)      Hypertension now this is elevated will change Tenormin to Tenoretic  Intermittent wheezing we will try Symbicort as needed  Obesity letter sent with regard to comorbid conditions for bariatric surgery evaluation

## 2021-05-17 ENCOUNTER — OFFICE VISIT (OUTPATIENT)
Dept: PULMONOLOGY | Age: 71
End: 2021-05-17
Payer: MEDICARE

## 2021-05-17 VITALS
TEMPERATURE: 98.6 F | HEART RATE: 51 BPM | DIASTOLIC BLOOD PRESSURE: 88 MMHG | OXYGEN SATURATION: 98 % | HEIGHT: 76 IN | BODY MASS INDEX: 37.26 KG/M2 | SYSTOLIC BLOOD PRESSURE: 142 MMHG | WEIGHT: 306 LBS

## 2021-05-17 DIAGNOSIS — Z01.811 PREOP PULMONARY/RESPIRATORY EXAM: ICD-10-CM

## 2021-05-17 DIAGNOSIS — E66.01 CLASS 2 SEVERE OBESITY DUE TO EXCESS CALORIES WITH SERIOUS COMORBIDITY AND BODY MASS INDEX (BMI) OF 35.0 TO 35.9 IN ADULT (HCC): Primary | ICD-10-CM

## 2021-05-17 PROCEDURE — 99203 OFFICE O/P NEW LOW 30 MIN: CPT | Performed by: INTERNAL MEDICINE

## 2021-05-17 PROCEDURE — G8417 CALC BMI ABV UP PARAM F/U: HCPCS | Performed by: INTERNAL MEDICINE

## 2021-05-17 PROCEDURE — G8427 DOCREV CUR MEDS BY ELIG CLIN: HCPCS | Performed by: INTERNAL MEDICINE

## 2021-05-17 PROCEDURE — 1036F TOBACCO NON-USER: CPT | Performed by: INTERNAL MEDICINE

## 2021-05-17 PROCEDURE — 4040F PNEUMOC VAC/ADMIN/RCVD: CPT | Performed by: INTERNAL MEDICINE

## 2021-05-17 PROCEDURE — 1123F ACP DISCUSS/DSCN MKR DOCD: CPT | Performed by: INTERNAL MEDICINE

## 2021-05-17 PROCEDURE — 3017F COLORECTAL CA SCREEN DOC REV: CPT | Performed by: INTERNAL MEDICINE

## 2021-05-17 RX ORDER — MIRTAZAPINE 7.5 MG/1
7.5 TABLET, FILM COATED ORAL NIGHTLY
Qty: 90 TABLET | Refills: 1 | Status: SHIPPED | OUTPATIENT
Start: 2021-05-17 | End: 2021-09-29 | Stop reason: CLARIF

## 2021-05-17 RX ORDER — ATENOLOL AND CHLORTHALIDONE TABLET 100; 25 MG/1; MG/1
1 TABLET ORAL DAILY
Qty: 90 TABLET | Refills: 3 | Status: ON HOLD | OUTPATIENT
Start: 2021-05-17 | End: 2021-10-06 | Stop reason: HOSPADM

## 2021-05-17 RX ORDER — BUDESONIDE AND FORMOTEROL FUMARATE DIHYDRATE 80; 4.5 UG/1; UG/1
2 AEROSOL RESPIRATORY (INHALATION) 2 TIMES DAILY PRN
Qty: 3 INHALER | Refills: 3 | Status: SHIPPED | OUTPATIENT
Start: 2021-05-17 | End: 2021-09-29 | Stop reason: CLARIF

## 2021-05-17 ASSESSMENT — SLEEP AND FATIGUE QUESTIONNAIRES
ESS TOTAL SCORE: 0
HOW LIKELY ARE YOU TO NOD OFF OR FALL ASLEEP WHILE SITTING INACTIVE IN A PUBLIC PLACE: 0
HOW LIKELY ARE YOU TO NOD OFF OR FALL ASLEEP WHILE SITTING QUIETLY AFTER LUNCH WITHOUT ALCOHOL: 0
HOW LIKELY ARE YOU TO NOD OFF OR FALL ASLEEP WHILE LYING DOWN TO REST IN THE AFTERNOON WHEN CIRCUMSTANCES PERMIT: 0
HOW LIKELY ARE YOU TO NOD OFF OR FALL ASLEEP IN A CAR, WHILE STOPPED FOR A FEW MINUTES IN TRAFFIC: 0
HOW LIKELY ARE YOU TO NOD OFF OR FALL ASLEEP WHEN YOU ARE A PASSENGER IN A CAR FOR AN HOUR WITHOUT A BREAK: 0
HOW LIKELY ARE YOU TO NOD OFF OR FALL ASLEEP WHILE WATCHING TV: 0
HOW LIKELY ARE YOU TO NOD OFF OR FALL ASLEEP WHILE SITTING AND READING: 0

## 2021-05-17 NOTE — PROGRESS NOTES
[facility] Pulmonary and Critical Care    Outpatient Initial Note    Subjective:   CHIEF COMPLAINT / HPI:     The patient is 69 y.o. male who presents today for a new patient visit for preoperative pulmonary evaluation for bariatric surgery. Kristian Dumont has no history of chronic lung disease nor has he seen a pulmonologist before. His primary care physician, Dr. Riley Carrero, recently prescribed Symbicort for a subtle wheeze that patient states he hears more when it is quiet at night. He denies a history of asthma and denies any cough, dyspnea on exertion, chest tightness or reflux. He states that he gets a good night's sleep and awakens feeling refreshed. He he denies any snoring or witnessed apnea. He has never had a sleep study. Past Medical History:    Past Medical History:   Diagnosis Date    Chest pain 1/2/2015    Chronic GERD     ESBL (extended spectrum beta-lactamase) producing bacteria infection 09/11/2016    E.coli ESBL-8/2015    Hepatitis C virus 1/2/2015    Hx of hepatitis C 1/16/2015    Hyperlipidemia     Hypertension     Hypertension     Hypertension, essential     MDRO (multiple drug resistant organisms) resistance 09/10/2016    Pseudomonas MDRO in foot 6/23/16    MDRO (multiple drug resistant organisms) resistance 9/22/17; 09/08/2017    Pseudomonas MDR in wound    Morbid obesity ([address]     Non-pressure chronic ulcer of other part of left foot limited to breakdown of skin ([address]     Non-pressure chronic ulcer of other part of right foot limited to breakdown of skin ([address]     Obstructive sleep apnea     Peripheral neuropathy 1/2/2015    Pre-operative clearance        Social History:    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   He smoked less than half a pack of cigarettes a day for 40 years and quit in 2018    Family History:  Kidney disease    Current Medications:  Current Outpatient Medications on File Prior to Visit   Medication Sig Dispense Refill    atenolol-chlorthalidone (TENORETIC 100) 100-25 MG per tablet Take 1 tablet by mouth daily 90 tablet 3    budesonide-formoterol (SYMBICORT) 80-4.5 MCG/ACT AERO Inhale 2 puffs into the lungs 2 times daily as needed (SOB) 1 Inhaler 3    Esomeprazole Magnesium 20 MG TBEC       mirtazapine (REMERON) 7.5 MG tablet Take 1 tablet by mouth nightly 90 tablet 1    sildenafil (VIAGRA) 100 MG tablet Take one tablet by mouth as needed 1 hour prior to sexual activity. 6 tablet 3    b complex vitamins capsule Take 1 capsule by mouth daily      Multiple Vitamins-Minerals (SENTRY SENIOR) TABS Take 1 tablet by mouth daily      Omega 3-6-9 Fatty Acids (OMEGA 3-6-9 COMPLEX) CAPS Take 1 tablet by mouth daily      Potassium Gluconate 595 (99 K) MG TABS Take 1 tablet by mouth daily      zinc gluconate 50 MG tablet Take 50 mg by mouth daily      acetaminophen (TYLENOL) 325 MG tablet Take 650 mg by mouth every 6 hours as needed for Pain       No current facility-administered medications on file prior to visit.      REVIEW OF SYSTEMS:    CONSTITUTIONAL: Negative for fevers and chills  HEENT: Negative for oropharyngeal exudate, post nasal drip, sinus pain / pressure, nasal congestion, ear pain  RESPIRATORY:  See HPI  CARDIOVASCULAR: Negative for chest pain, palpitations, edema  GASTROINTESTINAL: Negative for nausea, vomiting, diarrhea, constipation and abdominal pain  GENITOURINARY: Negative for dysuria, urinary frequency, urinary hesitancy  HEMATOLOGICAL: Negative for adenopathy  SKIN: Negative for clubbing, cyanosis, skin lesions  ENDOCRINE: Negative for polyuria, polydipsia, heat intolerance, cold intolerance   EXTREMITIES: Negative for weakness or decreased ROM in all extremities  NEUROLOGICAL: Negative for unilateral weakness, speech or gait abnormalities    Objective:   PHYSICAL EXAM:        VITALS:  BP (!) 142/88 (Site: Right Upper Arm, Position: Sitting, Cuff Size: Medium Adult)   Pulse 51   Temp 98.6 °F (37 °C) (Infrared)

## 2021-05-17 NOTE — TELEPHONE ENCOUNTER
David Irwin \"Dominick Aguayo\"  Keith Ramon MD 1 hour ago (11:20 AM)   TP  The prescriptions Dr Jared Thomas sent to [facility] last week are not being filled. [facility] emailed me stating that that they called your office for prescription clarification but have not been able to reach anyone yet to assist them. These meds are needed asap. [facility] [place] asks that you phone their pharmacy in order for them to release the meds.  Please phone the [facility] at [phone]

## 2021-05-20 ENCOUNTER — OFFICE VISIT (OUTPATIENT)
Dept: CARDIOLOGY CLINIC | Age: 71
End: 2021-05-20
Payer: MEDICARE

## 2021-05-20 VITALS
SYSTOLIC BLOOD PRESSURE: 138 MMHG | HEART RATE: 60 BPM | WEIGHT: 311.2 LBS | DIASTOLIC BLOOD PRESSURE: 90 MMHG | BODY MASS INDEX: 37.88 KG/M2

## 2021-05-20 DIAGNOSIS — Z01.818 PRE-OP EVALUATION: Primary | ICD-10-CM

## 2021-05-20 DIAGNOSIS — I44.4 LAFB (LEFT ANTERIOR FASCICULAR BLOCK): ICD-10-CM

## 2021-05-20 DIAGNOSIS — R94.31 ABNORMAL EKG: ICD-10-CM

## 2021-05-20 PROCEDURE — 1036F TOBACCO NON-USER: CPT | Performed by: INTERNAL MEDICINE

## 2021-05-20 PROCEDURE — 4040F PNEUMOC VAC/ADMIN/RCVD: CPT | Performed by: INTERNAL MEDICINE

## 2021-05-20 PROCEDURE — 1123F ACP DISCUSS/DSCN MKR DOCD: CPT | Performed by: INTERNAL MEDICINE

## 2021-05-20 PROCEDURE — 93000 ELECTROCARDIOGRAM COMPLETE: CPT | Performed by: INTERNAL MEDICINE

## 2021-05-20 PROCEDURE — 3017F COLORECTAL CA SCREEN DOC REV: CPT | Performed by: INTERNAL MEDICINE

## 2021-05-20 PROCEDURE — 99204 OFFICE O/P NEW MOD 45 MIN: CPT | Performed by: INTERNAL MEDICINE

## 2021-05-20 PROCEDURE — G8427 DOCREV CUR MEDS BY ELIG CLIN: HCPCS | Performed by: INTERNAL MEDICINE

## 2021-05-20 PROCEDURE — G8417 CALC BMI ABV UP PARAM F/U: HCPCS | Performed by: INTERNAL MEDICINE

## 2021-05-20 ASSESSMENT — ENCOUNTER SYMPTOMS
CHEST TIGHTNESS: 0
ABDOMINAL DISTENTION: 0
COUGH: 0
CHOKING: 0
SHORTNESS OF BREATH: 0
APNEA: 0

## 2021-05-20 NOTE — PROGRESS NOTES
Subjective:      Patient ID: Sophie Early is a 69 y.o. male. HPI  Referred pre op bariatric surg. Abn ekg/LAFB/HTN. Normal activities. occ  Exercise. Denies exertional chest pain/sob. No pnd or orthopnea. No palp/tachy/syncope. No edema. No prior cardiac hx. Past Medical History:   Diagnosis Date    Chest pain 1/2/2015    Chronic GERD     ESBL (extended spectrum beta-lactamase) producing bacteria infection 09/11/2016    E.coli ESBL-8/2015    Hepatitis C virus 1/2/2015    Hx of hepatitis C 1/16/2015    Hyperlipidemia     Hypertension     Hypertension     Hypertension, essential     MDRO (multiple drug resistant organisms) resistance 09/10/2016    Pseudomonas MDRO in foot 6/23/16    MDRO (multiple drug resistant organisms) resistance 9/22/17; 09/08/2017    Pseudomonas MDR in wound    Morbid obesity ([address]     Non-pressure chronic ulcer of other part of left foot limited to breakdown of skin ([address]     Non-pressure chronic ulcer of other part of right foot limited to breakdown of skin ([address]     Obstructive sleep apnea     Peripheral neuropathy 1/2/2015    Pre-operative clearance      Past Surgical History:   Procedure Laterality Date    FOOT SURGERY Bilateral 10/8/2015    left foot bone biopsy X 2, bilat wound debridement, bilat.  amnio graft    FOOT SURGERY Right 09/13/2016    TRANSMETATARSAL AMPUTATION RIGHT FOOT;    FOOT SURGERY Left 09/25/2017    INCISION AND DRAINAGE WITH EXCISION OF ALL NECROTIC SOFT TISSUE AND BONE, LEFT FOOT WITH APPLICATION OF WOUND VAC    OTHER SURGICAL HISTORY  09/11/2016    PARTIAL FIRST RAY TOE AMPUTATION - RIGHT FOOT (VANCOMYCIN    OTHER SURGICAL HISTORY Left 09/08/2017     TRANSMETATARSAL AMPUTATION LEFT FOOT ;    OTHER SURGICAL HISTORY Left 09/27/2017    INCISION AND DRAINAGE OF NECROTIC TISSUE AND BONE WITH RE-    OTHER SURGICAL HISTORY Left 09/30/2017    RESECTION FIRST METATARSAL WITH PARTIAL RESECTION SECOND,    TONSILLECTOMY       Social History     Socioeconomic History    Marital status:      Spouse name: Not on file    Number of children: Not on file    Years of education: Not on file    Highest education level: Not on file   Occupational History    Not on file   Tobacco Use    Smoking status: Former Smoker     Packs/day: 0.25     Years: 5.00     Pack years: 1.25     Quit date: 2016     Years since quittin.6    Smokeless tobacco: Never Used   Substance and Sexual Activity    Alcohol use: Yes     Comment: at times    Drug use: No    Sexual activity: Yes     Partners: Female   Other Topics Concern    Not on file   Social History Narrative    Not on file     Social Determinants of Health     Financial Resource Strain: Low Risk     Difficulty of Paying Living Expenses: Not hard at all   Food Insecurity: No Food Insecurity    Worried About [address] [place] [address] [employer] in the Last Year: Never true    Juliann [employer] in the Last Year: Never true   Transportation Needs: No Transportation Needs    Lack of Transportation (Medical): No    Lack of Transportation (Non-Medical): No   Physical Activity:     Days of Exercise per Week:     Minutes of Exercise per Session:    Stress:     Feeling of Stress :    Social Connections:     Frequency of Communication with Friends and Family:     Frequency of Social Gatherings with Friends and Family:     Attends [employer] Services:     Active Member of Clubs or Organizations:     Attends Club or Organization Meetings:     Marital Status:    Intimate Partner Violence:     Fear of Current or Ex-Partner:     Emotionally Abused:     Physically Abused:     Sexually Abused:      FH reviewed, denies FH cardiac issues. Vitals:    21 0922   BP: (!) 138/90   Pulse: 60     Wt 311    Review of Systems   Constitutional: Negative for activity change and fatigue. Respiratory: Negative for apnea, cough, choking, chest tightness and shortness of breath.     Cardiovascular: Negative for chest

## 2021-05-28 DIAGNOSIS — E66.01 SEVERE OBESITY (BMI 35.0-39.9) WITH COMORBIDITY (HCC): ICD-10-CM

## 2021-05-28 DIAGNOSIS — I10 ESSENTIAL HYPERTENSION: ICD-10-CM

## 2021-05-28 LAB
BASOPHILS ABSOLUTE: 0.1 K/UL (ref 0–0.2)
BASOPHILS RELATIVE PERCENT: 0.8 %
EOSINOPHILS ABSOLUTE: 0.3 K/UL (ref 0–0.6)
EOSINOPHILS RELATIVE PERCENT: 4.5 %
HCT VFR BLD CALC: 51.9 % (ref 40.5–52.5)
HEMOGLOBIN: 17.7 G/DL (ref 13.5–17.5)
LYMPHOCYTES ABSOLUTE: 1.7 K/UL (ref 1–5.1)
LYMPHOCYTES RELATIVE PERCENT: 22.8 %
MCH RBC QN AUTO: 31.9 PG (ref 26–34)
MCHC RBC AUTO-ENTMCNC: 34 G/DL (ref 31–36)
MCV RBC AUTO: 93.8 FL (ref 80–100)
MONOCYTES ABSOLUTE: 0.8 K/UL (ref 0–1.3)
MONOCYTES RELATIVE PERCENT: 10.4 %
NEUTROPHILS ABSOLUTE: 4.6 K/UL (ref 1.7–7.7)
NEUTROPHILS RELATIVE PERCENT: 61.5 %
PDW BLD-RTO: 14.3 % (ref 12.4–15.4)
PLATELET # BLD: 140 K/UL (ref 135–450)
PMV BLD AUTO: 9.4 FL (ref 5–10.5)
RBC # BLD: 5.54 M/UL (ref 4.2–5.9)
WBC # BLD: 7.5 K/UL (ref 4–11)

## 2021-05-29 LAB
A/G RATIO: 1.7 (ref 1.1–2.2)
ALBUMIN SERPL-MCNC: 4.7 G/DL (ref 3.4–5)
ALP BLD-CCNC: 73 U/L (ref 40–129)
ALT SERPL-CCNC: 71 U/L (ref 10–40)
ANION GAP SERPL CALCULATED.3IONS-SCNC: 22 MMOL/L (ref 3–16)
AST SERPL-CCNC: 52 U/L (ref 15–37)
BILIRUB SERPL-MCNC: 0.6 MG/DL (ref 0–1)
BUN BLDV-MCNC: 27 MG/DL (ref 7–20)
CALCIUM SERPL-MCNC: 9.4 MG/DL (ref 8.3–10.6)
CHLORIDE BLD-SCNC: 101 MMOL/L (ref 99–110)
CHOLESTEROL, TOTAL: 219 MG/DL (ref 0–199)
CO2: 16 MMOL/L (ref 21–32)
CREAT SERPL-MCNC: 1.5 MG/DL (ref 0.8–1.3)
ESTIMATED AVERAGE GLUCOSE: 134.1 MG/DL
FOLATE: >20 NG/ML (ref 4.78–24.2)
GFR AFRICAN AMERICAN: 56
GFR NON-AFRICAN AMERICAN: 46
GLOBULIN: 2.7 G/DL
GLUCOSE BLD-MCNC: 136 MG/DL (ref 70–99)
HBA1C MFR BLD: 6.3 %
HDLC SERPL-MCNC: 36 MG/DL (ref 40–60)
IRON SATURATION: 41 % (ref 20–50)
IRON: 140 UG/DL (ref 59–158)
LDL CHOLESTEROL CALCULATED: ABNORMAL MG/DL
LDL CHOLESTEROL DIRECT: 125 MG/DL
POTASSIUM SERPL-SCNC: 4.8 MMOL/L (ref 3.5–5.1)
SODIUM BLD-SCNC: 139 MMOL/L (ref 136–145)
TOTAL IRON BINDING CAPACITY: 343 UG/DL (ref 260–445)
TOTAL PROTEIN: 7.4 G/DL (ref 6.4–8.2)
TRIGL SERPL-MCNC: 422 MG/DL (ref 0–150)
TSH REFLEX: 3.28 UIU/ML (ref 0.27–4.2)
VITAMIN B-12: 634 PG/ML (ref 211–911)
VITAMIN D 25-HYDROXY: 30.5 NG/ML
VLDLC SERPL CALC-MCNC: ABNORMAL MG/DL

## 2021-06-02 ENCOUNTER — OFFICE VISIT (OUTPATIENT)
Dept: BARIATRICS/WEIGHT MGMT | Age: 71
End: 2021-06-02
Payer: MEDICARE

## 2021-06-02 VITALS — WEIGHT: 303.8 LBS | BODY MASS INDEX: 36.98 KG/M2

## 2021-06-02 DIAGNOSIS — E66.01 SEVERE OBESITY (BMI 35.0-39.9) WITH COMORBIDITY (HCC): Primary | ICD-10-CM

## 2021-06-02 DIAGNOSIS — I10 ESSENTIAL HYPERTENSION: ICD-10-CM

## 2021-06-02 DIAGNOSIS — N18.9 CHRONIC KIDNEY DISEASE, UNSPECIFIED CKD STAGE: ICD-10-CM

## 2021-06-02 PROCEDURE — 4040F PNEUMOC VAC/ADMIN/RCVD: CPT | Performed by: SURGERY

## 2021-06-02 PROCEDURE — 1036F TOBACCO NON-USER: CPT | Performed by: SURGERY

## 2021-06-02 PROCEDURE — 99213 OFFICE O/P EST LOW 20 MIN: CPT | Performed by: SURGERY

## 2021-06-02 PROCEDURE — 3017F COLORECTAL CA SCREEN DOC REV: CPT | Performed by: SURGERY

## 2021-06-02 PROCEDURE — 1123F ACP DISCUSS/DSCN MKR DOCD: CPT | Performed by: SURGERY

## 2021-06-02 PROCEDURE — G8417 CALC BMI ABV UP PARAM F/U: HCPCS | Performed by: SURGERY

## 2021-06-02 PROCEDURE — G8427 DOCREV CUR MEDS BY ELIG CLIN: HCPCS | Performed by: SURGERY

## 2021-06-02 NOTE — PROGRESS NOTES
[facility]) Physicians   General & Laparoscopic Surgery  [employer]       HPI:     Dominique Barbosa is a very pleasant 69 y.o. male with Body mass index is 36.98 kg/m². , Pre-Surgery. Pre-operative clearance and work up pending. Working hard to keep good dietary habits as well level of activity. Patient denies any nausea, vomiting, fevers, chills, shortness of breath, chest pain, cough, constipation or difficulty urinating. Past Medical History:   Diagnosis Date    Chest pain 1/2/2015    Chronic GERD     ESBL (extended spectrum beta-lactamase) producing bacteria infection 09/11/2016    E.coli ESBL-8/2015    Hepatitis C virus 1/2/2015    Hx of hepatitis C 1/16/2015    Hyperlipidemia     Hypertension     Hypertension     Hypertension, essential     MDRO (multiple drug resistant organisms) resistance 09/10/2016    Pseudomonas MDRO in foot 6/23/16    MDRO (multiple drug resistant organisms) resistance 9/22/17; 09/08/2017    Pseudomonas MDR in wound    Morbid obesity ([facility])     Non-pressure chronic ulcer of other part of left foot limited to breakdown of skin ([facility])     Non-pressure chronic ulcer of other part of right foot limited to breakdown of skin ([facility])     Obstructive sleep apnea     Peripheral neuropathy 1/2/2015    Pre-operative clearance      Past Surgical History:   Procedure Laterality Date    FOOT SURGERY Bilateral 10/8/2015    left foot bone biopsy X 2, bilat wound debridement, bilat.  amnio graft    FOOT SURGERY Right 09/13/2016    TRANSMETATARSAL AMPUTATION RIGHT FOOT;    FOOT SURGERY Left 09/25/2017    INCISION AND DRAINAGE WITH EXCISION OF ALL NECROTIC SOFT TISSUE AND BONE, LEFT FOOT WITH APPLICATION OF WOUND VAC    OTHER SURGICAL HISTORY  09/11/2016    PARTIAL FIRST RAY TOE AMPUTATION - RIGHT FOOT (VANCOMYCIN    OTHER SURGICAL HISTORY Left 09/08/2017     TRANSMETATARSAL AMPUTATION LEFT FOOT ;    OTHER SURGICAL HISTORY Left 09/27/2017    INCISION AND DRAINAGE OF NECROTIC TISSUE AND BONE WITH RE-    OTHER SURGICAL HISTORY Left 2017    RESECTION FIRST METATARSAL WITH PARTIAL RESECTION SECOND,    TONSILLECTOMY       No family history on file. Social History     Tobacco Use    Smoking status: Former Smoker     Packs/day: 0.25     Years: 5.00     Pack years: 1.25     Quit date: 2016     Years since quittin.7    Smokeless tobacco: Never Used   Substance Use Topics    Alcohol use: Yes     Comment: at times     I counseled the patient on the importance of not smoking and risks of ETOH. Allergies   Allergen Reactions    Clindamycin/Lincomycin Rash     Vitals:    21 1304   Weight: (!) 303 lb 12.8 oz (137.8 kg)       Body mass index is 36.98 kg/m². Current Outpatient Medications:     budesonide-formoterol (SYMBICORT) 80-4.5 MCG/ACT AERO, Inhale 2 puffs into the lungs 2 times daily as needed (SOB), Disp: 3 Inhaler, Rfl: 3    atenolol-chlorthalidone (TENORETIC 100) 100-25 MG per tablet, Take 1 tablet by mouth daily, Disp: 90 tablet, Rfl: 3    mirtazapine (REMERON) 7.5 MG tablet, Take 1 tablet by mouth nightly, Disp: 90 tablet, Rfl: 1    Esomeprazole Magnesium 20 MG TBEC, , Disp: , Rfl:     sildenafil (VIAGRA) 100 MG tablet, Take one tablet by mouth as needed 1 hour prior to sexual activity. , Disp: 6 tablet, Rfl: 3    b complex vitamins capsule, Take 1 capsule by mouth daily, Disp: , Rfl:     Multiple Vitamins-Minerals (SENTRY SENIOR) TABS, Take 1 tablet by mouth daily, Disp: , Rfl:     Omega 3-6-9 Fatty Acids (OMEGA 3-6-9 COMPLEX) CAPS, Take 1 tablet by mouth daily, Disp: , Rfl:     Potassium Gluconate 595 (99 K) MG TABS, Take 1 tablet by mouth daily, Disp: , Rfl:     zinc gluconate 50 MG tablet, Take 50 mg by mouth daily, Disp: , Rfl:     acetaminophen (TYLENOL) 325 MG tablet, Take 650 mg by mouth every 6 hours as needed for Pain, Disp: , Rfl:       Review of Systems - History obtained from the patient  General ROS: negative Psychological ROS: negative  Endocrine ROS: negative  Respiratory ROS: negative  Cardiovascular ROS: negative  Gastrointestinal ROS:negative  Genito-Urinary ROS: negative  Musculoskeletal ROS: negative   Skin ROS: negative    Physical Exam   Vitals Reviewed   Constitutional: Patient is oriented to person, place, and time. Patient appears well-developed and well-nourished. Patient is active and cooperative. Non-toxic appearance. No distress. Neck: Trachea normal and normal range of motion. No JVD present. Pulmonary/Chest: Effort normal. No accessory muscle usage or stridor. No apnea. No respiratory distress. Cardiovascular: Normal rate and no JVD. Abdominal: Normal appearance. Patient exhibits no distension. Abdomen is soft, obese, non tender. Musculoskeletal: Normal range of motion. Patient exhibits no edema. Neurological: Patient is alert and oriented to person, place, and time. Patient has normal strength. GCS eye subscore is 4. GCS verbal subscore is 5. GCS motor subscore is 6. Skin: Skin is warm and dry. No abrasion and no rash noted. Patient is not diaphoretic. No cyanosis or erythema. Psychiatric: Patient has a normal mood and affect. Speech is normal and behavior is normal. Cognition and memory are normal.       Aline Bardales is 69 y.o. male, Body mass index is 36.98 kg/m². pre surgery, has stayed weight stable since last visit. The patient underwent dietary counseling with registered dietician. I have reviewed, discussed and agree with the dietary plan. Patient is trying hard to keep good dietary and behavior modifications. Patient is monitoring portion sizes, food choices and liquid calories. Patient is trying to exercise regularly as much as possible. We discussed how his weight affects his overall health including:  Ovidio Jones was seen today for obesity. Diagnoses and all orders for this visit:    Severe obesity (BMI 35.0-39. 9) with comorbidity ([address]    Chronic kidney disease, unspecified CKD stage  -     [facility]) - Zunilda Rojas MD, Nephrology, [facility]    Essential hypertension       and importance of weight loss to alleviate those co morbid conditions. I encouraged the patient to continue exercise and keeping healthy eating habits. Discussed pre-op labs and work up till now. Also counseled the patient extensively on Surgery. Total encounter time: 20 minutes including any number of the following: review of labs, imaging, provider notes, outside hospital records; performing examination/evaluation; counseling patient and family; ordering medications/tests; placing referrals and communication with referring physicians; coordination of care, and documentation in the EHR. RTC in 4 weeks  Obtain rest of pre-op work up / clearances  Diet and Exercise      Patient advised that its their responsibility to follow up for studies and/or labs ordered today.      Chris Wang

## 2021-06-15 ENCOUNTER — CLINICAL DOCUMENTATION (OUTPATIENT)
Dept: OTHER | Age: 71
End: 2021-06-15

## 2021-06-28 ENCOUNTER — HOSPITAL ENCOUNTER (OUTPATIENT)
Dept: NON INVASIVE DIAGNOSTICS | Age: 71
Discharge: HOME | End: 2021-06-28
Payer: MEDICARE

## 2021-06-28 DIAGNOSIS — R94.31 ABNORMAL EKG: ICD-10-CM

## 2021-06-28 DIAGNOSIS — Z01.818 PRE-OP EVALUATION: ICD-10-CM

## 2021-06-28 DIAGNOSIS — I44.4 LAFB (LEFT ANTERIOR FASCICULAR BLOCK): ICD-10-CM

## 2021-06-28 LAB
LV EF: 63 %
LVEF MODALITY: NORMAL

## 2021-06-28 PROCEDURE — 78452 HT MUSCLE IMAGE SPECT MULT: CPT

## 2021-06-28 PROCEDURE — 2580000003 HC RX 258: Performed by: INTERNAL MEDICINE

## 2021-06-28 PROCEDURE — 6360000002 HC RX W HCPCS: Performed by: INTERNAL MEDICINE

## 2021-06-28 PROCEDURE — 3430000000 HC RX DIAGNOSTIC RADIOPHARMACEUTICAL: Performed by: INTERNAL MEDICINE

## 2021-06-28 PROCEDURE — 93017 CV STRESS TEST TRACING ONLY: CPT

## 2021-06-28 PROCEDURE — A9502 TC99M TETROFOSMIN: HCPCS | Performed by: INTERNAL MEDICINE

## 2021-06-28 RX ORDER — SODIUM CHLORIDE 0.9 % (FLUSH) 0.9 %
10 SYRINGE (ML) INJECTION PRN
Status: DISCONTINUED | OUTPATIENT
Start: 2021-06-28 | End: 2021-06-29 | Stop reason: HOSPADM

## 2021-06-28 RX ADMIN — Medication 10 ML: at 15:03

## 2021-06-28 RX ADMIN — REGADENOSON 0.4 MG: 0.08 INJECTION, SOLUTION INTRAVENOUS at 15:03

## 2021-06-28 RX ADMIN — TETROFOSMIN 30 MILLICURIE: 1.38 INJECTION, POWDER, LYOPHILIZED, FOR SOLUTION INTRAVENOUS at 15:04

## 2021-06-28 RX ADMIN — TETROFOSMIN 10 MILLICURIE: 1.38 INJECTION, POWDER, LYOPHILIZED, FOR SOLUTION INTRAVENOUS at 13:15

## 2021-06-28 RX ADMIN — Medication 10 ML: at 13:13

## 2021-07-09 ENCOUNTER — ANESTHESIA EVENT (OUTPATIENT)
Dept: ENDOSCOPY | Age: 71
End: 2021-07-09
Payer: MEDICARE

## 2021-07-12 ENCOUNTER — ANESTHESIA (OUTPATIENT)
Dept: ENDOSCOPY | Age: 71
End: 2021-07-12
Payer: MEDICARE

## 2021-07-12 ENCOUNTER — HOSPITAL ENCOUNTER (OUTPATIENT)
Age: 71
Setting detail: OUTPATIENT SURGERY
Discharge: HOME | End: 2021-07-12
Attending: SURGERY | Admitting: SURGERY
Payer: MEDICARE

## 2021-07-12 VITALS
OXYGEN SATURATION: 94 % | WEIGHT: 300 LBS | HEART RATE: 68 BPM | RESPIRATION RATE: 18 BRPM | TEMPERATURE: 95.6 F | BODY MASS INDEX: 36.53 KG/M2 | DIASTOLIC BLOOD PRESSURE: 80 MMHG | SYSTOLIC BLOOD PRESSURE: 150 MMHG | HEIGHT: 76 IN

## 2021-07-12 VITALS — DIASTOLIC BLOOD PRESSURE: 75 MMHG | OXYGEN SATURATION: 99 % | SYSTOLIC BLOOD PRESSURE: 166 MMHG

## 2021-07-12 DIAGNOSIS — Z01.818 PRE-OPERATIVE CLEARANCE: ICD-10-CM

## 2021-07-12 PROCEDURE — 43239 EGD BIOPSY SINGLE/MULTIPLE: CPT | Performed by: SURGERY

## 2021-07-12 PROCEDURE — 3609012400 HC EGD TRANSORAL BIOPSY SINGLE/MULTIPLE: Performed by: SURGERY

## 2021-07-12 PROCEDURE — 7100000010 HC PHASE II RECOVERY - FIRST 15 MIN: Performed by: SURGERY

## 2021-07-12 PROCEDURE — 3700000000 HC ANESTHESIA ATTENDED CARE: Performed by: SURGERY

## 2021-07-12 PROCEDURE — 3700000001 HC ADD 15 MINUTES (ANESTHESIA): Performed by: SURGERY

## 2021-07-12 PROCEDURE — 2500000003 HC RX 250 WO HCPCS: Performed by: NURSE ANESTHETIST, CERTIFIED REGISTERED

## 2021-07-12 PROCEDURE — 6360000002 HC RX W HCPCS: Performed by: NURSE ANESTHETIST, CERTIFIED REGISTERED

## 2021-07-12 PROCEDURE — 88305 TISSUE EXAM BY PATHOLOGIST: CPT

## 2021-07-12 PROCEDURE — 2580000003 HC RX 258: Performed by: ANESTHESIOLOGY

## 2021-07-12 PROCEDURE — 7100000011 HC PHASE II RECOVERY - ADDTL 15 MIN: Performed by: SURGERY

## 2021-07-12 PROCEDURE — 2709999900 HC NON-CHARGEABLE SUPPLY: Performed by: SURGERY

## 2021-07-12 RX ORDER — SODIUM CHLORIDE 0.9 % (FLUSH) 0.9 %
10 SYRINGE (ML) INJECTION PRN
Status: DISCONTINUED | OUTPATIENT
Start: 2021-07-12 | End: 2021-07-12 | Stop reason: HOSPADM

## 2021-07-12 RX ORDER — LIDOCAINE HYDROCHLORIDE 20 MG/ML
INJECTION, SOLUTION INFILTRATION; PERINEURAL PRN
Status: DISCONTINUED | OUTPATIENT
Start: 2021-07-12 | End: 2021-07-12 | Stop reason: SDUPTHER

## 2021-07-12 RX ORDER — PROPOFOL 10 MG/ML
INJECTION, EMULSION INTRAVENOUS PRN
Status: DISCONTINUED | OUTPATIENT
Start: 2021-07-12 | End: 2021-07-12 | Stop reason: SDUPTHER

## 2021-07-12 RX ORDER — SODIUM CHLORIDE 0.9 % (FLUSH) 0.9 %
10 SYRINGE (ML) INJECTION EVERY 12 HOURS SCHEDULED
Status: DISCONTINUED | OUTPATIENT
Start: 2021-07-12 | End: 2021-07-12 | Stop reason: HOSPADM

## 2021-07-12 RX ORDER — SODIUM CHLORIDE, SODIUM LACTATE, POTASSIUM CHLORIDE, CALCIUM CHLORIDE 600; 310; 30; 20 MG/100ML; MG/100ML; MG/100ML; MG/100ML
INJECTION, SOLUTION INTRAVENOUS CONTINUOUS
Status: DISCONTINUED | OUTPATIENT
Start: 2021-07-12 | End: 2021-07-12 | Stop reason: HOSPADM

## 2021-07-12 RX ORDER — SODIUM CHLORIDE 9 MG/ML
25 INJECTION, SOLUTION INTRAVENOUS PRN
Status: DISCONTINUED | OUTPATIENT
Start: 2021-07-12 | End: 2021-07-12 | Stop reason: HOSPADM

## 2021-07-12 RX ORDER — ACETAMINOPHEN 325 MG/1
650 TABLET ORAL EVERY 6 HOURS PRN
COMMUNITY
End: 2021-09-29 | Stop reason: CLARIF

## 2021-07-12 RX ADMIN — PROPOFOL 20 MG: 10 INJECTION, EMULSION INTRAVENOUS at 08:57

## 2021-07-12 RX ADMIN — PROPOFOL 20 MG: 10 INJECTION, EMULSION INTRAVENOUS at 08:55

## 2021-07-12 RX ADMIN — PROPOFOL 20 MG: 10 INJECTION, EMULSION INTRAVENOUS at 08:56

## 2021-07-12 RX ADMIN — SODIUM CHLORIDE, POTASSIUM CHLORIDE, SODIUM LACTATE AND CALCIUM CHLORIDE: 600; 310; 30; 20 INJECTION, SOLUTION INTRAVENOUS at 07:42

## 2021-07-12 RX ADMIN — PROPOFOL 50 MG: 10 INJECTION, EMULSION INTRAVENOUS at 08:54

## 2021-07-12 RX ADMIN — PROPOFOL 20 MG: 10 INJECTION, EMULSION INTRAVENOUS at 08:59

## 2021-07-12 RX ADMIN — PROPOFOL 20 MG: 10 INJECTION, EMULSION INTRAVENOUS at 08:58

## 2021-07-12 RX ADMIN — LIDOCAINE HYDROCHLORIDE 100 MG: 20 INJECTION, SOLUTION INFILTRATION; PERINEURAL at 08:54

## 2021-07-12 ASSESSMENT — PULMONARY FUNCTION TESTS
PIF_VALUE: 1

## 2021-07-12 ASSESSMENT — PAIN SCALES - GENERAL: PAINLEVEL_OUTOF10: 0

## 2021-07-12 ASSESSMENT — PAIN - FUNCTIONAL ASSESSMENT: PAIN_FUNCTIONAL_ASSESSMENT: 0-10

## 2021-07-12 NOTE — ANESTHESIA PRE PROCEDURE
times per day Rivera Livings, DO        sodium chloride flush 0.9 % injection 10 mL  10 mL Intravenous PRN Rivera Livings, DO        0.9 % sodium chloride infusion  25 mL Intravenous PRN Rivera Livings, DO           Allergies: Allergies   Allergen Reactions    Clindamycin/Lincomycin Rash       Problem List:    Patient Active Problem List   Diagnosis Code    Hx of hepatitis C Z86.19    Cellulitis of foot, left L03. [address] [facility] hypertension I10    Idiopathic peripheral neuropathy G60.9    Toe osteomyelitis, right (HCC) M86.9    Chronic ulcer of left ankle with fat layer exposed ([address] L97.322    Chronic osteomyelitis of left foot with draining sinus (HCC) M86.472    Multiple drug resistant organism (MDRO) culture positive Z16.24    Primary osteoarthritis of left knee M17.12    Chronic pain of left knee M25.562, G89.29       Past Medical History:        Diagnosis Date    Chest pain 1/2/2015    Chronic GERD     ESBL (extended spectrum beta-lactamase) producing bacteria infection 09/11/2016    E.coli ESBL-8/2015    Hepatitis C virus 01/02/2015    says he received treatment    Hx of hepatitis C 1/16/2015    Hyperlipidemia     Hypertension     Hypertension     Hypertension, essential     MDRO (multiple drug resistant organisms) resistance 09/10/2016    Pseudomonas MDRO in foot 6/23/16    MDRO (multiple drug resistant organisms) resistance 9/22/17; 09/08/2017    Pseudomonas MDR in wound    Morbid obesity ([address]     Non-pressure chronic ulcer of other part of left foot limited to breakdown of skin ([address]     Non-pressure chronic ulcer of other part of right foot limited to breakdown of skin ([address]     Obstructive sleep apnea     Peripheral neuropathy 1/2/2015    Pre-operative clearance        Past Surgical History:        Procedure Laterality Date    FOOT SURGERY Bilateral 10/8/2015    left foot bone biopsy X 2, bilat wound debridement, bilat.  amnio graft    FOOT SURGERY Right 2016    TRANSMETATARSAL AMPUTATION RIGHT FOOT;    FOOT SURGERY Left 2017    INCISION AND DRAINAGE WITH EXCISION OF ALL NECROTIC SOFT TISSUE AND BONE, LEFT FOOT WITH APPLICATION OF WOUND VAC    OTHER SURGICAL HISTORY  2016    PARTIAL FIRST RAY TOE AMPUTATION - RIGHT FOOT (VANCOMYCIN    OTHER SURGICAL HISTORY Left 2017     TRANSMETATARSAL AMPUTATION LEFT FOOT ;    OTHER SURGICAL HISTORY Left 2017    INCISION AND DRAINAGE OF NECROTIC TISSUE AND BONE WITH RE-    OTHER SURGICAL HISTORY Left 2017    RESECTION FIRST METATARSAL WITH PARTIAL RESECTION SECOND,    TONSILLECTOMY         Social History:    Social History     Tobacco Use    Smoking status: Former Smoker     Packs/day: 0.25     Years: 5.00     Pack years: 1.25     Quit date: 2016     Years since quittin.8    Smokeless tobacco: Never Used   Substance Use Topics    Alcohol use: Yes     Alcohol/week: 3.0 standard drinks     Types: 3 Shots of liquor per week                                Counseling given: Not Answered      Vital Signs (Current):   Vitals:    21 0735   Resp: 24   Temp: 96.9 °F (36.1 °C)   TempSrc: Oral   Weight: 300 lb (136.1 kg)   Height: 6' 4\" (1.93 m)                                              BP Readings from Last 3 Encounters:   21 (!) 149/76   21 (!) 138/90   21 (!) 142/88       NPO Status: Time of last liquid consumption:                         Time of last solid consumption:                         Date of last liquid consumption: 21                        Date of last solid food consumption: 21    BMI:   Wt Readings from Last 3 Encounters:   21 300 lb (136.1 kg)   21 (!) 304 lb 6.4 oz (138.1 kg)   21 (!) 303 lb 12.8 oz (137.8 kg)     Body mass index is 36.52 kg/m².     CBC:   Lab Results   Component Value Date    WBC 7.5 2021    RBC 5.54 2021    HGB 17.7 2021    HCT 51.9 2021    MCV 93.8 2021 RDW 14.3 05/28/2021     05/28/2021       CMP:   Lab Results   Component Value Date     05/28/2021    K 4.8 05/28/2021     05/28/2021    CO2 16 05/28/2021    BUN 27 05/28/2021    CREATININE 1.5 05/28/2021    GFRAA 56 05/28/2021    AGRATIO 1.7 05/28/2021    LABGLOM 46 05/28/2021    GLUCOSE 136 05/28/2021    PROT 7.4 05/28/2021    CALCIUM 9.4 05/28/2021    BILITOT 0.6 05/28/2021    ALKPHOS 73 05/28/2021    AST 52 05/28/2021    ALT 71 05/28/2021       POC Tests: No results for input(s): POCGLU, POCNA, POCK, POCCL, POCBUN, POCHEMO, POCHCT in the last 72 hours. Coags:   Lab Results   Component Value Date    PROTIME 11.1 10/04/2017    INR 0.98 10/04/2017       HCG (If Applicable): No results found for: PREGTESTUR, PREGSERUM, HCG, HCGQUANT     ABGs: No results found for: PHART, PO2ART, ZNZ2ZBQ, OLH5PZT, BEART, E4BAXDGM     Type & Screen (If Applicable):  No results found for: LABABO, LABRH    Drug/Infectious Status (If Applicable):  No results found for: HIV, HEPCAB    COVID-19 Screening (If Applicable): No results found for: COVID19        Anesthesia Evaluation  Patient summary reviewed and Nursing notes reviewed  Airway: Mallampati: II  TM distance: >3 FB   Neck ROM: full  Mouth opening: > = 3 FB Dental: normal exam         Pulmonary:normal exam    (+) sleep apnea: on CPAP,            Patient did not smoke on day of surgery. Cardiovascular:Negative CV ROS    (+) hypertension: mild,         Rhythm: regular  Rate: normal           Beta Blocker:  Not on Beta Blocker         Neuro/Psych:   (+) neuromuscular disease:,             GI/Hepatic/Renal:   (+) GERD: well controlled, hepatitis: C, liver disease:,           Endo/Other: Negative Endo/Other ROS                    Abdominal:       Abdomen: soft. Vascular: negative vascular ROS. Other Findings:             Anesthesia Plan      MAC     ASA 3       Induction: intravenous.     MIPS: Postoperative opioids intended and Prophylactic antiemetics administered. Anesthetic plan and risks discussed with patient. Use of blood products discussed with patient whom consented to blood products. Plan discussed with attending and CRNA.     Attending anesthesiologist reviewed and agrees with Preprocedure content            Luke Little DO   7/12/2021

## 2021-07-12 NOTE — H&P
General Surgery   Resident History and Physical       Chief Complaint: pre op    History of Present Illness:    Jose Armando Beltran is a very pleasant 69 y.o. male with Body mass index is 36.98 kg/m². , Pre-Surgery. Pre-operative clearance and work up pending. Working hard to keep good dietary habits as well level of activity. Patient denies any nausea, vomiting, fevers, chills, shortness of breath, chest pain, cough, constipation or difficulty urinating. Past Medical History:        Diagnosis Date    Chest pain 1/2/2015    Chronic GERD     ESBL (extended spectrum beta-lactamase) producing bacteria infection 09/11/2016    E.coli ESBL-8/2015    Hepatitis C virus 01/02/2015    says he received treatment    Hx of hepatitis C 1/16/2015    Hyperlipidemia     Hypertension     Hypertension     Hypertension, essential     MDRO (multiple drug resistant organisms) resistance 09/10/2016    Pseudomonas MDRO in foot 6/23/16    MDRO (multiple drug resistant organisms) resistance 9/22/17; 09/08/2017    Pseudomonas MDR in wound    Morbid obesity ([address]     Non-pressure chronic ulcer of other part of left foot limited to breakdown of skin ([address]     Non-pressure chronic ulcer of other part of right foot limited to breakdown of skin ([address]     Obstructive sleep apnea     Peripheral neuropathy 1/2/2015    Pre-operative clearance        Past Surgical History:        Procedure Laterality Date    FOOT SURGERY Bilateral 10/8/2015    left foot bone biopsy X 2, bilat wound debridement, bilat.  amnio graft    FOOT SURGERY Right 09/13/2016    TRANSMETATARSAL AMPUTATION RIGHT FOOT;    FOOT SURGERY Left 09/25/2017    INCISION AND DRAINAGE WITH EXCISION OF ALL NECROTIC SOFT TISSUE AND BONE, LEFT FOOT WITH APPLICATION OF WOUND VAC    OTHER SURGICAL HISTORY  09/11/2016    PARTIAL FIRST RAY TOE AMPUTATION - RIGHT FOOT (VANCOMYCIN    OTHER SURGICAL HISTORY Left 09/08/2017     TRANSMETATARSAL AMPUTATION LEFT FOOT ;    OTHER SURGICAL HISTORY Left 09/27/2017    INCISION AND DRAINAGE OF NECROTIC TISSUE AND BONE WITH RE-    OTHER SURGICAL HISTORY Left 09/30/2017    RESECTION FIRST METATARSAL WITH PARTIAL RESECTION SECOND,    TONSILLECTOMY         Allergies:    Clindamycin/lincomycin    Medications:   Home Meds  No current facility-administered medications on file prior to encounter. Current Outpatient Medications on File Prior to Encounter   Medication Sig Dispense Refill    acetaminophen (TYLENOL) 325 MG tablet Take 650 mg by mouth every 6 hours as needed for Pain      budesonide-formoterol (SYMBICORT) 80-4.5 MCG/ACT AERO Inhale 2 puffs into the lungs 2 times daily as needed (SOB) 3 Inhaler 3    atenolol-chlorthalidone (TENORETIC 100) 100-25 MG per tablet Take 1 tablet by mouth daily 90 tablet 3    mirtazapine (REMERON) 7.5 MG tablet Take 1 tablet by mouth nightly 90 tablet 1    Esomeprazole Magnesium 20 MG TBEC       b complex vitamins capsule Take 1 capsule by mouth daily      Multiple Vitamins-Minerals (SENTRY SENIOR) TABS Take 1 tablet by mouth daily      Omega 3-6-9 Fatty Acids (OMEGA 3-6-9 COMPLEX) CAPS Take 1 tablet by mouth daily      zinc gluconate 50 MG tablet Take 50 mg by mouth daily      sildenafil (VIAGRA) 100 MG tablet Take one tablet by mouth as needed 1 hour prior to sexual activity. 6 tablet 3    acetaminophen (TYLENOL) 325 MG tablet Take 650 mg by mouth every 6 hours as needed for Pain         Current Meds  lactated ringers infusion, Continuous  sodium chloride flush 0.9 % injection 10 mL, 2 times per day  sodium chloride flush 0.9 % injection 10 mL, PRN  0.9 % sodium chloride infusion, PRN        Family History:   History reviewed. No pertinent family history. Social History:   TOBACCO:   reports that he quit smoking about 4 years ago. He has a 1.25 pack-year smoking history.  He has never used smokeless tobacco.  ETOH:   reports current alcohol use of about 3.0 standard drinks of alcohol per Results   Component Value Date    AST 52 05/28/2021    ALT 71 05/28/2021    BILITOT 0.6 05/28/2021    ALKPHOS 73 05/28/2021     Lab Results   Component Value Date    CHOL 219 05/28/2021    HDL 36 05/28/2021    TRIG 422 05/28/2021     UA:   Lab Results   Component Value Date    COLORU YELLOW 01/11/2021    PHUR 5.5 01/11/2021    WBCUA 0-2 09/29/2017    RBCUA 3-5 09/29/2017    BACTERIA Rare 09/29/2017    CLARITYU Clear 01/11/2021    SPECGRAV 1.019 01/11/2021    LEUKOCYTESUR Negative 01/11/2021    UROBILINOGEN 0.2 01/11/2021    BILIRUBINUR Negative 01/11/2021    BLOODU Negative 01/11/2021    GLUCOSEU Negative 01/11/2021       Imaging:   No orders to display       Assessment/Plan:  Oral Drones is 69 y.o. male, Body mass index is 36.98 kg/m².  pre surgery    - Patient is stable for EGD today  - Patient will follow up with Dr. Almond Siemens as scheduled    Donna Bhandari DO  07/12/21  8:16 AM

## 2021-07-12 NOTE — OP NOTE
Esophagogastroduodenoscopy     Indications: Pre-op gastric Surgery, rule out Gastroesophageal reflux disease. Pre-operative Diagnosis: Obesity/pre-op bariatric surgery . Post-operative Diagnosis: Gastritis    Surgeon: Lia Escalante    Anesthesia: MAC      Procedure Details   The patient was seen in the Holding Room. The risks, benefits, complications, treatment options, and expected outcomes were discussed with the patient. Pre-op Endoscopy recommended to rule any intragastric pathology that would interfere with proposed procedure /  And or due to current conditions. The possibilities of reaction to medication, pulmonary aspiration, perforation of viscus, bleeding, recurrent infection, the need for additional procedures, failure to diagnose a condition, and creating a complication requiring transfusion or operation were discussed with the patient. The patient concurred with the proposed plan, giving informed consent. The site of surgery properly noted/marked. The patient was taken to Endoscopy Suite, identified as Nahum Donovan and the procedure verified as  Esophagogastroduodenoscopy  A Time Out was held and the above information confirmed. Upper Endoscopy: An endoscope was inserted through the oropharynx into the upper esophagus. The endoscope was passed into the stomach to the level of the pylorus and passed to the duodenum where the ampulla was visualized and duodenum was normal. Then the scope was retracted back to the stomach and it was normal except for gastritis, biopsy of the antrum obtained for H. Pylori, then retroflexed and the gastroesophageal junction was inspected.  There was no obvious hiatal hernia and no evidence of Miranda's     Findings:  Esophageal findings include:  Upper: normal Esophageal Mucosa  Lower:normal Esophageal Mucosa  Distal: normal Esophageal Mucosa      Gastric findings include: abnormal Gastric Mucosa    Duodenal Findings: normal Duodenal Mucosa      Estimated Blood Loss:  none    Biopsy:  Antrum    Complications:  None; patient tolerated the procedure well. Disposition: PACU - hemodynamically stable. Condition: stable    Attending Attestation: I was present and scrubbed for the entire procedure.

## 2021-07-12 NOTE — ANESTHESIA POSTPROCEDURE EVALUATION
Department of Anesthesiology  Postprocedure Note    Patient: Otis Koo  MRN: [id number]  YOB: 1950  Date of evaluation: 7/12/2021  Time:  10:36 AM     Procedure Summary     Date: 07/12/21 Room / Location: [facility]    Anesthesia Start: 3059 Anesthesia Stop: 4067    Procedure: EGD BIOPSY (N/A ) Diagnosis:       Pre-operative clearance      (Pre-operative clearance [Z01.818]E)    Surgeons: Santos Morse DO Responsible Provider: Chaitanya Ayala DO    Anesthesia Type: MAC ASA Status: 3          Anesthesia Type: MAC    Meenu Phase I: Meenu Score: 10    Meenu Phase II: Meenu Score: 9    Last vitals: Reviewed and per EMR flowsheets.        Anesthesia Post Evaluation    Patient location during evaluation: bedside  Patient participation: complete - patient participated  Level of consciousness: awake  Pain score: 0  Airway patency: patent  Nausea & Vomiting: no vomiting and no nausea  Complications: no  Cardiovascular status: blood pressure returned to baseline  Respiratory status: acceptable  Hydration status: euvolemic

## 2021-07-14 ENCOUNTER — OFFICE VISIT (OUTPATIENT)
Dept: BARIATRICS/WEIGHT MGMT | Age: 71
End: 2021-07-14
Payer: MEDICARE

## 2021-07-14 VITALS — WEIGHT: 306.2 LBS | HEIGHT: 76 IN | BODY MASS INDEX: 37.29 KG/M2

## 2021-07-14 DIAGNOSIS — I10 ESSENTIAL HYPERTENSION: ICD-10-CM

## 2021-07-14 DIAGNOSIS — N18.9 CHRONIC KIDNEY DISEASE, UNSPECIFIED CKD STAGE: ICD-10-CM

## 2021-07-14 DIAGNOSIS — N18.30 STAGE 3 CHRONIC KIDNEY DISEASE, UNSPECIFIED WHETHER STAGE 3A OR 3B CKD (HCC): ICD-10-CM

## 2021-07-14 DIAGNOSIS — E66.01 SEVERE OBESITY (BMI 35.0-39.9) WITH COMORBIDITY (HCC): Primary | ICD-10-CM

## 2021-07-14 LAB
EPITHELIAL CELLS, UA: 1 /HPF (ref 0–5)
HYALINE CASTS: 1 /LPF (ref 0–8)
RBC UA: 2 /HPF (ref 0–4)
URINE TYPE: ABNORMAL
WBC UA: 7 /HPF (ref 0–5)

## 2021-07-14 PROCEDURE — G8417 CALC BMI ABV UP PARAM F/U: HCPCS | Performed by: SURGERY

## 2021-07-14 PROCEDURE — 99213 OFFICE O/P EST LOW 20 MIN: CPT | Performed by: SURGERY

## 2021-07-14 PROCEDURE — 3017F COLORECTAL CA SCREEN DOC REV: CPT | Performed by: SURGERY

## 2021-07-14 PROCEDURE — 1123F ACP DISCUSS/DSCN MKR DOCD: CPT | Performed by: SURGERY

## 2021-07-14 PROCEDURE — G8427 DOCREV CUR MEDS BY ELIG CLIN: HCPCS | Performed by: SURGERY

## 2021-07-14 PROCEDURE — 4040F PNEUMOC VAC/ADMIN/RCVD: CPT | Performed by: SURGERY

## 2021-07-14 PROCEDURE — 1036F TOBACCO NON-USER: CPT | Performed by: SURGERY

## 2021-07-14 NOTE — PROGRESS NOTES
Mariana Beverly gained 2.4 lbs over 1 month. Pt trying to eat small frequent snacks. Pt reports eating foods mostly from deli, grazing - does not have a set eating schedule. Pt states doesn't really cook. Is pt eating at least 4 times everyday? grazing    Is pt eating a lean protein source with all meals and snacks? ham salad / pre-cooked meatloaf / deviled eggs / poached eggs with lot of shredded cheese / deli slices    Has pt decreased their portions using the plate method? Trying    Is pt choosing low fat/sugar free options? Could work on lower fat options    Is pt drinking at least 64 oz of clear liquids everyday? Yes - water / SF Koolaid / diet grape juice / decaf tea / decaf coffee    Has pt stopped drinking carbonation, caffeinated, and sugar sweetened beverages? Eliminated soft drinks    Has pt sampled Unjury and/or Nectar protein?  No, reviewed and encouraged    Participating in intentional exercise? gym at home - 3x week    Plan/Recommendations:   - Intentional eat 4-5x day  - Practice prepping LF food options - egg salad or chix salad with Light gomez  - Try protein powder    Handouts: none    Kelsey Moore, DAKSHA, LD

## 2021-07-15 LAB
ALBUMIN SERPL-MCNC: 4.9 G/DL (ref 3.4–5)
ANION GAP SERPL CALCULATED.3IONS-SCNC: 18 MMOL/L (ref 3–16)
BUN BLDV-MCNC: 22 MG/DL (ref 7–20)
CALCIUM SERPL-MCNC: 9.7 MG/DL (ref 8.3–10.6)
CHLORIDE BLD-SCNC: 99 MMOL/L (ref 99–110)
CO2: 23 MMOL/L (ref 21–32)
CREAT SERPL-MCNC: 1.2 MG/DL (ref 0.8–1.3)
CREATININE URINE: 183.2 MG/DL (ref 39–259)
GFR AFRICAN AMERICAN: >60
GFR NON-AFRICAN AMERICAN: 60
GLUCOSE BLD-MCNC: 144 MG/DL (ref 70–99)
MICROALBUMIN UR-MCNC: 2.7 MG/DL
MICROALBUMIN/CREAT UR-RTO: 14.7 MG/G (ref 0–30)
PHOSPHORUS: 3.1 MG/DL (ref 2.5–4.9)
POTASSIUM SERPL-SCNC: 4.2 MMOL/L (ref 3.5–5.1)
SODIUM BLD-SCNC: 140 MMOL/L (ref 136–145)

## 2021-07-16 LAB
KAPPA, FREE LIGHT CHAINS, SERUM: 11.56 MG/L (ref 3.3–19.4)
KAPPA/LAMBDA RATIO: 0.8 (ref 0.26–1.65)
KAPPA/LAMBDA TEST COMMENT: NORMAL
LAMBDA, FREE LIGHT CHAINS, SERUM: 14.46 MG/L (ref 5.71–26.3)

## 2021-08-13 NOTE — PROGRESS NOTES
[facility]) Physicians   General & Laparoscopic Surgery  [employer]       HPI:     Daiz Pandya is a very pleasant 66 y.o. male with Body mass index is 37.27 kg/m². , Pre-Surgery. Pre-operative clearance and work up pending. Working hard to keep good dietary habits as well level of activity. Patient denies any nausea, vomiting, fevers, chills, shortness of breath, chest pain, cough, constipation or difficulty urinating. Past Medical History:   Diagnosis Date    Chest pain 1/2/2015    Chronic GERD     ESBL (extended spectrum beta-lactamase) producing bacteria infection 09/11/2016    E.coli ESBL-8/2015    Hepatitis C virus 01/02/2015    says he received treatment    Hx of hepatitis C 1/16/2015    Hyperlipidemia     Hypertension     Hypertension     Hypertension, essential     MDRO (multiple drug resistant organisms) resistance 09/10/2016    Pseudomonas MDRO in foot 6/23/16    MDRO (multiple drug resistant organisms) resistance 9/22/17; 09/08/2017    Pseudomonas MDR in wound    Morbid obesity ([address]     Non-pressure chronic ulcer of other part of left foot limited to breakdown of skin ([address]     Non-pressure chronic ulcer of other part of right foot limited to breakdown of skin ([address]     Obstructive sleep apnea     Peripheral neuropathy 1/2/2015    Pre-operative clearance      Past Surgical History:   Procedure Laterality Date    FOOT SURGERY Bilateral 10/8/2015    left foot bone biopsy X 2, bilat wound debridement, bilat.  amnio graft    FOOT SURGERY Right 09/13/2016    TRANSMETATARSAL AMPUTATION RIGHT FOOT;    FOOT SURGERY Left 09/25/2017    INCISION AND DRAINAGE WITH EXCISION OF ALL NECROTIC SOFT TISSUE AND BONE, LEFT FOOT WITH APPLICATION OF WOUND VAC    OTHER SURGICAL HISTORY  09/11/2016    PARTIAL FIRST RAY TOE AMPUTATION - RIGHT FOOT (VANCOMYCIN    OTHER SURGICAL HISTORY Left 09/08/2017     TRANSMETATARSAL AMPUTATION LEFT FOOT ;    OTHER SURGICAL HISTORY Left 2017    INCISION AND DRAINAGE OF NECROTIC TISSUE AND BONE WITH RE-    OTHER SURGICAL HISTORY Left 2017    RESECTION FIRST METATARSAL WITH PARTIAL RESECTION SECOND,    TONSILLECTOMY      UPPER GASTROINTESTINAL ENDOSCOPY N/A 2021    EGD BIOPSY performed by Katja Berg DO at [address] [place] [address] reviewed. No pertinent family history. Social History     Tobacco Use    Smoking status: Former Smoker     Packs/day: 0.25     Years: 5.00     Pack years: 1.25     Quit date: 2016     Years since quittin.9    Smokeless tobacco: Never Used   Substance Use Topics    Alcohol use: Yes     Alcohol/week: 3.0 standard drinks     Types: 3 Shots of liquor per week     I counseled the patient on the importance of not smoking and risks of ETOH. Allergies   Allergen Reactions    Clindamycin/Lincomycin Rash     Vitals:    21 1406   Weight: (!) 306 lb 3.2 oz (138.9 kg)   Height: 6' 4\" (1.93 m)       Body mass index is 37.27 kg/m². Current Outpatient Medications:     acetaminophen (TYLENOL) 325 MG tablet, Take 650 mg by mouth every 6 hours as needed for Pain, Disp: , Rfl:     budesonide-formoterol (SYMBICORT) 80-4.5 MCG/ACT AERO, Inhale 2 puffs into the lungs 2 times daily as needed (SOB), Disp: 3 Inhaler, Rfl: 3    atenolol-chlorthalidone (TENORETIC 100) 100-25 MG per tablet, Take 1 tablet by mouth daily, Disp: 90 tablet, Rfl: 3    mirtazapine (REMERON) 7.5 MG tablet, Take 1 tablet by mouth nightly, Disp: 90 tablet, Rfl: 1    Esomeprazole Magnesium 20 MG TBEC, , Disp: , Rfl:     sildenafil (VIAGRA) 100 MG tablet, Take one tablet by mouth as needed 1 hour prior to sexual activity. , Disp: 6 tablet, Rfl: 3    b complex vitamins capsule, Take 1 capsule by mouth daily, Disp: , Rfl:     Multiple Vitamins-Minerals (SENTRY SENIOR) TABS, Take 1 tablet by mouth daily, Disp: , Rfl:     Omega 3-6-9 Fatty Acids (OMEGA 3-6-9 COMPLEX) CAPS, Take 1 tablet by mouth daily, Disp: , Rfl:     zinc gluconate 50 MG tablet, Take 50 mg by mouth daily, Disp: , Rfl:     acetaminophen (TYLENOL) 325 MG tablet, Take 650 mg by mouth every 6 hours as needed for Pain, Disp: , Rfl:       Review of Systems - History obtained from the patient  General ROS: negative  Psychological ROS: negative  Endocrine ROS: negative  Respiratory ROS: negative  Cardiovascular ROS: negative  Gastrointestinal ROS:negative  Genito-Urinary ROS: negative  Musculoskeletal ROS: negative   Skin ROS: negative    Physical Exam   Vitals Reviewed   Constitutional: Patient is oriented to person, place, and time. Patient appears well-developed and well-nourished. Patient is active and cooperative. Non-toxic appearance. No distress. Neck: Trachea normal and normal range of motion. No JVD present. Pulmonary/Chest: Effort normal. No accessory muscle usage or stridor. No apnea. No respiratory distress. Cardiovascular: Normal rate and no JVD. Abdominal: Normal appearance. Patient exhibits no distension. Abdomen is soft, obese, non tender. Musculoskeletal: Normal range of motion. Patient exhibits no edema. Neurological: Patient is alert and oriented to person, place, and time. Patient has normal strength. GCS eye subscore is 4. GCS verbal subscore is 5. GCS motor subscore is 6. Skin: Skin is warm and dry. No abrasion and no rash noted. Patient is not diaphoretic. No cyanosis or erythema. Psychiatric: Patient has a normal mood and affect. Speech is normal and behavior is normal. Cognition and memory are normal.       A/P    Ronny Whittington is 66 y.o. male, Body mass index is 37.27 kg/m². pre surgery, has gained 2# since last visit. The patient underwent dietary counseling with registered dietician. I have reviewed, discussed and agree with the dietary plan. Patient is trying hard to keep good dietary and behavior modifications. Patient is monitoring portion sizes, food choices and liquid calories.   Patient is trying to exercise regularly as much as possible. We discussed how his weight affects his overall health including:  Janeen Taylor was seen today for obesity. Diagnoses and all orders for this visit:    Severe obesity (BMI 35.0-39. 9) with comorbidity ([address]    Essential hypertension    Chronic kidney disease, unspecified CKD stage       and importance of weight loss to alleviate those co morbid conditions. I encouraged the patient to continue exercise and keeping healthy eating habits. Discussed pre-op labs and work up till now. Also counseled the patient extensively on Surgery. Total encounter time: 20 minutes including any number of the following: review of labs, imaging, provider notes, outside hospital records; performing examination/evaluation; counseling patient and family; ordering medications/tests; placing referrals and communication with referring physicians; coordination of care, and documentation in the EHR. RTC in 4 weeks  Obtain rest of pre-op work up / clearances  Diet and Exercise      Patient advised that its their responsibility to follow up for studies and/or labs ordered today.      Lia Escalante

## 2021-08-25 ENCOUNTER — OFFICE VISIT (OUTPATIENT)
Dept: BARIATRICS/WEIGHT MGMT | Age: 71
End: 2021-08-25
Payer: MEDICARE

## 2021-08-25 ENCOUNTER — TELEPHONE (OUTPATIENT)
Dept: BARIATRICS/WEIGHT MGMT | Age: 71
End: 2021-08-25

## 2021-08-25 VITALS — HEIGHT: 76 IN | BODY MASS INDEX: 36.06 KG/M2 | WEIGHT: 296.13 LBS

## 2021-08-25 DIAGNOSIS — I10 ESSENTIAL HYPERTENSION: ICD-10-CM

## 2021-08-25 DIAGNOSIS — E66.01 SEVERE OBESITY (BMI 35.0-39.9) WITH COMORBIDITY (HCC): Primary | ICD-10-CM

## 2021-08-25 DIAGNOSIS — M17.12 PRIMARY OSTEOARTHRITIS OF LEFT KNEE: ICD-10-CM

## 2021-08-25 DIAGNOSIS — Z01.818 PRE-OPERATIVE CLEARANCE: Primary | ICD-10-CM

## 2021-08-25 PROCEDURE — 1036F TOBACCO NON-USER: CPT | Performed by: SURGERY

## 2021-08-25 PROCEDURE — 4040F PNEUMOC VAC/ADMIN/RCVD: CPT | Performed by: SURGERY

## 2021-08-25 PROCEDURE — G8417 CALC BMI ABV UP PARAM F/U: HCPCS | Performed by: SURGERY

## 2021-08-25 PROCEDURE — 3017F COLORECTAL CA SCREEN DOC REV: CPT | Performed by: SURGERY

## 2021-08-25 PROCEDURE — 99214 OFFICE O/P EST MOD 30 MIN: CPT | Performed by: SURGERY

## 2021-08-25 PROCEDURE — G8427 DOCREV CUR MEDS BY ELIG CLIN: HCPCS | Performed by: SURGERY

## 2021-08-25 PROCEDURE — 1123F ACP DISCUSS/DSCN MKR DOCD: CPT | Performed by: SURGERY

## 2021-08-25 NOTE — PROGRESS NOTES
[facility]) Physicians   General & Laparoscopic Surgery  [employer]       HPI:     Damien Yee is a very pleasant 66 y.o. male with Body mass index is 36.05 kg/m². , Pre-Surgery. Pre-operative clearance and work up pending. Working hard to keep good dietary habits as well level of activity. Patient denies any nausea, vomiting, fevers, chills, shortness of breath, chest pain, cough, constipation or difficulty urinating. Past Medical History:   Diagnosis Date    Chest pain 1/2/2015    Chronic GERD     ESBL (extended spectrum beta-lactamase) producing bacteria infection 09/11/2016    E.coli ESBL-8/2015    Hepatitis C virus 01/02/2015    says he received treatment    Hx of hepatitis C 1/16/2015    Hyperlipidemia     Hypertension     Hypertension     Hypertension, essential     MDRO (multiple drug resistant organisms) resistance 09/10/2016    Pseudomonas MDRO in foot 6/23/16    MDRO (multiple drug resistant organisms) resistance 9/22/17; 09/08/2017    Pseudomonas MDR in wound    Morbid obesity ([address]     Non-pressure chronic ulcer of other part of left foot limited to breakdown of skin ([address]     Non-pressure chronic ulcer of other part of right foot limited to breakdown of skin ([address]     Obstructive sleep apnea     Peripheral neuropathy 1/2/2015    Pre-operative clearance      Past Surgical History:   Procedure Laterality Date    FOOT SURGERY Bilateral 10/8/2015    left foot bone biopsy X 2, bilat wound debridement, bilat.  amnio graft    FOOT SURGERY Right 09/13/2016    TRANSMETATARSAL AMPUTATION RIGHT FOOT;    FOOT SURGERY Left 09/25/2017    INCISION AND DRAINAGE WITH EXCISION OF ALL NECROTIC SOFT TISSUE AND BONE, LEFT FOOT WITH APPLICATION OF WOUND VAC    OTHER SURGICAL HISTORY  09/11/2016    PARTIAL FIRST RAY TOE AMPUTATION - RIGHT FOOT (VANCOMYCIN    OTHER SURGICAL HISTORY Left 09/08/2017     TRANSMETATARSAL AMPUTATION LEFT FOOT ;    OTHER SURGICAL HISTORY Left 2017    INCISION AND DRAINAGE OF NECROTIC TISSUE AND BONE WITH RE-    OTHER SURGICAL HISTORY Left 2017    RESECTION FIRST METATARSAL WITH PARTIAL RESECTION SECOND,    TONSILLECTOMY      UPPER GASTROINTESTINAL ENDOSCOPY N/A 2021    EGD BIOPSY performed by Alina Ruiz DO at [facility] END[facility]     No family history on file. Social History     Tobacco Use    Smoking status: Former Smoker     Packs/day: 0.25     Years: 5.00     Pack years: 1.25     Quit date: 2016     Years since quittin.9    Smokeless tobacco: Never Used   Substance Use Topics    Alcohol use: Yes     Alcohol/week: 3.0 standard drinks     Types: 3 Shots of liquor per week     I counseled the patient on the importance of not smoking and risks of ETOH. Allergies   Allergen Reactions    Clindamycin/Lincomycin Rash     Vitals:    21 1321   Weight: 296 lb 2 oz (134.3 kg)   Height: 6' 4\" (1.93 m)       Body mass index is 36.05 kg/m².       Current Outpatient Medications:     acetaminophen (TYLENOL) 325 MG tablet, Take 650 mg by mouth every 6 hours as needed for Pain, Disp: , Rfl:     budesonide-formoterol (SYMBICORT) 80-4.5 MCG/ACT AERO, Inhale 2 puffs into the lungs 2 times daily as needed (SOB), Disp: 3 Inhaler, Rfl: 3    atenolol-chlorthalidone (TENORETIC 100) 100-25 MG per tablet, Take 1 tablet by mouth daily, Disp: 90 tablet, Rfl: 3    mirtazapine (REMERON) 7.5 MG tablet, Take 1 tablet by mouth nightly, Disp: 90 tablet, Rfl: 1    Esomeprazole Magnesium 20 MG TBEC, , Disp: , Rfl:     b complex vitamins capsule, Take 1 capsule by mouth daily, Disp: , Rfl:     Multiple Vitamins-Minerals (SENTRY SENIOR) TABS, Take 1 tablet by mouth daily, Disp: , Rfl:     Omega 3-6-9 Fatty Acids (OMEGA 3-6-9 COMPLEX) CAPS, Take 1 tablet by mouth daily, Disp: , Rfl:     zinc gluconate 50 MG tablet, Take 50 mg by mouth daily, Disp: , Rfl:     sildenafil (VIAGRA) 100 MG tablet, Take one tablet by mouth as needed 1 hour prior to discussed how his weight affects his overall health including:  Elvira Schilling was seen today for obesity. Diagnoses and all orders for this visit:    Severe obesity (BMI 35.0-39. 9) with comorbidity ([address]    Essential hypertension    Primary osteoarthritis of left knee       and importance of weight loss to alleviate those co morbid conditions. I encouraged the patient to continue exercise and keeping healthy eating habits. Discussed pre-op labs and work up till now. Also counseled the patient extensively on Surgery. Following The Metabolic and Bariatric Surgery Accreditation and Quality Improvement Program [facility]), and [address] of Surgeons ([facility]) recommendations, the Bariatric Surgical Risk/Benefit Calculator was used, and report was discussed with patient, who wishes to proceed with surgery, fully understanding the risks and benefits. Of note, [facility] Bariatric Surgical Risk/Benefit Calculator estimates the chance of an unfavorable outcome (such as a complication or death), the chance of remission of weight-related comorbidities, and the patient's BMI, weight change, and percent total weight change after surgery. These quantities are estimated based upon information the patient gives the healthcare provider about prior health history. The estimates are calculated using data from a large number of patients who had a primary bariatric surgical procedure similar to the one the patient may have. Please note the risk percentages, remission percentages, BMI, weight change, and percent total weight change provided to you by the risk/benefit calculator are only estimates. These estimates only take certain information into account. There may be other factors that are not included in the estimate which may increase or decrease the risk of a complication, the chance of remission of a weight-related comorbidity, or the amount of weight the patient loses. These estimates are not a guarantee of results.  A complication after surgery may happen even if the risk is low, a weight-related comorbidity may not go into remission even if the chances are high, and a patient may lose more or less weight than predicted. This information is not intended to replace the advice of a doctor or healthcare provider about the diagnosis, treatment, or potential outcomes. The Provider is not responsible for medical decisions that may be made by the patient based on the risk/benefit calculator estimates, since these estimates are provided for informational purposes. Patients should always consult their doctor or other health care provider before deciding on a treatment plan. Both open and laparoscopic approach were explained in details. Benefits and risks explained. Informed consent obtained. Risks including but not limited to; Infection, bleeding, gastric leak or obstruction, persistent nausea, vomiting, or reflux, injury to surrounding structures, risks of anesthesia, stricture, delayed gastric emptying, staple line leak, incisional hernia, malnutrition , hair loss, and/ or Conversion to Open surgery may be necessary. Failure to lose or maintain weight loss, Gallstones or Kidney Stones, Deep Venous Thrombosis , pulmonary embolism and / or death. I did explain thoroughly to the patient that compliance with pre- and post op diet and other recommendations are integral part to improve the chances of successful weight loss and also not following it could end with serious health complications. We discussed that our goal is to ameliorate the medical problems and not to obtain a specific body mass index. Patient understands the risks and benefits and wishes to proceed with the procedure. Also understands if BMI is lower than 40 without significant co morbid conditions, concerns for risks of surgery being somewhat higher over long run, however patient wants to proceed and fully understands the risks.  Clearly BMI over 35 does impose very serious health risks as well chances of losing weight on diet only is very limited and sustaining weight loss is even less, thus surgery is certainly recommended for long term weight loss and better health overall given compliance. Obesity as a disease is considered a high risk to patients overall health and should therefore be considered a high risk disease state. Now with Covid-19 pandemic, CDC and health authorities does classify obese patients as vulnerable and high risk as well. Which makes weight loss a priority for improvement of their wellbeing and overall health. I advised the patient that we can't guarantee final insurance approval.        I advised the patient that sometimes other indicated procedures may arise and the decision will be based on my assessment intraoperatively. Some may include include but not limited to:  [Ventral Hernia, Risks include but not limited to; infection, bleeding, injury to organs or nerves or vessels, PE, DVT, cardiac events, , persistent pain or symptoms, abscess, hernia recurrence or need for further procedures. However that will be determined intra-operatively, if not safe to proceed , then any additional procedures will have to be addressed later as primary goal is bariatric surgery.]      [ Hiatal Hernia, will attempt repair,  risks and benefits including but not limited to; hemothorax, pneumothorax, recurrence, difficulty swallowing, persistent symptoms, reflux and need for medications, esophageal, splenic, lung, heart, bowel, vagus nerve or gastric injuries. However that will be determined intra-operatively, if not safe to proceed, then any additional procedures will have to be addressed later as primary goal is bariatric surgery.]     Patient understands that there may be a need to perform other urgent or necessary procedures that were unanticipated.  Patient consents to the performance of any additional procedures determined during the original procedure to be in their best interests and where delay might cause additional harm or put patient at risk for additional anesthesia risk for required by a second procedure and that will be determined by the surgeon. Patient is aware if Weight gain occurs in pre-op period while on pre-operative diet or  non compliant with it , surgery will be canceled. Patient understands that in relation to the COVID-19 pandemic and surgical procedures, they have been given the opportunity to postpone   surgery until a  later date, and has chosen to proceed with surgery knowing the risks associated with COVID-19. Patient was satisfied with the discussion we had and had no further questions at end of visit and wants to proceed with surgery. 1- Pre-operative work up ordered for you(labs/x-rays/EKG). 2- Stop taking Blood thinners,one week before surgery. 3- F/U with PCP for pre-op Medical Clearance. 4- Plan for Robotic Sleeve, possible other indicated procedures. Patient advised that its their responsibility to follow up for studies, referrals and/or labs ordered today.        Counseled on tobacco, etoh, and drug use in relation to risk reduction for surgery      Veto Beltran

## 2021-08-25 NOTE — PROGRESS NOTES
Tasha Pearson lost 10 lbs over the past month. Pt is very pleased with weight loss. Breakfast: 2 poached eggs with grated cheese    Snack: none    Lunch: tuna salad or egg salad or chix salad or cottage cheese, sf applesauce    Snack: Atkins bar or cashews    Dinner: meat loaf from [employer], salad    Snack: strawberries, blueberries, p/a    Is pt consuming smaller portions? yes pt is mindful of portions    Is pt consuming at least 64 oz of fluids per day? yes diet cranberry juice, water,decaf coffee    Is pt consuming carbonated, caffeinated, or sugary beverages? yes he has    Has pt sampled Unjury and/or Nectar protein?  Not yet; reviewed product info    Exercise: none organized    Plan/Recommendations: try protein; monitor fat intake    Handouts: none    Barbara Virk RD, LD

## 2021-08-26 PROBLEM — E66.01 SEVERE OBESITY (BMI 35.0-39.9) WITH COMORBIDITY (HCC): Status: ACTIVE | Noted: 2021-08-26

## 2021-08-26 ASSESSMENT — ENCOUNTER SYMPTOMS
SHORTNESS OF BREATH: 0
COUGH: 0
EYES NEGATIVE: 1
GASTROINTESTINAL NEGATIVE: 1
ALLERGIC/IMMUNOLOGIC NEGATIVE: 1
RESPIRATORY NEGATIVE: 1

## 2021-08-26 NOTE — PROGRESS NOTES
[address] [facility]   [employer] [facility] [employer]    Subjective:      Patient ID: Terrance Redmond is a 66 y.o. male    HPI    The patient is here for their bariatric surgery preoperative education group visit. He has made several attempts at weight loss in the past without success and now has been scheduled to have bariatric surgery on October 5, 2021 for future weight loss. He is working to change his dietary behaviors and lose weight to improve comorbid conditions such as hypertension. Terrance Redmond is a very pleasant 66 y.o. male with Body mass index is 36.3 kg/m². Past Medical History:   Diagnosis Date    Chest pain 1/2/2015    Chronic GERD     ESBL (extended spectrum beta-lactamase) producing bacteria infection 09/11/2016    E.coli ESBL-8/2015    Hepatitis C virus 01/02/2015    says he received treatment    Hx of hepatitis C 1/16/2015    Hyperlipidemia     Hypertension     Hypertension     Hypertension, essential     MDRO (multiple drug resistant organisms) resistance 09/10/2016    Pseudomonas MDRO in foot 6/23/16    MDRO (multiple drug resistant organisms) resistance 9/22/17; 09/08/2017    Pseudomonas MDR in wound    Morbid obesity ([address]     Non-pressure chronic ulcer of other part of left foot limited to breakdown of skin ([address]     Non-pressure chronic ulcer of other part of right foot limited to breakdown of skin ([address]     Obstructive sleep apnea     Peripheral neuropathy 1/2/2015    Pre-operative clearance      Past Surgical History:   Procedure Laterality Date    FOOT SURGERY Bilateral 10/8/2015    left foot bone biopsy X 2, bilat wound debridement, bilat.  amnio graft    FOOT SURGERY Right 09/13/2016    TRANSMETATARSAL AMPUTATION RIGHT FOOT;    FOOT SURGERY Left 09/25/2017    INCISION AND DRAINAGE WITH EXCISION OF ALL NECROTIC SOFT TISSUE AND BONE, LEFT FOOT WITH APPLICATION OF WOUND VAC    OTHER SURGICAL HISTORY  09/11/2016    PARTIAL FIRST RAY TOE AMPUTATION - RIGHT FOOT (VANCOMYCIN    OTHER SURGICAL HISTORY Left 2017     TRANSMETATARSAL AMPUTATION LEFT FOOT ;    OTHER SURGICAL HISTORY Left 2017    INCISION AND DRAINAGE OF NECROTIC TISSUE AND BONE WITH RE-    OTHER SURGICAL HISTORY Left 2017    RESECTION FIRST METATARSAL WITH PARTIAL RESECTION SECOND,    TONSILLECTOMY      UPPER GASTROINTESTINAL ENDOSCOPY N/A 2021    EGD BIOPSY performed by Judah Craft DO at [address] [place] [address] reviewed. No pertinent family history. Social History     Tobacco Use    Smoking status: Former Smoker     Packs/day: 0.25     Years: 5.00     Pack years: 1.25     Quit date: 2016     Years since quittin.9    Smokeless tobacco: Never Used   Substance Use Topics    Alcohol use: Not Currently     Alcohol/week: 3.0 standard drinks     Types: 3 Shots of liquor per week     I counseled the patient on the importance of not smoking and risks of ETOH. Allergies   Allergen Reactions    Clindamycin/Lincomycin Rash     Vitals:    21 1430   Weight: 298 lb 3.2 oz (135.3 kg)   Height: 6' 4\" (1.93 m)     Body mass index is 36.3 kg/m².     Current Outpatient Medications:     acetaminophen (TYLENOL) 325 MG tablet, Take 650 mg by mouth every 6 hours as needed for Pain, Disp: , Rfl:     budesonide-formoterol (SYMBICORT) 80-4.5 MCG/ACT AERO, Inhale 2 puffs into the lungs 2 times daily as needed (SOB), Disp: 3 Inhaler, Rfl: 3    atenolol-chlorthalidone (TENORETIC 100) 100-25 MG per tablet, Take 1 tablet by mouth daily, Disp: 90 tablet, Rfl: 3    mirtazapine (REMERON) 7.5 MG tablet, Take 1 tablet by mouth nightly, Disp: 90 tablet, Rfl: 1    Esomeprazole Magnesium 20 MG TBEC, , Disp: , Rfl:     b complex vitamins capsule, Take 1 capsule by mouth daily, Disp: , Rfl:     Multiple Vitamins-Minerals (SENTRY SENIOR) TABS, Take 1 tablet by mouth daily, Disp: , Rfl:     Omega 3-6-9 Fatty Acids (OMEGA 3-6-9 COMPLEX) CAPS, Take 1 tablet by mouth daily, Disp: , Rfl:     zinc gluconate 50 MG tablet, Take 50 mg by mouth daily, Disp: , Rfl:     Lab Results   Component Value Date    WBC 7.5 05/28/2021    RBC 5.54 05/28/2021    HGB 17.7 05/28/2021    HCT 51.9 05/28/2021    MCV 93.8 05/28/2021    MCH 31.9 05/28/2021    MCHC 34.0 05/28/2021    MPV 9.4 05/28/2021    NEUTOPHILPCT 61.5 05/28/2021    LYMPHOPCT 22.8 05/28/2021    MONOPCT 10.4 05/28/2021    EOSRELPCT 4.5 05/28/2021    BASOPCT 0.8 05/28/2021    NEUTROABS 4.6 05/28/2021    LYMPHSABS 1.7 05/28/2021    MONOSABS 0.8 05/28/2021    EOSABS 0.3 05/28/2021     Lab Results   Component Value Date     07/14/2021    K 4.2 07/14/2021    CL 99 07/14/2021    CO2 23 07/14/2021    ANIONGAP 18 07/14/2021    GLUCOSE 144 07/14/2021    BUN 22 07/14/2021    CREATININE 1.2 07/14/2021    LABGLOM 60 07/14/2021    GFRAA >60 07/14/2021    CALCIUM 9.7 07/14/2021    PROT 7.4 05/28/2021    LABALBU 4.9 07/14/2021    AGRATIO 1.7 05/28/2021    BILITOT 0.6 05/28/2021    ALKPHOS 73 05/28/2021    ALT 71 05/28/2021    AST 52 05/28/2021    GLOB 2.7 05/28/2021     Lab Results   Component Value Date    CHOL 219 05/28/2021    TRIG 422 05/28/2021    HDL 36 05/28/2021    LDLCALC see below 05/28/2021    LABVLDL see below 05/28/2021     Lab Results   Component Value Date    TSHREFLEX 3.28 05/28/2021     Lab Results   Component Value Date    IRON 140 05/28/2021    TIBC 343 05/28/2021    LABIRON 41 05/28/2021     Lab Results   Component Value Date    AVVZNCEJ68 634 05/28/2021    FOLATE >20.00 05/28/2021     Lab Results   Component Value Date    VITD25 30.5 05/28/2021     Lab Results   Component Value Date    LABA1C 6.3 05/28/2021    .1 05/28/2021       Review of Systems   Constitutional: Negative. Negative for chills, fatigue and fever. HENT: Negative. Eyes: Negative. Respiratory: Negative. Negative for cough and shortness of breath. Cardiovascular: Negative. Gastrointestinal: Negative. Endocrine: Negative. Genitourinary: Negative. Musculoskeletal: Negative. Skin: Negative. Allergic/Immunologic: Negative. Neurological: Negative. Hematological: Negative. Psychiatric/Behavioral: Negative. Objective:     Physical Exam  Vitals reviewed. Constitutional:       Appearance: He is well-developed. HENT:      Head: Normocephalic and atraumatic. Eyes:      Conjunctiva/sclera: Conjunctivae normal.      Pupils: Pupils are equal, round, and reactive to light. Pulmonary:      Effort: Pulmonary effort is normal.   Abdominal:      Palpations: Abdomen is soft. Musculoskeletal:         General: Normal range of motion. Cervical back: Normal range of motion and neck supple. Skin:     General: Skin is warm and dry. Neurological:      Mental Status: He is alert and oriented to person, place, and time. Psychiatric:         Behavior: Behavior normal.         Thought Content: Thought content normal.         Judgment: Judgment normal.       Assessment and Plan:   Patient is here for their preoperative education group visit for sleeve gastrectomy. The patient is up 2 lbs today. The patient's current Body mass index is 36.3 kg/m². (9/7/21). He is making good dietary and behavior modifications and is considered to be a good surgical candidate. Patient has a diagnosis of hypertension. Reviewed the importance of checking blood pressure preoperatively and postoperatively. In addition, following up with their PCP for medication adjustments as needed. Discussed the fact that they will be required to crush or open their medications for the first two weeks after surgery and reviewed those medications that can not be crushed. Patient received instructions from the registered dietitian in reference to the two week preoperative diet and the four phases of their postoperative diet. In addition I reviewed these instructions and stressed the importance of following these recommendations for their safety.      Patient completed the preoperative class where they were provided with education related to their bariatric surgery, common surgical complications, medications preoperatively & postoperatively, special concerns related to bariatric surgery postoperatively, vitamin supplementation, patient agreement, PAT & scheduling, hospital course, wellness discovery program and what to do in the case of an emergency postoperatively. The dietitians reviewed all preoperative and postoperative diet instructions. Patient was given the opportunity to ask questions during the group visit and these questions were answered by myself and/or the dietitian. I spent a total of 40 minutes on the day of the visit and over half of that time was spent counseling the patient on proper dietary behaviors, exercise and surgery protocols.

## 2021-09-03 NOTE — PROGRESS NOTES
Patient Name:   Frank Garcia       Type of Surgery:  Sleeve         Date of Surgery:  10/5/21       Start Pre-Op Diet On:  9/22/21       Start Clear Liquids On:  10/4/21       If you are having a gastric bypass, start Bowel Prep between 2-4pm the day prior to surgery. NPO after midnight the night before your surgery! Take morning medications with a small amount of water.     NOTES:_______________________________________  ______________________________________________

## 2021-09-07 ENCOUNTER — OFFICE VISIT (OUTPATIENT)
Dept: BARIATRICS/WEIGHT MGMT | Age: 71
End: 2021-09-07
Payer: MEDICARE

## 2021-09-07 VITALS — HEIGHT: 76 IN | BODY MASS INDEX: 36.31 KG/M2 | WEIGHT: 298.2 LBS

## 2021-09-07 DIAGNOSIS — E66.01 SEVERE OBESITY (BMI 35.0-39.9) WITH COMORBIDITY (HCC): ICD-10-CM

## 2021-09-07 DIAGNOSIS — I10 ESSENTIAL HYPERTENSION: Primary | ICD-10-CM

## 2021-09-07 PROCEDURE — 4040F PNEUMOC VAC/ADMIN/RCVD: CPT | Performed by: NURSE PRACTITIONER

## 2021-09-07 PROCEDURE — G8417 CALC BMI ABV UP PARAM F/U: HCPCS | Performed by: NURSE PRACTITIONER

## 2021-09-07 PROCEDURE — 99214 OFFICE O/P EST MOD 30 MIN: CPT | Performed by: NURSE PRACTITIONER

## 2021-09-07 PROCEDURE — 1036F TOBACCO NON-USER: CPT | Performed by: NURSE PRACTITIONER

## 2021-09-07 PROCEDURE — 1123F ACP DISCUSS/DSCN MKR DOCD: CPT | Performed by: NURSE PRACTITIONER

## 2021-09-07 PROCEDURE — G8427 DOCREV CUR MEDS BY ELIG CLIN: HCPCS | Performed by: NURSE PRACTITIONER

## 2021-09-07 PROCEDURE — 3017F COLORECTAL CA SCREEN DOC REV: CPT | Performed by: NURSE PRACTITIONER

## 2021-09-16 ENCOUNTER — TELEPHONE (OUTPATIENT)
Dept: BARIATRICS/WEIGHT MGMT | Age: 71
End: 2021-09-16

## 2021-09-16 NOTE — TELEPHONE ENCOUNTER
[employer] prior auth submitted sleeve [id number]      Transaction ID: [id number] ID: [id number] Date: 2021-09-16    Plan / Coverage Date  2018-01-01    Transaction Type  Inpatient Authorization    Organization  [facility]    Payer  [facility].   Certificate Information  Reference Number  [id number]    Status  PENDED    Review Reason 1  Requires Medical Review    Message  This case requires further review. You will be contacted if additional information is needed.   Marzena Jackson, [facility]    Requesting Provider     Name  Bhakti Cruz  [id number]    Tax Id  [id number]    Specialty  [id number]    Provider Role  Provider    Address  [address] [place] [address], [address] [place] [address], [facility], [address]    Phone  [phone]  Fax  [phone]      Service Information  Service Type  2 - Surgical    Admission - Discharge Date  2021-10-05    Admission Type  Elective    Admission Source  Clinic or Physician's Office    Diagnosis Code 1   - Morbid (severe) obesity due to excess calories    Diagnosis Code 2  I10 - Essential (primary) hypertension    Diagnosis Code 3   - Unspecified osteoarthritis unspecified site    Procedure Code 1 (CPT/HCPCS)  51425    Rendering Provider/Facility     Provider 1  Name  Bhakti Cruz  [id number]    Tax Id  [id number]    Specialty  [id number]    Provider Role  Operating Physician    Address  [facility] [address], [address] [facility], [facility], [address]    Phone  [phone]  Provider 2  Name  [address] [employer] [facility]  [id number]    Tax Id  [id number]

## 2021-09-29 ENCOUNTER — OFFICE VISIT (OUTPATIENT)
Dept: INTERNAL MEDICINE CLINIC | Age: 71
End: 2021-09-29
Payer: MEDICARE

## 2021-09-29 VITALS
BODY MASS INDEX: 35.44 KG/M2 | HEIGHT: 76 IN | DIASTOLIC BLOOD PRESSURE: 80 MMHG | HEART RATE: 56 BPM | WEIGHT: 291 LBS | SYSTOLIC BLOOD PRESSURE: 140 MMHG

## 2021-09-29 DIAGNOSIS — E66.01 CLASS 2 SEVERE OBESITY DUE TO EXCESS CALORIES WITH SERIOUS COMORBIDITY AND BODY MASS INDEX (BMI) OF 35.0 TO 35.9 IN ADULT (HCC): ICD-10-CM

## 2021-09-29 DIAGNOSIS — Z23 NEED FOR INFLUENZA VACCINATION: ICD-10-CM

## 2021-09-29 DIAGNOSIS — Z01.818 PREOP EXAMINATION: Primary | ICD-10-CM

## 2021-09-29 DIAGNOSIS — I10 ESSENTIAL HYPERTENSION: ICD-10-CM

## 2021-09-29 PROCEDURE — 90694 VACC AIIV4 NO PRSRV 0.5ML IM: CPT | Performed by: INTERNAL MEDICINE

## 2021-09-29 PROCEDURE — G8417 CALC BMI ABV UP PARAM F/U: HCPCS | Performed by: INTERNAL MEDICINE

## 2021-09-29 PROCEDURE — G8427 DOCREV CUR MEDS BY ELIG CLIN: HCPCS | Performed by: INTERNAL MEDICINE

## 2021-09-29 PROCEDURE — 1123F ACP DISCUSS/DSCN MKR DOCD: CPT | Performed by: INTERNAL MEDICINE

## 2021-09-29 PROCEDURE — 1036F TOBACCO NON-USER: CPT | Performed by: INTERNAL MEDICINE

## 2021-09-29 PROCEDURE — G0008 ADMIN INFLUENZA VIRUS VAC: HCPCS | Performed by: INTERNAL MEDICINE

## 2021-09-29 PROCEDURE — 4040F PNEUMOC VAC/ADMIN/RCVD: CPT | Performed by: INTERNAL MEDICINE

## 2021-09-29 PROCEDURE — 3017F COLORECTAL CA SCREEN DOC REV: CPT | Performed by: INTERNAL MEDICINE

## 2021-09-29 PROCEDURE — 99214 OFFICE O/P EST MOD 30 MIN: CPT | Performed by: INTERNAL MEDICINE

## 2021-09-29 NOTE — PROGRESS NOTES
Preoperative Consultation      Melissa Main  YOB: 1950    Date of Service:  9/29/2021    Chief Complaint   Patient presents with   Dorathy Killer     Dr. Ander Beach 10/05/21       This patient presents to the office today for a preoperative consultation at the request of surgeon, Dr. Jhony Littlejohn  To have sleeve gastrectomy      Planned anesthesia:  General  Known anesthesia problems: None  Bleeding risk: Low  Personal or FH of DVT/PE: No  Patient objection to receiving blood products: No    Past Medical History:   Diagnosis Date    Chest pain 1/2/2015    Chronic GERD     ESBL (extended spectrum beta-lactamase) producing bacteria infection 09/11/2016    E.coli ESBL-8/2015    Hepatitis C virus 01/02/2015    says he received treatment    Hx of hepatitis C 1/16/2015    Hyperlipidemia     Hypertension     Hypertension     Hypertension, essential     MDRO (multiple drug resistant organisms) resistance 09/10/2016    Pseudomonas MDRO in foot 6/23/16    MDRO (multiple drug resistant organisms) resistance 9/22/17; 09/08/2017    Pseudomonas MDR in wound    Morbid obesity ([address]     Non-pressure chronic ulcer of other part of left foot limited to breakdown of skin ([address]     Non-pressure chronic ulcer of other part of right foot limited to breakdown of skin ([address]     Obstructive sleep apnea     Peripheral neuropathy 1/2/2015    Pre-operative clearance        Past Surgical History:   Procedure Laterality Date    FOOT SURGERY Bilateral 10/8/2015    left foot bone biopsy X 2, bilat wound debridement, bilat.  amnio graft    FOOT SURGERY Right 09/13/2016    TRANSMETATARSAL AMPUTATION RIGHT FOOT;    FOOT SURGERY Left 09/25/2017    INCISION AND DRAINAGE WITH EXCISION OF ALL NECROTIC SOFT TISSUE AND BONE, LEFT FOOT WITH APPLICATION OF WOUND VAC    OTHER SURGICAL HISTORY  09/11/2016    PARTIAL FIRST RAY TOE AMPUTATION - RIGHT FOOT (VANCOMYCIN    OTHER SURGICAL HISTORY Left 2017     TRANSMETATARSAL AMPUTATION LEFT FOOT ;    OTHER SURGICAL HISTORY Left 2017    INCISION AND DRAINAGE OF NECROTIC TISSUE AND BONE WITH RE-    OTHER SURGICAL HISTORY Left 2017    RESECTION FIRST METATARSAL WITH PARTIAL RESECTION SECOND,    TONSILLECTOMY      UPPER GASTROINTESTINAL ENDOSCOPY N/A 2021    EGD BIOPSY performed by Zan Kurtz DO at [address]   Allergen Reactions    Clindamycin/Lincomycin Rash       Outpatient Medications Marked as Taking for the 21 encounter (Office Visit) with Adrian Jones MD   Medication Sig Dispense Refill    atenolol-chlorthalidone (TENORETIC 100) 100-25 MG per tablet Take 1 tablet by mouth daily 90 tablet 3    Esomeprazole Magnesium 20 MG TBEC          No family history on file. Social History     Socioeconomic History    Marital status:      Spouse name: Not on file    Number of children: Not on file    Years of education: Not on file    Highest education level: Not on file   Occupational History    Not on file   Tobacco Use    Smoking status: Former Smoker     Packs/day: 0.25     Years: 5.00     Pack years: 1.25     Quit date: 2016     Years since quittin.0    Smokeless tobacco: Never Used   Substance and Sexual Activity    Alcohol use: Not Currently     Alcohol/week: 3.0 standard drinks     Types: 3 Shots of liquor per week    Drug use: No    Sexual activity: Yes     Partners: Female   Other Topics Concern    Not on file   Social History Narrative    Not on file     Social Determinants of Health     Financial Resource Strain: Low Risk     Difficulty of Paying Living Expenses: Not hard at all   Food Insecurity: No Food Insecurity    Worried About Running Out of Food in the Last Year: Never true    Juliann of Food in the Last Year: Never true   Transportation Needs: No Transportation Needs    Lack of Transportation (Medical): No    Lack of Transportation (Non-Medical):  No Physical Activity:     Days of Exercise per Week:     Minutes of Exercise per Session:    Stress:     Feeling of Stress :    Social Connections:     Frequency of Communication with Friends and Family:     Frequency of Social Gatherings with Friends and Family:     Attends [employer] Services:     Active Member of Clubs or Organizations:     Attends Club or Organization Meetings:     Marital Status:    Intimate Partner Violence:     Fear of Current or Ex-Partner:     Emotionally Abused:     Physically Abused:     Sexually Abused:        ROS:    Constitutional:  Denies fever or chills   Eyes:  Denies change in visual acuity   HENT:  Denies nasal congestion or sore throat   Respiratory:  Denies cough or shortness of breath   Cardiovascular:  Denies chest pain or edema   GI:  Denies abdominal pain, nausea, vomiting, bloody stools or diarrhea   :  Denies dysuria   Musculoskeletal:  Denies back pain or joint pain   Integument:  Denies rash   Neurologic:  Denies headache, focal weakness or sensory changes   Endocrine:  Denies polyuria or polydipsia   Lymphatic:  Denies swollen glands   Psychiatric:  Denies depression or anxiety       Physical Exam:    Vitals:    09/29/21 0958   Weight: 291 lb (132 kg)   Height: 6' 4\" (1.93 m)      Wt Readings from Last 2 Encounters:   09/29/21 291 lb (132 kg)   09/07/21 298 lb 3.2 oz (135.3 kg)     BP Readings from Last 3 Encounters:   07/12/21 (!) 166/75   07/12/21 (!) 150/80   06/04/21 (!) 149/76        Constitutional:  Well developed, well nourished, no acute distress, non-toxic appearance   Eyes:  PERRL, conjunctiva normal   HENT:  Atraumatic, external ears normal, nose normal, oropharynx moist, no pharyngeal exudates.  Neck- normal range of motion, no tenderness, supple   Respiratory:  No respiratory distress, normal breath sounds, no rales, no wheezing   Cardiovascular:  Normal rate, normal rhythm, no murmurs, no gallops, no rubs   GI:  Soft, nondistended, normal bowel sounds, nontender, no organomegaly, no mass, no rebound, no guarding   :  No costovertebral angle tenderness   Musculoskeletal:  No edema, no tenderness, no deformities. Back- no tenderness  Integument:  Well hydrated, no rash   Lymphatic:  No lymphadenopathy noted   Neurologic:  Alert & oriented x 3, CN 2-12 normal, normal motor function, normal sensory function, no focal deficits noted   Psychiatric:  Speech and behavior appropriate   Extremity Bilateral mid foot amputations       Assessment:       66 y.o. patient with planned surgery as above. Known risk factors for perioperative complications: HTN  No contraindications to planned surgery  Pre-Operative Risk assessment using 2014 ACC/AHA guidelines     Emergent procedure No  Active Cardiac Condition No (decompensated HF, Arrhythmia, MI <3 weeks, severe valve disease)  Risk Level of Procedure Intermediate Risk (intraperitoneal, intrathoracic, HENT, orthopedic, or carotid endarterectomy, etc.)  Revised Cardiac Risk Index Risk factors: None  Measurement of Exercise Tolerance before Surgery >4 Yes    According to the 2014 ACC/AHA pre-operative risk assessment guidelines Lisa Gavin is a low risk for major cardiac complications during a intermediate risk procedure and may continue as planned. Specific medication recommendations are listed below. Medications recommended to continue should be taken with a sip of water even when NPO. Further recommendations from consultants: None    Medication Recommendations:  Betablocker should be continued the day of surgery      Plan:     1. Preoperative workup as follows: History and physcial, blood tesrts, previous nuclear stress test  2. Change in medication regimen before surgery: To continue betablocker and Nexium with sip of water on the day of surgery  3. Prophylaxis for cardiac events with perioperative beta-blockers: On beta blockler  4.  Deep vein thrombosis prophylaxis: As per surgery  Sonia Means

## 2021-09-29 NOTE — TELEPHONE ENCOUNTER
[facility] approval sleeve [id number] including inpatient stay up to 3 days.     Landon Daubs #-[id number]

## 2021-09-30 RX ORDER — SCOLOPAMINE TRANSDERMAL SYSTEM 1 MG/1
1 PATCH, EXTENDED RELEASE TRANSDERMAL ONCE
Status: DISCONTINUED | OUTPATIENT
Start: 2021-09-30 | End: 2021-09-30

## 2021-09-30 NOTE — PROGRESS NOTES
"The patient is given the patient education document:  ""Artificial Tears"" [address] [facility] patients having surgery or anesthesia are required to be Covid tested OR to have been vaccinated at least 14 days prior to your procedure. It is very important to return our call to [phone] and notify the staff of your last vaccination date otherwise you will be required to complete Covid PCR test within the 5-6 days prior to surgery & quarantine. The results will need to be faxed to PreAdmission Testing at [phone]. PRIOR TO PROCEDURE DATE:        1. PLEASE FOLLOW ANY  GUIDELINES/ INSTRUCTIONS PRIOR TO YOUR PROCEDURE AS ADVISED BY YOUR SURGEON. 2. Arrange for someone to drive you home and be with you for the first 24 hours after discharge for your safety after your procedure for which you received sedation. Ensure it is someone we can share information with regarding your discharge. 3. You must contact your surgeon for instructions IF:   You are taking any blood thinners, aspirin, anti-inflammatory or vitamin E.   There is a change in your physical condition such as a cold, fever, rash, cuts, sores or any other infection, especially near your surgical site. 4. Do not drink alcohol the day before or day of your procedure. 5. A Pre-op History and Physical for surgery MUST be completed by your Physician or Urgent Care within 30 days of your procedure date. Please bring a copy with you on the day of your procedure and along with any other testing performed. THE DAY OF YOUR PROCEDURE:  1. Follow instructions for ARRIVAL TIME as DIRECTED BY YOUR SURGEON. 2. Enter the MAIN entrance from [address] [place] [address] and follow the signs to the free "[employer] or [employer] parking (offered free of charge 6am-5pm). 3. Enter the Main Entrance of the hospital (do not enter from the lower level of the parking garage). Upon entrance, check in with the  at the main desk on your left. site will be clipped by a healthcare worker using a special clippers designed to avoid skin irritation. 5. When you are in the operating room, your surgical site will be cleansed with a special soap, and in most cases, you will be given an antibiotic before the surgery begins. What to expect AFTER YOUR PROCEDURE:  1. Immediately following your procedure, your will be taken to the PACU for the first phase of your recovery. Your nurse will help you recover from any potential side effects of anesthesia, such as extreme drowsiness, changes in your vital signs or breathing patterns. Nausea, headache, muscle aches, or sore throat may also occur after anesthesia. Your nurse will help you manage these potential side effects. 2. For comfort and safety, arrange to have someone at home with you for the first 24 hours after discharge. 3. You and your family will be given written instructions about your diet, activity, dressing care, medications, and return visits. 4. Once at home, should issues with nausea, pain, or bleeding occur, or should you notice any signs of infection, you should call your surgeon. 5. Always clean your hands before and after caring for your wound. Do not let your family touch your surgery site without cleaning their hands. 6. Narcotic pain medications can cause significant constipation. You may want to add a stool softener to your postoperative medication schedule or speak to your surgeon on how best to manage this SIDE EFFECT. SPECIAL INSTRUCTIONS NONE    Thank you for allowing us to care for you. We strive to exceed your expectations in the delivery of care and service provided to you and your family. If you need to contact the [place] [address] staff for any reason, please call us at [phone]    Instructions reviewed with patient during preadmission testing phone interview.   Jessica Rios RN.9/30/2021 .2:28 PM      ADDITIONAL EDUCATIONAL INFORMATION REVIEWED PER PHONE WITH YOU AND/OR YOUR FAMILY:     Yes Antibacterial Soap

## 2021-10-04 ENCOUNTER — TELEPHONE (OUTPATIENT)
Dept: BARIATRICS/WEIGHT MGMT | Age: 71
End: 2021-10-04

## 2021-10-04 ENCOUNTER — ANESTHESIA EVENT (OUTPATIENT)
Dept: OPERATING ROOM | Age: 71
DRG: 621 | End: 2021-10-04
Payer: MEDICARE

## 2021-10-04 NOTE — TELEPHONE ENCOUNTER
Spoke with pt to confirm his 7:30 am arrival for his 9:30 am surgery on 10/5/21. Pt confirmed he has completed his pre op diet and is doing his clear liquid diet today and will be NPO after midnight.

## 2021-10-05 ENCOUNTER — HOSPITAL ENCOUNTER (INPATIENT)
Age: 71
LOS: 1 days | Discharge: HOME | DRG: 621 | End: 2021-10-06
Attending: SURGERY | Admitting: SURGERY
Payer: MEDICARE

## 2021-10-05 ENCOUNTER — ANESTHESIA (OUTPATIENT)
Dept: OPERATING ROOM | Age: 71
DRG: 621 | End: 2021-10-05
Payer: MEDICARE

## 2021-10-05 VITALS — TEMPERATURE: 74.5 F | DIASTOLIC BLOOD PRESSURE: 54 MMHG | SYSTOLIC BLOOD PRESSURE: 115 MMHG | OXYGEN SATURATION: 97 %

## 2021-10-05 DIAGNOSIS — E66.01 MORBID OBESITY (HCC): ICD-10-CM

## 2021-10-05 DIAGNOSIS — R10.9 ACUTE POSTOPERATIVE PAIN OF ABDOMEN: Primary | ICD-10-CM

## 2021-10-05 DIAGNOSIS — I10 HYPERTENSION, UNSPECIFIED TYPE: ICD-10-CM

## 2021-10-05 DIAGNOSIS — G89.18 ACUTE POSTOPERATIVE PAIN OF ABDOMEN: Primary | ICD-10-CM

## 2021-10-05 DIAGNOSIS — M19.90 OSTEOARTHRITIS, UNSPECIFIED OSTEOARTHRITIS TYPE, UNSPECIFIED SITE: ICD-10-CM

## 2021-10-05 PROBLEM — K44.9 HIATAL HERNIA: Status: ACTIVE | Noted: 2021-10-05

## 2021-10-05 LAB
ABO/RH: NORMAL
ANTIBODY SCREEN: NORMAL

## 2021-10-05 PROCEDURE — 2580000003 HC RX 258: Performed by: SURGERY

## 2021-10-05 PROCEDURE — 43281 LAP PARAESOPHAG HERN REPAIR: CPT | Performed by: SURGERY

## 2021-10-05 PROCEDURE — 6360000002 HC RX W HCPCS: Performed by: SURGERY

## 2021-10-05 PROCEDURE — 2720000010 HC SURG SUPPLY STERILE: Performed by: SURGERY

## 2021-10-05 PROCEDURE — 8E0W4CZ ROBOTIC ASSISTED PROCEDURE OF TRUNK REGION, PERCUTANEOUS ENDOSCOPIC APPROACH: ICD-10-PCS | Performed by: SURGERY

## 2021-10-05 PROCEDURE — 2500000003 HC RX 250 WO HCPCS: Performed by: SURGERY

## 2021-10-05 PROCEDURE — 1200000000 HC SEMI PRIVATE

## 2021-10-05 PROCEDURE — 3600000019 HC SURGERY ROBOT ADDTL 15MIN: Performed by: SURGERY

## 2021-10-05 PROCEDURE — 6360000002 HC RX W HCPCS: Performed by: NURSE ANESTHETIST, CERTIFIED REGISTERED

## 2021-10-05 PROCEDURE — 3700000001 HC ADD 15 MINUTES (ANESTHESIA): Performed by: SURGERY

## 2021-10-05 PROCEDURE — 7100000001 HC PACU RECOVERY - ADDTL 15 MIN: Performed by: SURGERY

## 2021-10-05 PROCEDURE — 3700000000 HC ANESTHESIA ATTENDED CARE: Performed by: SURGERY

## 2021-10-05 PROCEDURE — 6360000002 HC RX W HCPCS: Performed by: ANESTHESIOLOGY

## 2021-10-05 PROCEDURE — 6370000000 HC RX 637 (ALT 250 FOR IP)

## 2021-10-05 PROCEDURE — 2709999900 HC NON-CHARGEABLE SUPPLY: Performed by: SURGERY

## 2021-10-05 PROCEDURE — 86900 BLOOD TYPING SEROLOGIC ABO: CPT

## 2021-10-05 PROCEDURE — 86850 RBC ANTIBODY SCREEN: CPT

## 2021-10-05 PROCEDURE — 0DB64Z3 EXCISION OF STOMACH, PERCUTANEOUS ENDOSCOPIC APPROACH, VERTICAL: ICD-10-PCS | Performed by: SURGERY

## 2021-10-05 PROCEDURE — 6370000000 HC RX 637 (ALT 250 FOR IP): Performed by: SURGERY

## 2021-10-05 PROCEDURE — 88307 TISSUE EXAM BY PATHOLOGIST: CPT

## 2021-10-05 PROCEDURE — 7100000000 HC PACU RECOVERY - FIRST 15 MIN: Performed by: SURGERY

## 2021-10-05 PROCEDURE — 2580000003 HC RX 258: Performed by: ANESTHESIOLOGY

## 2021-10-05 PROCEDURE — C9113 INJ PANTOPRAZOLE SODIUM, VIA: HCPCS | Performed by: SURGERY

## 2021-10-05 PROCEDURE — 86901 BLOOD TYPING SEROLOGIC RH(D): CPT

## 2021-10-05 PROCEDURE — 0BQT4ZZ REPAIR DIAPHRAGM, PERCUTANEOUS ENDOSCOPIC APPROACH: ICD-10-PCS | Performed by: SURGERY

## 2021-10-05 PROCEDURE — 3600000009 HC SURGERY ROBOT BASE: Performed by: SURGERY

## 2021-10-05 PROCEDURE — 2500000003 HC RX 250 WO HCPCS: Performed by: NURSE ANESTHETIST, CERTIFIED REGISTERED

## 2021-10-05 PROCEDURE — 43775 LAP SLEEVE GASTRECTOMY: CPT | Performed by: SURGERY

## 2021-10-05 PROCEDURE — 2580000003 HC RX 258: Performed by: NURSE ANESTHETIST, CERTIFIED REGISTERED

## 2021-10-05 PROCEDURE — S2900 ROBOTIC SURGICAL SYSTEM: HCPCS | Performed by: SURGERY

## 2021-10-05 RX ORDER — LIDOCAINE HYDROCHLORIDE 20 MG/ML
INJECTION, SOLUTION INFILTRATION; PERINEURAL PRN
Status: DISCONTINUED | OUTPATIENT
Start: 2021-10-05 | End: 2021-10-05 | Stop reason: SDUPTHER

## 2021-10-05 RX ORDER — PREGABALIN 150 MG/1
150 CAPSULE ORAL ONCE
Status: COMPLETED | OUTPATIENT
Start: 2021-10-05 | End: 2021-10-05

## 2021-10-05 RX ORDER — PROPOFOL 10 MG/ML
INJECTION, EMULSION INTRAVENOUS PRN
Status: DISCONTINUED | OUTPATIENT
Start: 2021-10-05 | End: 2021-10-05 | Stop reason: SDUPTHER

## 2021-10-05 RX ORDER — PROCHLORPERAZINE EDISYLATE 5 MG/ML
10 INJECTION INTRAMUSCULAR; INTRAVENOUS EVERY 6 HOURS PRN
Status: DISCONTINUED | OUTPATIENT
Start: 2021-10-05 | End: 2021-10-06 | Stop reason: HOSPADM

## 2021-10-05 RX ORDER — PANTOPRAZOLE SODIUM 40 MG/10ML
40 INJECTION, POWDER, LYOPHILIZED, FOR SOLUTION INTRAVENOUS DAILY
Status: DISCONTINUED | OUTPATIENT
Start: 2021-10-05 | End: 2021-10-06 | Stop reason: HOSPADM

## 2021-10-05 RX ORDER — SODIUM CHLORIDE 0.9 % (FLUSH) 0.9 %
10 SYRINGE (ML) INJECTION PRN
Status: DISCONTINUED | OUTPATIENT
Start: 2021-10-05 | End: 2021-10-05 | Stop reason: HOSPADM

## 2021-10-05 RX ORDER — LABETALOL HYDROCHLORIDE 5 MG/ML
5 INJECTION, SOLUTION INTRAVENOUS EVERY 10 MIN PRN
Status: DISCONTINUED | OUTPATIENT
Start: 2021-10-05 | End: 2021-10-05 | Stop reason: HOSPADM

## 2021-10-05 RX ORDER — ONDANSETRON 2 MG/ML
INJECTION INTRAMUSCULAR; INTRAVENOUS PRN
Status: DISCONTINUED | OUTPATIENT
Start: 2021-10-05 | End: 2021-10-05 | Stop reason: SDUPTHER

## 2021-10-05 RX ORDER — ROCURONIUM BROMIDE 10 MG/ML
INJECTION, SOLUTION INTRAVENOUS PRN
Status: DISCONTINUED | OUTPATIENT
Start: 2021-10-05 | End: 2021-10-05 | Stop reason: SDUPTHER

## 2021-10-05 RX ORDER — SODIUM CHLORIDE, SODIUM LACTATE, POTASSIUM CHLORIDE, CALCIUM CHLORIDE 600; 310; 30; 20 MG/100ML; MG/100ML; MG/100ML; MG/100ML
INJECTION, SOLUTION INTRAVENOUS CONTINUOUS PRN
Status: DISCONTINUED | OUTPATIENT
Start: 2021-10-05 | End: 2021-10-05 | Stop reason: SDUPTHER

## 2021-10-05 RX ORDER — DIPHENHYDRAMINE HYDROCHLORIDE 50 MG/ML
12.5 INJECTION INTRAMUSCULAR; INTRAVENOUS
Status: DISCONTINUED | OUTPATIENT
Start: 2021-10-05 | End: 2021-10-05 | Stop reason: HOSPADM

## 2021-10-05 RX ORDER — FENTANYL CITRATE 50 UG/ML
50 INJECTION, SOLUTION INTRAMUSCULAR; INTRAVENOUS EVERY 5 MIN PRN
Status: DISCONTINUED | OUTPATIENT
Start: 2021-10-05 | End: 2021-10-05 | Stop reason: HOSPADM

## 2021-10-05 RX ORDER — ONDANSETRON 2 MG/ML
4 INJECTION INTRAMUSCULAR; INTRAVENOUS EVERY 6 HOURS PRN
Status: DISCONTINUED | OUTPATIENT
Start: 2021-10-05 | End: 2021-10-06 | Stop reason: HOSPADM

## 2021-10-05 RX ORDER — SODIUM CHLORIDE 9 MG/ML
25 INJECTION, SOLUTION INTRAVENOUS PRN
Status: DISCONTINUED | OUTPATIENT
Start: 2021-10-05 | End: 2021-10-05 | Stop reason: HOSPADM

## 2021-10-05 RX ORDER — SODIUM CHLORIDE, SODIUM LACTATE, POTASSIUM CHLORIDE, CALCIUM CHLORIDE 600; 310; 30; 20 MG/100ML; MG/100ML; MG/100ML; MG/100ML
INJECTION, SOLUTION INTRAVENOUS CONTINUOUS PRN
Status: COMPLETED | OUTPATIENT
Start: 2021-10-05 | End: 2021-10-05

## 2021-10-05 RX ORDER — SODIUM CHLORIDE 0.9 % (FLUSH) 0.9 %
5-40 SYRINGE (ML) INJECTION EVERY 12 HOURS SCHEDULED
Status: DISCONTINUED | OUTPATIENT
Start: 2021-10-05 | End: 2021-10-06 | Stop reason: HOSPADM

## 2021-10-05 RX ORDER — ONDANSETRON 2 MG/ML
4 INJECTION INTRAMUSCULAR; INTRAVENOUS
Status: DISCONTINUED | OUTPATIENT
Start: 2021-10-05 | End: 2021-10-05 | Stop reason: HOSPADM

## 2021-10-05 RX ORDER — NALOXONE HYDROCHLORIDE 0.4 MG/ML
0.4 INJECTION, SOLUTION INTRAMUSCULAR; INTRAVENOUS; SUBCUTANEOUS PRN
Status: DISCONTINUED | OUTPATIENT
Start: 2021-10-05 | End: 2021-10-06

## 2021-10-05 RX ORDER — METOCLOPRAMIDE HYDROCHLORIDE 5 MG/ML
10 INJECTION INTRAMUSCULAR; INTRAVENOUS EVERY 6 HOURS PRN
Status: DISCONTINUED | OUTPATIENT
Start: 2021-10-05 | End: 2021-10-06 | Stop reason: HOSPADM

## 2021-10-05 RX ORDER — SODIUM CHLORIDE 0.9 % (FLUSH) 0.9 %
10 SYRINGE (ML) INJECTION EVERY 12 HOURS SCHEDULED
Status: DISCONTINUED | OUTPATIENT
Start: 2021-10-05 | End: 2021-10-05 | Stop reason: HOSPADM

## 2021-10-05 RX ORDER — SODIUM CHLORIDE 9 MG/ML
INJECTION, SOLUTION INTRAVENOUS CONTINUOUS
Status: DISCONTINUED | OUTPATIENT
Start: 2021-10-05 | End: 2021-10-06

## 2021-10-05 RX ORDER — HYDRALAZINE HYDROCHLORIDE 20 MG/ML
5 INJECTION INTRAMUSCULAR; INTRAVENOUS EVERY 10 MIN PRN
Status: DISCONTINUED | OUTPATIENT
Start: 2021-10-05 | End: 2021-10-05 | Stop reason: HOSPADM

## 2021-10-05 RX ORDER — ACETAMINOPHEN 160 MG/5ML
SOLUTION ORAL
Status: COMPLETED
Start: 2021-10-05 | End: 2021-10-05

## 2021-10-05 RX ORDER — ACETAMINOPHEN 160 MG/5ML
650 SOLUTION ORAL ONCE
Status: COMPLETED | OUTPATIENT
Start: 2021-10-05 | End: 2021-10-05

## 2021-10-05 RX ORDER — SODIUM CHLORIDE, SODIUM LACTATE, POTASSIUM CHLORIDE, CALCIUM CHLORIDE 600; 310; 30; 20 MG/100ML; MG/100ML; MG/100ML; MG/100ML
INJECTION, SOLUTION INTRAVENOUS CONTINUOUS
Status: DISCONTINUED | OUTPATIENT
Start: 2021-10-05 | End: 2021-10-06

## 2021-10-05 RX ORDER — FENTANYL CITRATE 50 UG/ML
INJECTION, SOLUTION INTRAMUSCULAR; INTRAVENOUS PRN
Status: DISCONTINUED | OUTPATIENT
Start: 2021-10-05 | End: 2021-10-05 | Stop reason: SDUPTHER

## 2021-10-05 RX ORDER — SCOLOPAMINE TRANSDERMAL SYSTEM 1 MG/1
1 PATCH, EXTENDED RELEASE TRANSDERMAL
Status: DISCONTINUED | OUTPATIENT
Start: 2021-10-05 | End: 2021-10-06 | Stop reason: HOSPADM

## 2021-10-05 RX ORDER — PROMETHAZINE HYDROCHLORIDE 25 MG/ML
6.25 INJECTION, SOLUTION INTRAMUSCULAR; INTRAVENOUS
Status: DISCONTINUED | OUTPATIENT
Start: 2021-10-05 | End: 2021-10-05 | Stop reason: HOSPADM

## 2021-10-05 RX ORDER — ENALAPRILAT 2.5 MG/2ML
1.25 INJECTION INTRAVENOUS EVERY 6 HOURS PRN
Status: DISCONTINUED | OUTPATIENT
Start: 2021-10-05 | End: 2021-10-06 | Stop reason: HOSPADM

## 2021-10-05 RX ORDER — SCOLOPAMINE TRANSDERMAL SYSTEM 1 MG/1
1 PATCH, EXTENDED RELEASE TRANSDERMAL ONCE
Status: DISCONTINUED | OUTPATIENT
Start: 2021-10-05 | End: 2021-10-06 | Stop reason: HOSPADM

## 2021-10-05 RX ORDER — APREPITANT 40 MG/1
80 CAPSULE ORAL ONCE
Status: COMPLETED | OUTPATIENT
Start: 2021-10-05 | End: 2021-10-05

## 2021-10-05 RX ORDER — SODIUM CHLORIDE 9 MG/ML
10 INJECTION INTRAVENOUS DAILY
Status: DISCONTINUED | OUTPATIENT
Start: 2021-10-05 | End: 2021-10-06 | Stop reason: HOSPADM

## 2021-10-05 RX ORDER — SODIUM CHLORIDE 9 MG/ML
25 INJECTION, SOLUTION INTRAVENOUS PRN
Status: DISCONTINUED | OUTPATIENT
Start: 2021-10-05 | End: 2021-10-06 | Stop reason: HOSPADM

## 2021-10-05 RX ORDER — METHYLENE BLUE 10 MG/ML
INJECTION INTRAVENOUS PRN
Status: DISCONTINUED | OUTPATIENT
Start: 2021-10-05 | End: 2021-10-05 | Stop reason: HOSPADM

## 2021-10-05 RX ORDER — SODIUM CHLORIDE 0.9 % (FLUSH) 0.9 %
5-40 SYRINGE (ML) INJECTION PRN
Status: DISCONTINUED | OUTPATIENT
Start: 2021-10-05 | End: 2021-10-06 | Stop reason: HOSPADM

## 2021-10-05 RX ORDER — BUPIVACAINE HYDROCHLORIDE 5 MG/ML
INJECTION, SOLUTION EPIDURAL; INTRACAUDAL PRN
Status: DISCONTINUED | OUTPATIENT
Start: 2021-10-05 | End: 2021-10-05 | Stop reason: HOSPADM

## 2021-10-05 RX ADMIN — PANTOPRAZOLE SODIUM 40 MG: 40 INJECTION, POWDER, FOR SOLUTION INTRAVENOUS at 14:50

## 2021-10-05 RX ADMIN — PREGABALIN 150 MG: 150 CAPSULE ORAL at 08:06

## 2021-10-05 RX ADMIN — FENTANYL CITRATE 100 MCG: 50 INJECTION, SOLUTION INTRAMUSCULAR; INTRAVENOUS at 08:52

## 2021-10-05 RX ADMIN — ONDANSETRON 4 MG: 2 INJECTION INTRAMUSCULAR; INTRAVENOUS at 10:44

## 2021-10-05 RX ADMIN — ENOXAPARIN SODIUM 40 MG: 40 INJECTION SUBCUTANEOUS at 08:05

## 2021-10-05 RX ADMIN — PROPOFOL 100 MG: 10 INJECTION, EMULSION INTRAVENOUS at 09:00

## 2021-10-05 RX ADMIN — Medication 10 ML: at 14:50

## 2021-10-05 RX ADMIN — SODIUM CHLORIDE, POTASSIUM CHLORIDE, SODIUM LACTATE AND CALCIUM CHLORIDE: 600; 310; 30; 20 INJECTION, SOLUTION INTRAVENOUS at 08:40

## 2021-10-05 RX ADMIN — APREPITANT 80 MG: 40 CAPSULE ORAL at 08:05

## 2021-10-05 RX ADMIN — SODIUM CHLORIDE: 9 INJECTION, SOLUTION INTRAVENOUS at 14:34

## 2021-10-05 RX ADMIN — ENOXAPARIN SODIUM 40 MG: 40 INJECTION SUBCUTANEOUS at 23:55

## 2021-10-05 RX ADMIN — LIDOCAINE HYDROCHLORIDE 100 MG: 20 INJECTION, SOLUTION INFILTRATION; PERINEURAL at 09:00

## 2021-10-05 RX ADMIN — CEFAZOLIN SODIUM 3000 MG: 1 POWDER, FOR SOLUTION INTRAMUSCULAR; INTRAVENOUS at 08:52

## 2021-10-05 RX ADMIN — ACETAMINOPHEN 650 MG: 650 SOLUTION ORAL at 08:06

## 2021-10-05 RX ADMIN — HYDROMORPHONE HYDROCHLORIDE 0.5 MG: 1 INJECTION, SOLUTION INTRAMUSCULAR; INTRAVENOUS; SUBCUTANEOUS at 11:46

## 2021-10-05 RX ADMIN — ONDANSETRON 4 MG: 2 INJECTION INTRAMUSCULAR; INTRAVENOUS at 16:16

## 2021-10-05 RX ADMIN — HYDROMORPHONE HYDROCHLORIDE: 10 INJECTION, SOLUTION INTRAMUSCULAR; INTRAVENOUS; SUBCUTANEOUS at 13:05

## 2021-10-05 RX ADMIN — ROCURONIUM BROMIDE 100 MG: 10 INJECTION INTRAVENOUS at 09:00

## 2021-10-05 RX ADMIN — SODIUM CHLORIDE, SODIUM LACTATE, POTASSIUM CHLORIDE, AND CALCIUM CHLORIDE: .6; .31; .03; .02 INJECTION, SOLUTION INTRAVENOUS at 08:52

## 2021-10-05 RX ADMIN — SUGAMMADEX 200 MG: 100 INJECTION, SOLUTION INTRAVENOUS at 10:44

## 2021-10-05 RX ADMIN — ACETAMINOPHEN 650 MG: 160 SOLUTION ORAL at 08:06

## 2021-10-05 RX ADMIN — SODIUM CHLORIDE, SODIUM LACTATE, POTASSIUM CHLORIDE, AND CALCIUM CHLORIDE: .6; .31; .03; .02 INJECTION, SOLUTION INTRAVENOUS at 10:44

## 2021-10-05 ASSESSMENT — PULMONARY FUNCTION TESTS
PIF_VALUE: 23
PIF_VALUE: 22
PIF_VALUE: 23
PIF_VALUE: 23
PIF_VALUE: 15
PIF_VALUE: 22
PIF_VALUE: 1
PIF_VALUE: 23
PIF_VALUE: 23
PIF_VALUE: 21
PIF_VALUE: 22
PIF_VALUE: 1
PIF_VALUE: 23
PIF_VALUE: 15
PIF_VALUE: 1
PIF_VALUE: 22
PIF_VALUE: 22
PIF_VALUE: 21
PIF_VALUE: 21
PIF_VALUE: 23
PIF_VALUE: 22
PIF_VALUE: 21
PIF_VALUE: 0
PIF_VALUE: 23
PIF_VALUE: 3
PIF_VALUE: 23
PIF_VALUE: 21
PIF_VALUE: 1
PIF_VALUE: 16
PIF_VALUE: 21
PIF_VALUE: 15
PIF_VALUE: 22
PIF_VALUE: 22
PIF_VALUE: 21
PIF_VALUE: 16
PIF_VALUE: 15
PIF_VALUE: 3
PIF_VALUE: 23
PIF_VALUE: 2
PIF_VALUE: 22
PIF_VALUE: 15
PIF_VALUE: 22
PIF_VALUE: 15
PIF_VALUE: 15
PIF_VALUE: 16
PIF_VALUE: 23
PIF_VALUE: 23
PIF_VALUE: 21
PIF_VALUE: 22
PIF_VALUE: 23
PIF_VALUE: 23
PIF_VALUE: 22
PIF_VALUE: 3
PIF_VALUE: 23
PIF_VALUE: 23
PIF_VALUE: 24
PIF_VALUE: 18
PIF_VALUE: 23
PIF_VALUE: 23
PIF_VALUE: 19
PIF_VALUE: 3
PIF_VALUE: 15
PIF_VALUE: 15
PIF_VALUE: 26
PIF_VALUE: 22
PIF_VALUE: 17
PIF_VALUE: 22
PIF_VALUE: 23
PIF_VALUE: 21
PIF_VALUE: 23
PIF_VALUE: 21
PIF_VALUE: 23
PIF_VALUE: 22
PIF_VALUE: 23
PIF_VALUE: 21
PIF_VALUE: 6
PIF_VALUE: 21
PIF_VALUE: 23
PIF_VALUE: 23
PIF_VALUE: 22
PIF_VALUE: 15
PIF_VALUE: 23
PIF_VALUE: 22
PIF_VALUE: 23
PIF_VALUE: 23
PIF_VALUE: 22
PIF_VALUE: 15
PIF_VALUE: 23
PIF_VALUE: 22
PIF_VALUE: 21
PIF_VALUE: 3
PIF_VALUE: 15
PIF_VALUE: 22
PIF_VALUE: 15
PIF_VALUE: 18
PIF_VALUE: 22
PIF_VALUE: 23
PIF_VALUE: 23
PIF_VALUE: 15
PIF_VALUE: 19
PIF_VALUE: 23
PIF_VALUE: 22
PIF_VALUE: 1
PIF_VALUE: 22
PIF_VALUE: 23
PIF_VALUE: 15
PIF_VALUE: 23
PIF_VALUE: 3
PIF_VALUE: 23
PIF_VALUE: 21
PIF_VALUE: 23
PIF_VALUE: 39
PIF_VALUE: 22
PIF_VALUE: 22
PIF_VALUE: 4
PIF_VALUE: 23
PIF_VALUE: 0
PIF_VALUE: 23

## 2021-10-05 ASSESSMENT — PAIN SCALES - GENERAL
PAINLEVEL_OUTOF10: 0
PAINLEVEL_OUTOF10: 0
PAINLEVEL_OUTOF10: 10
PAINLEVEL_OUTOF10: 5
PAINLEVEL_OUTOF10: 0

## 2021-10-05 ASSESSMENT — PAIN - FUNCTIONAL ASSESSMENT: PAIN_FUNCTIONAL_ASSESSMENT: 0-10

## 2021-10-05 NOTE — OP NOTE
[facility]) Physicians   [employer]              Procedure Note    Indications: The patient was evaluated by our multidisciplinary team and was found to be a good candidate for weight loss surgery. BMI 36.03    Pre-operative Diagnosis:   Patient Active Problem List   Diagnosis    Hx of hepatitis C    Cellulitis of foot, left    Essential hypertension    Idiopathic peripheral neuropathy    Toe osteomyelitis, right (HCC)    Chronic ulcer of left ankle with fat layer exposed ([address]    Chronic osteomyelitis of left foot with draining sinus (HCC)    Multiple drug resistant organism (MDRO) culture positive    Primary osteoarthritis of left knee    Chronic pain of left knee    Severe obesity (BMI 35.0-39. 9) with comorbidity ([address]    Severe obesity (BMI 35.0-35.9 with comorbidity) ([address]    Hiatal hernia         Post-operative Diagnosis:   Same      Procedure:    - Robotic Sleeve Gastrectomy  - Robotic Hiatal Hernia Repair      Surgeon: Oleksandr Villafuerte DO    Anesthesia: General endotracheal anesthesia        Procedure Details   The patient was seen again in the Holding Room. The risks, benefits, complications, treatment options, and expected outcomes were discussed with the patient and/or family. The possibilities of reaction to medication, pulmonary aspiration, perforation of viscus, bleeding, recurrent infection, strictures, leaks, failure to lose weight, regaining weight,  the need for additional procedures, failure to diagnose a condition, and creating a complication requiring transfusion or operation were discussed. There was concurrence with the proposed plan and informed consent was obtained. The site of surgery was properly noted/marked. The patient was taken to Operating Room, identified as Sendyvianca Mcduffie and the procedure verified as Laparoscopic Sleeve Gastrectomy. A Time Out was held and the above information confirmed.     The patient was placed in the supine position and general anesthesia was induced, along with placement of orogastric tube and SCD's. Lovenox SQ given pre-operatively. IV Antibiotics given pre-operatively. All pressure points were padded properly. A left upper quadrant incision was made and a veres needle was inserted after confirming with saline drop test.  After adequate pneumoperitoneum was obtained, a 5 mm trocar was inserted under direct vision and there was no injury on initial trocar placement. Additional ports were placed in the standard positions under direct vision. The abdomen was initially explored and no abnormalities were identified. A liver retractor was inserted through a small incision in the upper midline, lifted the liver upward, and was then secured to the OR table. The pylorus was identified and measurement was taken approximately 4 cm from the pylorus along the greater curvature of the stomach. The vessel sealer was used to take down the attachments and short gastric vessels along the greater curve of the stomach. This was continued until all attachments were taken down and continued to the gastro-esophageal junction. A 36 Turkish dilator was advanced along the lesser curvature and into the pylorus. A large hiatal hernia was identified and decision was made to repair this to help ensure sleeve would not migrate into the chest and prevent de mari reflux. Began the 360-degree dissection by dividing the pars flaccida and mobilizing the esophagus at the right alexa, identifying the junction with the left alexa lateral to the esophagus. I then divided the phrenoesophageal membrane and the esophagus was freed from both crura and hiatal hernia sac was completely dissected off of the distal esophagus. The dissection was done high into the mediastinum, identifying the aorta as well as the inferior pulmonary vein with complete reduction. The hiatal hernia sac was completely freed off of the left and right parietal pleura and excised.   The esophagus was now mobilized at the hiatus and hernia sac was estimated to be about 4cm. The crura was then closed posterior to the esophagus with multiple interrupted 0 ethibond. There was approximately 4 cm of Intraabdominal esophagus. Vagus nerves were protected and away from dissection. Made sure it was not too tight on the esophagus and the Boogie passed easily through it. A stapler was fired along the dilator to create an appropriate sized gastric sleeve pouch. Green firing followed by blue firings were used to create the sleeve. The staple line looked very healthy and no bleeding from staple line. The stomach was confirmed to be completely divided with uniform shape,  no twist in the sleeve and wide patent incisura. A 2-0 vicryl suture was used to over-sew and imbricate the sleeve staple line. The staple line was completely over-sewn over dilator. The stomach distal to the staple line was clamped and methylene blue saline was injected under pressure confirming no obstruction and no leak. The abdomen was carefully inspected and there was no bleeding or any other abnormality. The stomach was brought out through the RUQ incision. Hemostasis was confirmed. The 12 port sites was closed using 0.0 Vicryl  suture at the level of the fascia. The trocar site skin wounds were closed using 4.0 Vicryl after copious Irrigation of the wounds. Local Anesthetic used at port sites then Dermabond applied. Instrument, sponge, and needle counts were correct at the conclusion of the case. Findings:  Severe Obesity, large hiatal hernia    Estimated Blood Loss:  Minimal           Drains: none           Specimens: Stomach (Subtotal)            Complications:  None; patient tolerated the procedure well. Disposition: PACU - hemodynamically stable. Condition: stable    Attending Attestation: I was present and scrubbed for the entire procedure.

## 2021-10-05 NOTE — PROGRESS NOTES
Pt admitted to PACU s/p ROBOTIC SLEEVE GASTRECTOMY; ROBOTIC HIATAL HERNIA REPAIR.  Pt very drowsy, PO 94%  w oral airway in place /4 L o2 mask - abd w 6 lap sites and binder on

## 2021-10-05 NOTE — PROGRESS NOTES
PACU Transfer Note    Vitals:    10/05/21 1400   BP: (!) 142/69   Pulse: 57   Resp: 14   Temp: 97 °F (36.1 °C)   SpO2:      PO 94%    In: 1000 [I.V.:900]  Out: 15     Pain assessment:   Pain Level: 0    Report given to Receiving unit RN.    10/5/2021 2:12 PM

## 2021-10-05 NOTE — ANESTHESIA POSTPROCEDURE EVALUATION
Department of Anesthesiology  Postprocedure Note    Patient: Tommy Houston  MRN: [id number]  YOB: 1950  Date of evaluation: 10/5/2021  Time:  1:24 PM     Procedure Summary     Date: 10/05/21 Room / Location: [address] [place] [address]    Anesthesia Start: 6827 Anesthesia Stop: 1104    Procedure: ROBOTIC SLEEVE GASTRECTOMY; ROBOTIC HIATAL HERNIA REPAIR (N/A Abdomen) Diagnosis:       Morbid obesity ([address]      Hypertension, unspecified type      Osteoarthritis, unspecified osteoarthritis type, unspecified site      (Morbid obesity ([address] [E66.01] Hypertension, [I10], Osteoarthritis, [M19.90])    Surgeons: Patrick Suarez DO Responsible Provider: Khushi Lazo DO    Anesthesia Type: general ASA Status: 2          Anesthesia Type: general    Meenu Phase I: Meenu Score: 6    Meenu Phase II:      Last vitals: Reviewed and per EMR flowsheets.        Anesthesia Post Evaluation    Patient location during evaluation: bedside  Patient participation: complete - patient participated  Level of consciousness: awake and alert  Airway patency: patent  Nausea & Vomiting: no nausea and no vomiting  Complications: no  Cardiovascular status: hemodynamically stable  Respiratory status: acceptable  Hydration status: stable

## 2021-10-05 NOTE — H&P
Terri Weldon    [id number]  [facility] Same Day Surgery Update H & P  Department of General Surgery   Surgical Service   Pre-operative History and Physical  Last H & P within the last 30 days. DIAGNOSIS:   Morbid obesity ([address] [E66.01]  Hypertension, unspecified type [I10]  Osteoarthritis, unspecified osteoarthritis type, unspecified site [M19.90]    Procedure(s):  ROBOTIC ASSISTED LAPAROSCOPIC SLEEVE GASTRECTOMY    History obtained from: Patient interview and EHR     HISTORY OF PRESENT ILLNESS:   Patient is an obese (Body mass index is 33.72 kg/m².), 66 y.o. male who presents today for the above procedure. Patient with a history of multiple weight loss attempts without long term success. Covid 19:  Patient denies fever, chills, worsening cough, or known exposure to Covid-19. Past Medical History:        Diagnosis Date    Chest pain 1/2/2015    Chronic GERD     ESBL (extended spectrum beta-lactamase) producing bacteria infection 09/11/2016    E.coli ESBL-8/2015    Hepatitis C virus 01/02/2015    says he received treatment    Hx of hepatitis C 1/16/2015    Hyperlipidemia     Hypertension     Hypertension     Hypertension, essential     MDRO (multiple drug resistant organisms) resistance 09/10/2016    Pseudomonas MDRO in foot 6/23/16    MDRO (multiple drug resistant organisms) resistance 9/22/17; 09/08/2017    Pseudomonas MDR in wound    Morbid obesity ([address]     Non-pressure chronic ulcer of other part of left foot limited to breakdown of skin ([address]     Non-pressure chronic ulcer of other part of right foot limited to breakdown of skin ([address]     Obstructive sleep apnea     Peripheral neuropathy 1/2/2015    Pre-operative clearance      Past Surgical History:        Procedure Laterality Date    FOOT SURGERY Bilateral 10/8/2015    left foot bone biopsy X 2, bilat wound debridement, bilat.  amnio graft    FOOT SURGERY Right 09/13/2016    TRANSMETATARSAL AMPUTATION RIGHT FOOT;    FOOT SURGERY Left 2017    INCISION AND DRAINAGE WITH EXCISION OF ALL NECROTIC SOFT TISSUE AND BONE, LEFT FOOT WITH APPLICATION OF WOUND VAC    OTHER SURGICAL HISTORY  2016    PARTIAL FIRST RAY TOE AMPUTATION - RIGHT FOOT (VANCOMYCIN    OTHER SURGICAL HISTORY Left 2017     TRANSMETATARSAL AMPUTATION LEFT FOOT ;    OTHER SURGICAL HISTORY Left 2017    INCISION AND DRAINAGE OF NECROTIC TISSUE AND BONE WITH RE-    OTHER SURGICAL HISTORY Left 2017    RESECTION FIRST METATARSAL WITH PARTIAL RESECTION SECOND,    TONSILLECTOMY      UPPER GASTROINTESTINAL ENDOSCOPY N/A 2021    EGD BIOPSY performed by Camila North DO at [facility]     Past Social History:  Social History     Socioeconomic History    Marital status:      Spouse name: None    Number of children: None    Years of education: None    Highest education level: None   Occupational History    None   Tobacco Use    Smoking status: Former Smoker     Packs/day: 0.25     Years: 5.00     Pack years: 1.25     Quit date: 2016     Years since quittin.0    Smokeless tobacco: Never Used   Substance and Sexual Activity    Alcohol use: Not Currently     Alcohol/week: 3.0 standard drinks     Types: 3 Shots of liquor per week    Drug use: No    Sexual activity: Yes     Partners: Female   Other Topics Concern    None   Social History Narrative    None     Social Determinants of Health     Financial Resource Strain: Low Risk     Difficulty of Paying Living Expenses: Not hard at all   Food Insecurity: No Food Insecurity    Worried About Running Out of Food in the Last Year: Never true    Juliann of Food in the Last Year: Never true   Transportation Needs: No Transportation Needs    Lack of Transportation (Medical): No    Lack of Transportation (Non-Medical):  No   Physical Activity:     Days of Exercise per Week:     Minutes of Exercise per Session:    Stress:     Feeling of Stress :    Social Connections:     Frequency of Communication with Friends and Family:     Frequency of Social Gatherings with Friends and Family:     Attends Baptism Services:     Active Member of Clubs or Organizations:     Attends Club or Organization Meetings:     Marital Status:    Intimate Partner Violence:     Fear of Current or Ex-Partner:     Emotionally Abused:     Physically Abused:     Sexually Abused:          Medications Prior to Admission:      Prior to Admission medications    Medication Sig Start Date End Date Taking? Authorizing Provider   atenolol-chlorthalidone (TENORETIC 100) 100-25 MG per tablet Take 1 tablet by mouth daily 5/17/21  Yes Vesna Romero MD         Allergies:  Clindamycin/lincomycin    PHYSICAL EXAM:      BP (!) 149/86   Pulse 58   Temp 98.2 °F (36.8 °C) (Oral)   Resp 16   Ht 6' 4\" (1.93 m)   Wt 277 lb (125.6 kg)   SpO2 96%   BMI 33.72 kg/m²      Airway:  Airway patent with no audible stridor    Heart:  Regular rate and rhythm, no murmur noted    Lungs:  No increased work of breathing, good air exchange, clear to auscultation bilaterally, no crackles or wheezing    Abdomen:  Soft, non-distended, non-tender, no rebound tenderness or guarding, and no masses palpated    ASSESSMENT AND PLAN:    1. The patients history and physical was obtained and signed off by the pre-admission testing department. Patient seen and focused exam done today- no new changes since last physical exam on 9/29/21    2. Access to ancillary services are available per request of the provider.     JONATAN Perez - IAN     10/5/2021

## 2021-10-05 NOTE — ANESTHESIA PRE PROCEDURE
Department of Anesthesiology  Preprocedure Note       Name:  Ramona Hancock   Age:  66 y.o.  :  1950                                          MRN:  [id number]         Date:  10/5/2021      Surgeon: Kayley Hill): Mayte Lares DO    Procedure: Procedure(s):  ROBOTIC ASSISTED LAPAROSCOPIC SLEEVE GASTRECTOMY    Medications prior to admission:   Prior to Admission medications    Medication Sig Start Date End Date Taking? Authorizing Provider   atenolol-chlorthalidone (TENORETIC 100) 100-25 MG per tablet Take 1 tablet by mouth daily 21  Yes Devika Robles MD       Current medications:    Current Facility-Administered Medications   Medication Dose Route Frequency Provider Last Rate Last Admin    ceFAZolin (ANCEF) 3,000 mg in dextrose 5 % 100 mL IVPB  3,000 mg IntraVENous Once Tivis Castor, DO        scopolamine (TRANSDERM-SCOP) transdermal patch 1 patch  1 patch TransDERmal Once Tivis Castor, DO   1 patch at 10/05/21 0805    lactated ringers infusion   IntraVENous Continuous Kinjal Rosemarie, DO        sodium chloride flush 0.9 % injection 10 mL  10 mL IntraVENous 2 times per day Kinjal Rosemarie, DO        sodium chloride flush 0.9 % injection 10 mL  10 mL IntraVENous PRN Kinjal Rosemarie, DO        0.9 % sodium chloride infusion  25 mL IntraVENous PRN Kinjal Rosemarie, DO           Allergies: Allergies   Allergen Reactions    Clindamycin/Lincomycin Rash       Problem List:    Patient Active Problem List   Diagnosis Code    Hx of hepatitis C Z86.19    Cellulitis of foot, left L03. 80    Essential hypertension I10    Idiopathic peripheral neuropathy G60.9    Toe osteomyelitis, right (HCC) M86.9    Chronic ulcer of left ankle with fat layer exposed ([facility] [facility] L97.322    Chronic osteomyelitis of left foot with draining sinus (HCC) M86.472    Multiple drug resistant organism (MDRO) culture positive Z16.24    Primary osteoarthritis of left knee M17.12    Chronic pain of left knee M25.562, G89.29    Severe obesity (BMI 35.0-39. 9) with comorbidity ([address] E66.01       Past Medical History:        Diagnosis Date    Chest pain 1/2/2015    Chronic GERD     ESBL (extended spectrum beta-lactamase) producing bacteria infection 09/11/2016    E.coli ESBL-8/2015    Hepatitis C virus 01/02/2015    says he received treatment    Hx of hepatitis C 1/16/2015    Hyperlipidemia     Hypertension     Hypertension     Hypertension, essential     MDRO (multiple drug resistant organisms) resistance 09/10/2016    Pseudomonas MDRO in foot 6/23/16    MDRO (multiple drug resistant organisms) resistance 9/22/17; 09/08/2017    Pseudomonas MDR in wound    Morbid obesity ([address]     Non-pressure chronic ulcer of other part of left foot limited to breakdown of skin ([address]     Non-pressure chronic ulcer of other part of right foot limited to breakdown of skin ([address]     Obstructive sleep apnea     Peripheral neuropathy 1/2/2015    Pre-operative clearance        Past Surgical History:        Procedure Laterality Date    FOOT SURGERY Bilateral 10/8/2015    left foot bone biopsy X 2, bilat wound debridement, bilat.  amnio graft    FOOT SURGERY Right 09/13/2016    TRANSMETATARSAL AMPUTATION RIGHT FOOT;    FOOT SURGERY Left 09/25/2017    INCISION AND DRAINAGE WITH EXCISION OF ALL NECROTIC SOFT TISSUE AND BONE, LEFT FOOT WITH APPLICATION OF WOUND VAC    OTHER SURGICAL HISTORY  09/11/2016    PARTIAL FIRST RAY TOE AMPUTATION - RIGHT FOOT (VANCOMYCIN    OTHER SURGICAL HISTORY Left 09/08/2017     TRANSMETATARSAL AMPUTATION LEFT FOOT ;    OTHER SURGICAL HISTORY Left 09/27/2017    INCISION AND DRAINAGE OF NECROTIC TISSUE AND BONE WITH RE-    OTHER SURGICAL HISTORY Left 09/30/2017    RESECTION FIRST METATARSAL WITH PARTIAL RESECTION SECOND,    TONSILLECTOMY      UPPER GASTROINTESTINAL ENDOSCOPY N/A 7/12/2021    [facility] History:    Social History     Tobacco Use    Smoking status: Former Smoker     Packs/day: 0.25     Years: 5.00     Pack years: 1.25     Quit date: 2016     Years since quittin.0    Smokeless tobacco: Never Used   Substance Use Topics    Alcohol use: Not Currently     Alcohol/week: 3.0 standard drinks     Types: 3 Shots of liquor per week                                Counseling given: Not Answered      Vital Signs (Current):   Vitals:    21 1423 10/05/21 0747   BP:  (!) 149/86   Pulse:  58   Resp:  16   Temp:  98.2 °F (36.8 °C)   TempSrc:  Oral   SpO2:  96%   Weight: 297 lb (134.7 kg) 277 lb (125.6 kg)   Height: 6' 4\" (1.93 m) 6' 4\" (1.93 m)                                              BP Readings from Last 3 Encounters:   10/05/21 (!) 149/86   21 (!) 140/80   21 (!) 166/75       NPO Status: Time of last liquid consumption:                         Time of last solid consumption:                         Date of last liquid consumption: 10/04/21                        Date of last solid food consumption: 10/04/21    BMI:   Wt Readings from Last 3 Encounters:   10/05/21 277 lb (125.6 kg)   21 291 lb (132 kg)   21 298 lb 3.2 oz (135.3 kg)     Body mass index is 33.72 kg/m². CBC:   Lab Results   Component Value Date    WBC 5.3 2021    RBC 5.71 2021    HGB 17.5 2021    HCT 52.3 2021    MCV 91.6 2021    RDW 14.7 2021     2021       CMP:   Lab Results   Component Value Date     2021    K 4.2 2021     2021    CO2 22 2021    BUN 21 2021    CREATININE 1.2 2021    GFRAA >60 2021    AGRATIO 2.1 2021    LABGLOM 59 2021    GLUCOSE 114 2021    PROT 7.4 2021    CALCIUM 10.0 2021    BILITOT 0.8 2021    ALKPHOS 66 2021    AST 35 2021    ALT 63 2021       POC Tests: No results for input(s): POCGLU, POCNA, POCK, POCCL, POCBUN, POCHEMO, POCHCT in the last 72 hours.     Coags:   Lab Results   Component Value Date    PROTIME 11.1 10/04/2017    INR 0.98 10/04/2017       HCG (If Applicable): No results found for: PREGTESTUR, PREGSERUM, HCG, HCGQUANT     ABGs: No results found for: PHART, PO2ART, HAW3HYU, IFH9PWF, BEART, E9ARAEIU     Type & Screen (If Applicable):  No results found for: LABABO, LABRH    Drug/Infectious Status (If Applicable):  No results found for: HIV, HEPCAB    COVID-19 Screening (If Applicable): No results found for: COVID19        Anesthesia Evaluation  Patient summary reviewed and Nursing notes reviewed no history of anesthetic complications:   Airway: Mallampati: I  TM distance: >3 FB   Neck ROM: full  Mouth opening: > = 3 FB Dental:    (+) upper dentures      Pulmonary:normal exam    (+) sleep apnea:                             Cardiovascular:  Exercise tolerance: good (>4 METS),   (+) hypertension: mild,       NYHA Classification: I  ECG reviewed  Rhythm: regular  Rate: normal  Echocardiogram reviewed                  Neuro/Psych:   Negative Neuro/Psych ROS              GI/Hepatic/Renal:   (+) GERD: well controlled,           Endo/Other: Negative Endo/Other ROS                    Abdominal:             Vascular: negative vascular ROS. Other Findings:             Anesthesia Plan      general     ASA 2       Induction: intravenous. MIPS: Postoperative opioids intended. Anesthetic plan and risks discussed with patient. Use of blood products discussed with patient whom consented to blood products. Plan discussed with CRNA.     Attending anesthesiologist reviewed and agrees with Preprocedure content              Breanne Rodriguez DO   10/5/2021

## 2021-10-06 ENCOUNTER — APPOINTMENT (OUTPATIENT)
Dept: GENERAL RADIOLOGY | Age: 71
DRG: 621 | End: 2021-10-06
Attending: SURGERY
Payer: MEDICARE

## 2021-10-06 VITALS
DIASTOLIC BLOOD PRESSURE: 72 MMHG | HEART RATE: 59 BPM | SYSTOLIC BLOOD PRESSURE: 142 MMHG | BODY MASS INDEX: 33.73 KG/M2 | WEIGHT: 277 LBS | TEMPERATURE: 98.3 F | OXYGEN SATURATION: 95 % | HEIGHT: 76 IN | RESPIRATION RATE: 16 BRPM

## 2021-10-06 LAB
ANION GAP SERPL CALCULATED.3IONS-SCNC: 16 MMOL/L (ref 3–16)
BUN BLDV-MCNC: 22 MG/DL (ref 7–20)
CALCIUM SERPL-MCNC: 8.7 MG/DL (ref 8.3–10.6)
CHLORIDE BLD-SCNC: 97 MMOL/L (ref 99–110)
CO2: 25 MMOL/L (ref 21–32)
CREAT SERPL-MCNC: 1.1 MG/DL (ref 0.8–1.3)
GFR AFRICAN AMERICAN: >60
GFR NON-AFRICAN AMERICAN: >60
GLUCOSE BLD-MCNC: 105 MG/DL (ref 70–99)
HCT VFR BLD CALC: 49.6 % (ref 40.5–52.5)
HEMOGLOBIN: 16.8 G/DL (ref 13.5–17.5)
MCH RBC QN AUTO: 31.3 PG (ref 26–34)
MCHC RBC AUTO-ENTMCNC: 34 G/DL (ref 31–36)
MCV RBC AUTO: 92.2 FL (ref 80–100)
PDW BLD-RTO: 14.2 % (ref 12.4–15.4)
PLATELET # BLD: 118 K/UL (ref 135–450)
PMV BLD AUTO: 8.9 FL (ref 5–10.5)
POTASSIUM SERPL-SCNC: 3.4 MMOL/L (ref 3.5–5.1)
RBC # BLD: 5.38 M/UL (ref 4.2–5.9)
SODIUM BLD-SCNC: 138 MMOL/L (ref 136–145)
WBC # BLD: 8.2 K/UL (ref 4–11)

## 2021-10-06 PROCEDURE — C9113 INJ PANTOPRAZOLE SODIUM, VIA: HCPCS | Performed by: SURGERY

## 2021-10-06 PROCEDURE — 85027 COMPLETE CBC AUTOMATED: CPT

## 2021-10-06 PROCEDURE — 99232 SBSQ HOSP IP/OBS MODERATE 35: CPT | Performed by: INTERNAL MEDICINE

## 2021-10-06 PROCEDURE — 2580000003 HC RX 258: Performed by: NURSE PRACTITIONER

## 2021-10-06 PROCEDURE — 2580000003 HC RX 258: Performed by: SURGERY

## 2021-10-06 PROCEDURE — 74240 X-RAY XM UPR GI TRC 1CNTRST: CPT

## 2021-10-06 PROCEDURE — 36415 COLL VENOUS BLD VENIPUNCTURE: CPT

## 2021-10-06 PROCEDURE — 80048 BASIC METABOLIC PNL TOTAL CA: CPT

## 2021-10-06 PROCEDURE — 6360000002 HC RX W HCPCS: Performed by: SURGERY

## 2021-10-06 PROCEDURE — 6360000002 HC RX W HCPCS: Performed by: NURSE PRACTITIONER

## 2021-10-06 PROCEDURE — 6370000000 HC RX 637 (ALT 250 FOR IP): Performed by: NURSE PRACTITIONER

## 2021-10-06 RX ORDER — ONDANSETRON 4 MG/1
4 TABLET, FILM COATED ORAL EVERY 6 HOURS PRN
Qty: 30 TABLET | Refills: 0 | Status: SHIPPED | OUTPATIENT
Start: 2021-10-06 | End: 2021-11-17 | Stop reason: ALTCHOICE

## 2021-10-06 RX ORDER — OMEPRAZOLE 20 MG/1
20 CAPSULE, DELAYED RELEASE ORAL DAILY
Qty: 30 CAPSULE | Refills: 3 | Status: SHIPPED | OUTPATIENT
Start: 2021-10-06 | End: 2022-03-14 | Stop reason: SDUPTHER

## 2021-10-06 RX ORDER — POTASSIUM CHLORIDE 7.45 MG/ML
10 INJECTION INTRAVENOUS
Status: DISPENSED | OUTPATIENT
Start: 2021-10-06 | End: 2021-10-06

## 2021-10-06 RX ORDER — ACETAMINOPHEN 160 MG/5ML
650 SOLUTION ORAL EVERY 4 HOURS PRN
Status: DISCONTINUED | OUTPATIENT
Start: 2021-10-06 | End: 2021-10-06 | Stop reason: HOSPADM

## 2021-10-06 RX ORDER — SIMETHICONE 80 MG
80 TABLET,CHEWABLE ORAL EVERY 6 HOURS PRN
Status: DISCONTINUED | OUTPATIENT
Start: 2021-10-06 | End: 2021-10-06 | Stop reason: HOSPADM

## 2021-10-06 RX ORDER — ONDANSETRON 4 MG/1
4 TABLET, FILM COATED ORAL EVERY 6 HOURS PRN
Qty: 30 TABLET | Refills: 0 | Status: SHIPPED | OUTPATIENT
Start: 2021-10-06 | End: 2021-10-06 | Stop reason: SDUPTHER

## 2021-10-06 RX ORDER — OXYCODONE HYDROCHLORIDE AND ACETAMINOPHEN 5; 325 MG/1; MG/1
1 TABLET ORAL EVERY 6 HOURS PRN
Qty: 28 TABLET | Refills: 0 | Status: SHIPPED | OUTPATIENT
Start: 2021-10-06 | End: 2021-10-13

## 2021-10-06 RX ORDER — ATENOLOL 50 MG/1
50 TABLET ORAL DAILY
Qty: 30 TABLET | Refills: 3 | Status: SHIPPED | OUTPATIENT
Start: 2021-10-06 | End: 2021-10-20 | Stop reason: SDUPTHER

## 2021-10-06 RX ORDER — HYOSCYAMINE SULFATE 0.12 MG/1
1 TABLET SUBLINGUAL EVERY 6 HOURS PRN
Qty: 30 EACH | Refills: 0 | Status: SHIPPED | OUTPATIENT
Start: 2021-10-06 | End: 2021-11-17 | Stop reason: ALTCHOICE

## 2021-10-06 RX ORDER — METHOCARBAMOL 750 MG/1
750 TABLET, FILM COATED ORAL 4 TIMES DAILY
Qty: 40 TABLET | Refills: 0 | Status: SHIPPED | OUTPATIENT
Start: 2021-10-06 | End: 2021-10-16

## 2021-10-06 RX ORDER — LIDOCAINE 4 G/G
1 PATCH TOPICAL DAILY
Status: DISCONTINUED | OUTPATIENT
Start: 2021-10-06 | End: 2021-10-06 | Stop reason: HOSPADM

## 2021-10-06 RX ORDER — METOCLOPRAMIDE HYDROCHLORIDE 5 MG/ML
10 INJECTION INTRAMUSCULAR; INTRAVENOUS ONCE
Status: COMPLETED | OUTPATIENT
Start: 2021-10-06 | End: 2021-10-06

## 2021-10-06 RX ORDER — OXYCODONE HCL 5 MG/5 ML
5 SOLUTION, ORAL ORAL EVERY 4 HOURS PRN
Status: DISCONTINUED | OUTPATIENT
Start: 2021-10-06 | End: 2021-10-06 | Stop reason: HOSPADM

## 2021-10-06 RX ORDER — ATENOLOL 50 MG/1
50 TABLET ORAL DAILY
Status: DISCONTINUED | OUTPATIENT
Start: 2021-10-06 | End: 2021-10-06 | Stop reason: HOSPADM

## 2021-10-06 RX ORDER — OXYCODONE HCL 5 MG/5 ML
10 SOLUTION, ORAL ORAL EVERY 4 HOURS PRN
Status: DISCONTINUED | OUTPATIENT
Start: 2021-10-06 | End: 2021-10-06 | Stop reason: HOSPADM

## 2021-10-06 RX ADMIN — SODIUM CHLORIDE: 9 INJECTION, SOLUTION INTRAVENOUS at 04:30

## 2021-10-06 RX ADMIN — METHOCARBAMOL 1000 MG: 100 INJECTION, SOLUTION INTRAMUSCULAR; INTRAVENOUS at 09:11

## 2021-10-06 RX ADMIN — ONDANSETRON 4 MG: 2 INJECTION INTRAMUSCULAR; INTRAVENOUS at 01:40

## 2021-10-06 RX ADMIN — PANTOPRAZOLE SODIUM 40 MG: 40 INJECTION, POWDER, FOR SOLUTION INTRAVENOUS at 09:07

## 2021-10-06 RX ADMIN — METOCLOPRAMIDE HYDROCHLORIDE 10 MG: 5 INJECTION INTRAMUSCULAR; INTRAVENOUS at 07:12

## 2021-10-06 RX ADMIN — ENOXAPARIN SODIUM 40 MG: 40 INJECTION SUBCUTANEOUS at 09:07

## 2021-10-06 RX ADMIN — POTASSIUM CHLORIDE 10 MEQ: 7.45 INJECTION INTRAVENOUS at 10:41

## 2021-10-06 RX ADMIN — POTASSIUM CHLORIDE 10 MEQ: 7.45 INJECTION INTRAVENOUS at 11:39

## 2021-10-06 RX ADMIN — Medication 10 ML: at 09:08

## 2021-10-06 RX ADMIN — OXYCODONE HYDROCHLORIDE 10 MG: 5 SOLUTION ORAL at 09:17

## 2021-10-06 RX ADMIN — SIMETHICONE 80 MG: 80 TABLET, CHEWABLE ORAL at 09:07

## 2021-10-06 RX ADMIN — ATENOLOL 50 MG: 50 TABLET ORAL at 09:08

## 2021-10-06 ASSESSMENT — PAIN SCALES - GENERAL
PAINLEVEL_OUTOF10: 5
PAINLEVEL_OUTOF10: 0
PAINLEVEL_OUTOF10: 5
PAINLEVEL_OUTOF10: 7
PAINLEVEL_OUTOF10: 7

## 2021-10-06 ASSESSMENT — PAIN DESCRIPTION - PROGRESSION: CLINICAL_PROGRESSION: NOT CHANGED

## 2021-10-06 ASSESSMENT — PAIN DESCRIPTION - FREQUENCY: FREQUENCY: CONTINUOUS

## 2021-10-06 ASSESSMENT — PAIN DESCRIPTION - ONSET: ONSET: ON-GOING

## 2021-10-06 ASSESSMENT — PAIN DESCRIPTION - ORIENTATION: ORIENTATION: RIGHT;LEFT;MID

## 2021-10-06 ASSESSMENT — PAIN DESCRIPTION - DESCRIPTORS: DESCRIPTORS: DISCOMFORT

## 2021-10-06 ASSESSMENT — PAIN DESCRIPTION - PAIN TYPE: TYPE: SURGICAL PAIN

## 2021-10-06 ASSESSMENT — PAIN DESCRIPTION - LOCATION: LOCATION: ABDOMEN

## 2021-10-06 ASSESSMENT — PAIN - FUNCTIONAL ASSESSMENT: PAIN_FUNCTIONAL_ASSESSMENT: PREVENTS OR INTERFERES SOME ACTIVE ACTIVITIES AND ADLS

## 2021-10-06 NOTE — PROGRESS NOTES
Patient tolerating clear liquid diet. Patient's pain controlled. 6 abdominal lap sites, cdi, surgical glue, rocio. Abdominal binder on. Reviewed discharge instructions, he verbalized understanding. Prescriptions filled at outpatient pharmacy and given. Patient declined wheelchair transport. Patient accompanied down to main entrance where friend coming to transport home.

## 2021-10-06 NOTE — CARE COORDINATION
Case Management Assessment            Discharge Note                    Date / Time of Note: 10/6/2021 12:54 PM                  Discharge Note Completed by: Orelia Sicard, RN    Patient Name: Yanni Joaquin   YOB: 1950  Diagnosis: Morbid obesity ([facility] [E66.01]  Hypertension, unspecified type [I10]  Osteoarthritis, unspecified osteoarthritis type, unspecified site [M19.90]  Severe obesity (BMI 35.0-35.9 with comorbidity) ([facility] [E66.01, Z68.35]   Date / Time: 10/5/2021  7:32 AM    Current PCP: Joselin Renee MD  Clinic patient: No    Hospitalization in the last 30 days: No    Advance Directives:  Code Status: Full Code  [place] DNR form completed and on chart: Not Indicated    Financial:  Payor: Jodee Norris / Plan: [facility] HMO / Product Type: *No Product type* /      Pharmacy:    [address] [place], [place] [phone] -  [phone]  [address] [place] [id number]  Phone: [phone] Fax: [phone]    NEPHNO [id number] [address] [address] [facility] [phone]  [facility]  [address] [facility] [address] [address]  Phone: [phone] Fax: [phone]    USDMWA [id number] [address] [phone] [facility] [phone]  [place] [id number]  Phone: [phone] Fax: [phone] medications?:    Assistance provided by Case Management: None at this time    Does patient want to participate in local refill/ meds to beds program?:      Meds To Beds General Rules:  1. Can ONLY be done Monday- Friday between 8:30am-5pm  2. Prescription(s) must be in pharmacy by 3pm to be filled same day  3. Copy of patient's insurance/ prescription drug card and patient face sheet must be sent along with the prescription(s)  4. Cost of Rx cannot be added to hospital bill. If financial assistance is needed, please contact unit  or ;  Case manager or  CANNOT provide pharmacy voucher for patients co-pays  5. Patients can then  the prescription on their way out of the hospital at discharge, or pharmacy can deliver to the bedside if staff is available. (payment due at time of pick-up or delivery - cash, check, or card accepted)     Able to afford home medications/ co-pay costs: Yes    ADLS:  Current PT AM-PAC Score:   /24  Current OT AM-PAC Score:   /24      DISCHARGE Disposition: Home- No Services Needed    LOC at discharge: Not Applicable  ALESIA Completed: Not Indicated    Notification completed in HENS/PAS?:  Not Applicable    IMM Completed:   Not Indicated    Transportation:  Transportation PLAN for discharge: family   Mode of Transport: [address] ordered at discharge: Not [address]: Not Applicable  Orders faxed: No    Durable Medical Equipment:  DME Provider: none  Equipment obtained during hospitalization:     Home Oxygen and Respiratory Equipment:  Oxygen needed at discharge?: Not [address]: Not Applicable  Portable tank available for discharge?: Not Indicated    Dialysis:  Dialysis patient: No    Dialysis Center:  Not Applicable      Additional CM Notes: Pt from home will DC home no needs from CM and will follow up OP with surgical team    The Plan for Transition of Care is related to the following treatment goals of Morbid obesity ([facility] [E66.01]  Hypertension, unspecified type [I10]  Osteoarthritis, unspecified osteoarthritis type, unspecified site [M19.90]  Severe obesity (BMI 35.0-35.9 with comorbidity) ([address] [E66.01, Z68.35]    The Patient and/or patient representative Pushpa Taylor and his family were provided with a choice of provider and agrees with the discharge plan Yes    Freedom of choice list was provided with basic dialogue that supports the patient's individualized plan of care/goals and shares the quality data associated with the providers.  Not Indicated    Care Transitions patient: Yes    Ricky Richardson RN  [facility]  Case Management Department  Ph: [phone]  Fax: [phone]

## 2021-10-06 NOTE — PROGRESS NOTES
Hospital Medicine  Progress Note    Chief Complaint:Obesity, HTN  SUBJECTIVE: Patient is improved. There are not new complaints. Tolerating liquids, no flatus    OBJECTIVE      Medications    Infusion Medications    sodium chloride 150 mL/hr at 10/06/21 0430    sodium chloride       Scheduled Medications    atenolol  50 mg Oral Daily    methocarbamol IVPB  1,000 mg IntraVENous Q8H    lidocaine  1 patch TransDERmal Daily    potassium chloride  10 mEq IntraVENous Q1H    scopolamine  1 patch TransDERmal Once    sodium chloride flush  5-40 mL IntraVENous 2 times per day    scopolamine  1 patch TransDERmal Q72H    pantoprazole  40 mg IntraVENous Daily    And    sodium chloride (PF)  10 mL IntraVENous Daily    enoxaparin  40 mg SubCUTAneous 2 times per day       Physical   Temperature:  Current - Temp: 97.5 °F (36.4 °C);  Max - Temp  Av.9 °F (34.4 °C)  Min: 74.5 °F (23.6 °C)  Max: 97.8 °F (36.6 °C)    Respiratory Rate : Resp  Av.4  Min: 0  Max: 20    Pulse Range: Pulse  Av.6  Min: 53  Max: 75    Blood Presuure Range:  Systolic (46MZU), MVZ:290 , Min:98 , LFR:969   ; Diastolic (85LAL), ADW:53, Min:53, Max:129      Pulse ox Range: SpO2  Av.3 %  Min: 94 %  Max: 100 %    24hr I & O:      Intake/Output Summary (Last 24 hours) at 10/6/2021 0846  Last data filed at 10/6/2021 0520  Gross per 24 hour   Intake 1000.2 ml   Output 1090 ml   Net -89.8 ml       Constitutional:  awake, alert, cooperative, no apparent distress, and appears stated age  Eyes:  pupils equal, round and reactive to light  ENT:  normocepalic, without obvious abnormality  NECK:  supple, symmetrical, trachea midline  HEMATOLOGIC/LYMPHATICS:  no cervical lymphadenopathy  LUNGS:  No increased work of breathing, good air exchange, clear to auscultation bilaterally, no crackles or wheezing  CARDIOVASCULAR: regular rate and rhythm, S1, S2 normal, no murmur, click, rub or gallop  ABDOMEN:  soft, protuberant and hypoactive bowel sounds  MUSCULOSKELETAL:  There is no redness, warmth, or swelling of the joints. Full range of motion noted. Motor strength is 5 out of 5 all extremities bilaterally. Tone is normal.  SKIN:  normal skin color, texture, turgor    Data      CBC:   Lab Results   Component Value Date    WBC 8.2 10/06/2021    RBC 5.38 10/06/2021    HGB 16.8 10/06/2021    HCT 49.6 10/06/2021    MCV 92.2 10/06/2021    MCH 31.3 10/06/2021    MCHC 34.0 10/06/2021    RDW 14.2 10/06/2021     10/06/2021    MPV 8.9 10/06/2021     CMP:    Lab Results   Component Value Date     10/06/2021    K 3.4 10/06/2021    CL 97 10/06/2021    CO2 25 10/06/2021    BUN 22 10/06/2021    CREATININE 1.1 10/06/2021    GFRAA >60 10/06/2021    AGRATIO 2.1 09/29/2021    LABGLOM >60 10/06/2021    GLUCOSE 105 10/06/2021    PROT 7.4 09/29/2021    LABALBU 5.0 09/29/2021    CALCIUM 8.7 10/06/2021    BILITOT 0.8 09/29/2021    ALKPHOS 66 09/29/2021    AST 35 09/29/2021    ALT 63 09/29/2021         ASSESSMENT AND PLAN      Principal Problem:    Severe obesity (BMI 35.0-39. 9) with comorbidity ([address]  Plan: s/p baritric surgery as per surgery  Active Problems:    Essential hypertension  Plan: This problem is stable. Continue present medications.       Hiatal hernia  Plan Stable s/p surgery

## 2021-10-06 NOTE — PROGRESS NOTES
Patient alert and oriented. VSS . Patient was on  PCA pump all night. CDI surgical sites . Patient had adequate amount of urine output. Patient ambulates in white. Patient in bed lowest position call light and bedside table within reach. All needs are met at this time. Patient aware to call if any help needed. Will continue to monitor

## 2021-10-06 NOTE — PROGRESS NOTES
Surgery Daily Progress Note  Kayleigh Vance  CC: Morbid Obesity  Subjective :  No emesis overnight and minimal nausea. C/O gas pain. Has been up OOB and ambulating with assistance of 2 (bilateral TMA). Objective    Infusions:   sodium chloride 150 mL/hr at 10/06/21 0430    sodium chloride          I/O:I/O last 3 completed shifts: In: 1000 [I.V.:900; IV Piggyback:100]  Out: 46 [Urine:450; Blood:15]           Wt Readings from Last 1 Encounters:   10/05/21 277 lb (125.6 kg)                           Exam:BP (!) 157/75   Pulse 58   Temp 97.5 °F (36.4 °C) (Oral)   Resp 16   Ht 6' 4\" (1.93 m)   Wt 277 lb (125.6 kg)   SpO2 95%   BMI 33.72 kg/m²   General appearance: alert, appears stated age and cooperative  Lungs: clear to auscultation bilaterally  Heart: regular rate and rhythm, S1, S2 normal, no murmur, click, rub or gallop  Abdomen: soft, appropriately-tender; bowel sounds quiet  Trocar sites well approx  EXT:  Bilateral tma; pulses with doppler      ASSESSMENT/PLAN: Pt. is a 66 y.o. male s/p Robotic Assisted Laparoscopic Sleeve Gastrectomy, Robotic Hiatal Hernia Repair POD# 1    Awaiting on imaging  Wants to try tylenol for pain; add robaxin  Start on Bariatric Clears once imaging completed    Pharmacy to review home meds -pills to be crushed for 2 weeks post op    Monitor progress throughout the day. D/C late afternoon as long as able to take adequate po to remain hydrated without IVF, voiding, pain and nausea tolerable with oral meds, using IS frequently and ambulating.     D/W Dr. Negro Rooney, APRN - CNP 10/6/2021 6:04 AM  [phone]

## 2021-10-07 ENCOUNTER — CARE COORDINATION (OUTPATIENT)
Dept: CASE MANAGEMENT | Age: 71
End: 2021-10-07

## 2021-10-07 NOTE — CARE COORDINATION
[facility] Transitions Initial Follow Up Call    Call within 2 business days of discharge: Yes    Patient:  Annabella Organ Patient :  1950  MRN:  [id number]   Reason for Admission:  gastrectomy  Discharge Date:  10/6/21  RARS: 32      CTC attempt to reach Pt regarding recent hospital discharge. CTC left voice recording with call back number requesting a call back. Follow up appointments:    Future Appointments   Date Time Provider Lucretia Dee   10/20/2021 10:15 AM DO YVETTE Arboleda [facility]       THANH SandsN, RN  Care Transition Coordinator  Contact Number:  [phone]

## 2021-10-07 NOTE — DISCHARGE SUMMARY
Patient ID:  Kandice Stearns  [id number]  18 y.o.  1950    Admit date: 10/5/2021    Discharge date and time: 10/6/21    Admitting Physician: Tierra Stone DO     Discharge Physician: same    Admission Diagnoses: Morbid obesity ([facility] [E66.01]  Hypertension, unspecified type [I10]  Osteoarthritis, unspecified osteoarthritis type, unspecified site [M19.90]  Severe obesity (BMI 35.0-35.9 with comorbidity) ([facility] [E66.01, Z68.35]    Discharge Diagnoses: same    Admission Condition: fair    Discharged Condition: stable    Indication for Admission: Surgery: Robotic assisted Laparoscopic Sleeve Gastrectomy, IV hydration, monitoring, pain and nausea management    Hospital Course: 66 y.o. male admitted with morbid obesity who underwent Robotic assisted laparoscopic sleeve gastrectomy. Surgery was uneventful and he was admitted to bariatric post-operative surgical floor in stable condition for IV hydration, monitoring and pain and nausea management. The following morning the pain was tolerable on po pain medication and was taking adequate po. He was discharged in stable condition. Treatments: IV hydration        Disposition: home    Patient Instructions: Activity: activity as tolerated and no driving while on analgesics  Diet: clear liquids  Wound Care: as directed    Follow-up with Dr. Jalen Anderson in two weeks.         Signed:  JONATAN Parrish CNP  10/7/2021  7:47 AM

## 2021-10-08 ENCOUNTER — CARE COORDINATION (OUTPATIENT)
Dept: CASE MANAGEMENT | Age: 71
End: 2021-10-08

## 2021-10-08 NOTE — CARE COORDINATION
[facility] Transitions Initial Follow Up Call    Call within 2 business days of discharge: Yes    Patient:  Prakash Gamez Patient :  1950  MRN:  [id number]   Reason for Admission:   Gastrectomy  Discharge Date:  10/6/21  RARS: 32    CTC attempt to reach Pt regarding recent hospital discharge. CTC left voice recording with call back number requesting a call back. Follow up appointments:    Future Appointments   Date Time Provider Lucretia Dee   10/20/2021 10:15 AM DO YVETTE Roman [facility]       Jenetta Lennox V. Lilly Alderman, BSN, RN  Care Transition Coordinator  Contact Number:  [phone]

## 2021-10-12 ENCOUNTER — TELEPHONE (OUTPATIENT)
Dept: BARIATRICS/WEIGHT MGMT | Age: 71
End: 2021-10-12

## 2021-10-12 NOTE — TELEPHONE ENCOUNTER
Surgery Type: Sleeve & HH    Surgery Date: 10/5/21    Surgeon: Dr. Moises Randall    The patient was contacted to follow up on their recent bariatric surgery. The following topics were reviewed:    [x] Hydration is Adequate - water, SF crystal, SF elodia-aide, Gatorade zero           --Patient is getting at least 48-64 oz of fluids a day, not including protein shakes. [x]Consuming Adequate Protein- Nectar/Unjury          [x]Consuming 2 protein shakes a day                [x]Consuming 60-80 grams of protein a day    [x] Food intake is appropriate- beans,   [x]Adequately pureeing foods, so that there are no chunks left. [x]Taking in 1-2 oz at a time- 2 oz per sitting  [x] Eating 4-6 times a day  [x] Following the 30-30-30 rule  [x] Reminded patient to keep food diary to bring to their 2 week follow up appointment. [x] Pain relief techniques utilized- improving daily, minimal pain at this point. Does not feel he needs any pain interventions. [] Taking pain medication as prescribed  [] Utilizing Lidoderm patches (if prescribed)  []Taking Tylenol instead of prescription pain medication  [] Wearing abdominal binder  [] Using ice for incisional pain    [x] Activity is appropriate  [x] Walking 10 minutes out of every hour  [x]Avoiding heavy lifting (>10lbs)  [x] Utilizing their incentive spirometer    [] Issues with Nausea/Vomiting/Reflux  [] Using Zofran PRN for nausea/vomiting - no issues  [x]Taking Prilosec for reflux    [] Issues with Constipation- BMs since surgery  []Tried Colace  []Tried Miralax    All questions and concerns addressed. Patient doing very well so far.

## 2021-10-20 ENCOUNTER — OFFICE VISIT (OUTPATIENT)
Dept: INTERNAL MEDICINE CLINIC | Age: 71
End: 2021-10-20
Payer: MEDICARE

## 2021-10-20 ENCOUNTER — OFFICE VISIT (OUTPATIENT)
Dept: BARIATRICS/WEIGHT MGMT | Age: 71
End: 2021-10-20

## 2021-10-20 VITALS
SYSTOLIC BLOOD PRESSURE: 118 MMHG | DIASTOLIC BLOOD PRESSURE: 76 MMHG | BODY MASS INDEX: 33.35 KG/M2 | OXYGEN SATURATION: 99 % | HEART RATE: 55 BPM | WEIGHT: 274 LBS

## 2021-10-20 VITALS
HEIGHT: 76 IN | HEART RATE: 56 BPM | WEIGHT: 274.38 LBS | BODY MASS INDEX: 33.41 KG/M2 | DIASTOLIC BLOOD PRESSURE: 86 MMHG | SYSTOLIC BLOOD PRESSURE: 141 MMHG

## 2021-10-20 DIAGNOSIS — Z98.84 GASTRIC BYPASS STATUS FOR OBESITY: ICD-10-CM

## 2021-10-20 DIAGNOSIS — D69.6 THROMBOCYTOPENIA, UNSPECIFIED (HCC): ICD-10-CM

## 2021-10-20 DIAGNOSIS — Z98.84 GASTRIC BYPASS STATUS FOR OBESITY: Primary | ICD-10-CM

## 2021-10-20 DIAGNOSIS — Z98.84 STATUS POST LAPAROSCOPIC SLEEVE GASTRECTOMY: Primary | ICD-10-CM

## 2021-10-20 LAB
A/G RATIO: 2.2 (ref 1.1–2.2)
ALBUMIN SERPL-MCNC: 4.8 G/DL (ref 3.4–5)
ALP BLD-CCNC: 68 U/L (ref 40–129)
ALT SERPL-CCNC: 50 U/L (ref 10–40)
ANION GAP SERPL CALCULATED.3IONS-SCNC: 16 MMOL/L (ref 3–16)
AST SERPL-CCNC: 33 U/L (ref 15–37)
BASOPHILS ABSOLUTE: 0 K/UL (ref 0–0.2)
BASOPHILS RELATIVE PERCENT: 0.8 %
BILIRUB SERPL-MCNC: 0.7 MG/DL (ref 0–1)
BUN BLDV-MCNC: 17 MG/DL (ref 7–20)
CALCIUM SERPL-MCNC: 9.6 MG/DL (ref 8.3–10.6)
CHLORIDE BLD-SCNC: 102 MMOL/L (ref 99–110)
CO2: 23 MMOL/L (ref 21–32)
CREAT SERPL-MCNC: 1.3 MG/DL (ref 0.8–1.3)
EOSINOPHILS ABSOLUTE: 0.1 K/UL (ref 0–0.6)
EOSINOPHILS RELATIVE PERCENT: 2.2 %
GFR AFRICAN AMERICAN: >60
GFR NON-AFRICAN AMERICAN: 54
GLOBULIN: 2.2 G/DL
GLUCOSE BLD-MCNC: 85 MG/DL (ref 70–99)
HCT VFR BLD CALC: 49.7 % (ref 40.5–52.5)
HEMOGLOBIN: 16.6 G/DL (ref 13.5–17.5)
LYMPHOCYTES ABSOLUTE: 1.2 K/UL (ref 1–5.1)
LYMPHOCYTES RELATIVE PERCENT: 22.2 %
MCH RBC QN AUTO: 30.5 PG (ref 26–34)
MCHC RBC AUTO-ENTMCNC: 33.4 G/DL (ref 31–36)
MCV RBC AUTO: 91.4 FL (ref 80–100)
MONOCYTES ABSOLUTE: 0.5 K/UL (ref 0–1.3)
MONOCYTES RELATIVE PERCENT: 9.1 %
NEUTROPHILS ABSOLUTE: 3.6 K/UL (ref 1.7–7.7)
NEUTROPHILS RELATIVE PERCENT: 65.7 %
PDW BLD-RTO: 14.6 % (ref 12.4–15.4)
PLATELET # BLD: 159 K/UL (ref 135–450)
PMV BLD AUTO: 9.8 FL (ref 5–10.5)
POTASSIUM SERPL-SCNC: 4.7 MMOL/L (ref 3.5–5.1)
RBC # BLD: 5.44 M/UL (ref 4.2–5.9)
SODIUM BLD-SCNC: 141 MMOL/L (ref 136–145)
TOTAL PROTEIN: 7 G/DL (ref 6.4–8.2)
WBC # BLD: 5.5 K/UL (ref 4–11)

## 2021-10-20 PROCEDURE — G8428 CUR MEDS NOT DOCUMENT: HCPCS | Performed by: INTERNAL MEDICINE

## 2021-10-20 PROCEDURE — 1111F DSCHRG MED/CURRENT MED MERGE: CPT | Performed by: INTERNAL MEDICINE

## 2021-10-20 PROCEDURE — G8484 FLU IMMUNIZE NO ADMIN: HCPCS | Performed by: INTERNAL MEDICINE

## 2021-10-20 PROCEDURE — G8417 CALC BMI ABV UP PARAM F/U: HCPCS | Performed by: INTERNAL MEDICINE

## 2021-10-20 PROCEDURE — 1036F TOBACCO NON-USER: CPT | Performed by: INTERNAL MEDICINE

## 2021-10-20 PROCEDURE — 1123F ACP DISCUSS/DSCN MKR DOCD: CPT | Performed by: INTERNAL MEDICINE

## 2021-10-20 PROCEDURE — 3017F COLORECTAL CA SCREEN DOC REV: CPT | Performed by: INTERNAL MEDICINE

## 2021-10-20 PROCEDURE — 4040F PNEUMOC VAC/ADMIN/RCVD: CPT | Performed by: INTERNAL MEDICINE

## 2021-10-20 PROCEDURE — 99213 OFFICE O/P EST LOW 20 MIN: CPT | Performed by: INTERNAL MEDICINE

## 2021-10-20 PROCEDURE — 99024 POSTOP FOLLOW-UP VISIT: CPT | Performed by: SURGERY

## 2021-10-20 RX ORDER — ATENOLOL 50 MG/1
50 TABLET ORAL DAILY
Qty: 90 TABLET | Refills: 2 | Status: SHIPPED | OUTPATIENT
Start: 2021-10-20 | End: 2022-05-16

## 2021-10-20 NOTE — PROGRESS NOTES
Dietary Assessment Note      Vitals:   Vitals:    10/20/21 1024   BP: (!) 141/86   Pulse: 56   Weight: 247 lb 6 oz (112.2 kg)   Height: 6' 4\" (1.93 m)    Patient lost 23.6 lbs over 6 weeks. Total Weight Loss: 29.2 lbs    Labs reviewed: no lab studies available for review at time of visit    Protein intake: 60-80 grams/day - 2 protein shakes with 8oz carbmaster skim    Fluid intake: 48-64 oz/day - water / Gatorades Zero / CL / decaf coffee    Multivitamin/mineral intake: Yes - 4 Fusion    Calcium intake: no    Other: none    Exercise: walking    Nutrition Assessment: 2 weeks post-op visit. Drinking 2 protein shakes. Eating 2oz pureed foods 4x day. Tried pureed frozen fruit in shake / liquid eggs / pureed soups / mashed potatoes / cream of wheat / SF applesauce / pureed 3 beans / yogurt / pureed mashed cauliflower.     Amount able to eat per sittinoz    Following /30 rule: Yes    Food Intolerances/issues: none    Client Concerns: none    Goals:  - Continue to track fluid and protein to goal  - Avoid fresh produce until 6 weeks post-op  - Phase 3 provided and reviewed - advance at 3 weeks post-op    Plan: F/U at 14 weeks post-op    Ronnie Arauz RD, LD

## 2021-10-20 NOTE — PROGRESS NOTES
[facility]) Physicians   General & Laparoscopic Surgery  [employer]       HPI:     Cayden Porter is a very pleasant 66 y.o. obese male , Body mass index is 30.11 kg/m². Barnetta Barrow And multiple medical problems who is presenting for bariatric follow up care. Cayden Porter is s/p laparoscopic sleeve gastrectomy by me   Comes today to the clinic without any complaints. Patient denies any nausea, vomiting, fevers, chills, shortness of breath, chest pain, constipation or urinary symptoms. Denies any heartburn nor dysphagia. Patient is feeling very well, and is very active. Patient is very pleased with the weight loss and resolution of co-morbid conditions. Past Medical History:   Diagnosis Date    Chest pain 1/2/2015    Chronic GERD     ESBL (extended spectrum beta-lactamase) producing bacteria infection 09/11/2016    E.coli ESBL-8/2015    Hepatitis C virus 01/02/2015    says he received treatment    Hx of hepatitis C 1/16/2015    Hyperlipidemia     Hypertension     Hypertension     Hypertension, essential     MDRO (multiple drug resistant organisms) resistance 09/10/2016    Pseudomonas MDRO in foot 6/23/16    MDRO (multiple drug resistant organisms) resistance 9/22/17; 09/08/2017    Pseudomonas MDR in wound    Morbid obesity ([address]     Non-pressure chronic ulcer of other part of left foot limited to breakdown of skin ([address]     Non-pressure chronic ulcer of other part of right foot limited to breakdown of skin ([address]     Obstructive sleep apnea     Peripheral neuropathy 1/2/2015    Pre-operative clearance      Past Surgical History:   Procedure Laterality Date    FOOT SURGERY Bilateral 10/8/2015    left foot bone biopsy X 2, bilat wound debridement, bilat.  amnio graft    FOOT SURGERY Right 09/13/2016    TRANSMETATARSAL AMPUTATION RIGHT FOOT;    FOOT SURGERY Left 09/25/2017    INCISION AND DRAINAGE WITH EXCISION OF ALL NECROTIC SOFT TISSUE AND BONE, LEFT FOOT WITH APPLICATION OF WOUND VAC    OTHER SURGICAL HISTORY  2016    PARTIAL FIRST RAY TOE AMPUTATION - RIGHT FOOT (VANCOMYCIN    OTHER SURGICAL HISTORY Left 2017     TRANSMETATARSAL AMPUTATION LEFT FOOT ;    OTHER SURGICAL HISTORY Left 2017    INCISION AND DRAINAGE OF NECROTIC TISSUE AND BONE WITH RE-    OTHER SURGICAL HISTORY Left 2017    RESECTION FIRST METATARSAL WITH PARTIAL RESECTION SECOND,    SLEEVE GASTRECTOMY N/A 10/5/2021    ROBOTIC SLEEVE GASTRECTOMY; ROBOTIC HIATAL HERNIA REPAIR performed by Dannis Brunner, DO at [address] N/A 2021    EGD BIOPSY performed by Dannis Brunner, DO at [facility]     No family history on file. Social History     Tobacco Use    Smoking status: Former Smoker     Packs/day: 0.25     Years: 5.00     Pack years: 1.25     Quit date: 2016     Years since quittin.1    Smokeless tobacco: Never Used   Substance Use Topics    Alcohol use: Not Currently     Alcohol/week: 3.0 standard drinks     Types: 3 Shots of liquor per week     I counseled the patient on the importance of not smoking and risks of ETOH. Allergies   Allergen Reactions    Clindamycin/Lincomycin Rash     Vitals:    10/20/21 1024   BP: (!) 141/86   Pulse: 56   Weight: 247 lb 6 oz (112.2 kg)   Height: 6' 4\" (1.93 m)       Body mass index is 30.11 kg/m².       Current Outpatient Medications:     atenolol (TENORMIN) 50 MG tablet, Take 1 tablet by mouth daily Please use this prescription for the two weeks that pills need to be crushed then resume previous prescription, Disp: 30 tablet, Rfl: 3    Hyoscyamine Sulfate SL (LEVSIN/SL) 0.125 MG SUBL, Place 1 tablet under the tongue every 6 hours as needed (spasm), Disp: 30 each, Rfl: 0    omeprazole (PRILOSEC) 20 MG delayed release capsule, Take 1 capsule by mouth Daily, Disp: 30 capsule, Rfl: 3    ondansetron (ZOFRAN) 4 MG tablet, Take 1 tablet by mouth every 6 hours as needed for Nausea or Vomiting, Disp: 30 tablet, Rfl: 0      Review of Systems - History obtained from the patient  General ROS: negative  Psychological ROS: negative  Hematological and Lymphatic ROS: negative  Endocrine ROS: negative  Respiratory ROS: negative  Cardiovascular ROS: negative  Gastrointestinal ROS:negative  Genito-Urinary ROS: negative  Musculoskeletal ROS: negative   Skin ROS: negative    Physical Exam   Vitals Reviewed   Constitutional: Patient is oriented to person, place, and time. Patient appears well-developed and well-nourished. Patient is active and cooperative. Non-toxic appearance. No distress. Neck: Trachea normal and normal range of motion. No JVD present. Pulmonary/Chest: Effort normal. No accessory muscle usage or stridor. No apnea. No respiratory distress. Cardiovascular: Normal rate and no JVD. Abdominal: Normal appearance. Patient exhibits no distension. Abdomen is soft, obese, non tender. Musculoskeletal: Normal range of motion. Patient exhibits no edema. Neurological: Patient is alert and oriented to person, place, and time. Patient has normal strength. GCS eye subscore is 4. GCS verbal subscore is 5. GCS motor subscore is 6. Skin: Skin is warm and dry. No abrasion and no rash noted. Patient is not diaphoretic. No cyanosis or erythema. Psychiatric: Patient has a normal mood and affect. Speech is normal and behavior is normal. Cognition and memory are normal.         A/P     Steven Carcamo is 66 y.o. male , now with Body mass index is 30.11 kg/m². s/p Sleeve gastrectomy, has lost 24 lbs since last visit, total of 29 lbs weight loss. The patient underwent dietary counseling with registered dietician. I have reviewed, discussed and agree with the dietary plan. Patient is trying hard to keep good dietary and behavior modifications. Patient is monitoring portion sizes, food choices and liquid calories. Patient is trying to exercise regularly. Patient pleased with the surgery outcomes.   We discussed how his weight affects his overall health including:  Kim Grullon was seen today for obesity. Diagnoses and all orders for this visit:    Status post laparoscopic sleeve gastrectomy       and importance of weight loss to alleviate those co morbid conditions. I encouraged the patient to continue exercise and keeping healthy eating habits. Also counseled the patient extensively on post surgery care. RTC in 1 month  Diet and Exercise      Patient advised that its their responsibility to follow up for studies and/or labs ordered today.

## 2021-10-20 NOTE — PROGRESS NOTES
CHIEF COMPLAINT: Kayleigh Vance is a 66 y.o. male who presents for : Follow-up after gastric bypass hypertension    HPI: Patient presented with both of the above he has been doing fine he is now been tapered down on his Tenoretic to just Tenormin 50 daily and feels fine he is able to take soft food    Review of Systems:   Constitutional:  Denies fever or chills   Eyes:  Denies change in visual acuity   HENT:  Denies nasal congestion or sore throat   Respiratory:  Denies cough or shortness of breath   Cardiovascular:  Denies chest pain or edema   GI:  Denies abdominal pain, nausea, vomiting, bloody stools or diarrhea   :  Denies dysuria   Musculoskeletal:  Denies back pain or joint pain   Integument:  Denies rash   Neurologic:  Denies headache, focal weakness or sensory changes   Endocrine:  Denies polyuria or polydipsia   Lymphatic:  Denies swollen glands   Psychiatric:  Denies depression or anxiety     Past Medical History:        Diagnosis Date    Chest pain 1/2/2015    Chronic GERD     ESBL (extended spectrum beta-lactamase) producing bacteria infection 09/11/2016    E.coli ESBL-8/2015    Hepatitis C virus 01/02/2015    says he received treatment    Hx of hepatitis C 1/16/2015    Hyperlipidemia     Hypertension     Hypertension     Hypertension, essential     MDRO (multiple drug resistant organisms) resistance 09/10/2016    Pseudomonas MDRO in foot 6/23/16    MDRO (multiple drug resistant organisms) resistance 9/22/17; 09/08/2017    Pseudomonas MDR in wound    Morbid obesity ([address]     Non-pressure chronic ulcer of other part of left foot limited to breakdown of skin ([address]     Non-pressure chronic ulcer of other part of right foot limited to breakdown of skin ([address]     Obstructive sleep apnea     Peripheral neuropathy 1/2/2015    Pre-operative clearance        Past Surgical History:        Procedure Laterality Date    FOOT SURGERY Bilateral 10/8/2015    left foot bone biopsy X 2, bilat wound debridement, bilat. amnio graft    FOOT SURGERY Right 2016    TRANSMETATARSAL AMPUTATION RIGHT FOOT;    FOOT SURGERY Left 2017    INCISION AND DRAINAGE WITH EXCISION OF ALL NECROTIC SOFT TISSUE AND BONE, LEFT FOOT WITH APPLICATION OF WOUND VAC    OTHER SURGICAL HISTORY  2016    PARTIAL FIRST RAY TOE AMPUTATION - RIGHT FOOT (VANCOMYCIN    OTHER SURGICAL HISTORY Left 2017     TRANSMETATARSAL AMPUTATION LEFT FOOT ;    OTHER SURGICAL HISTORY Left 2017    INCISION AND DRAINAGE OF NECROTIC TISSUE AND BONE WITH RE-    OTHER SURGICAL HISTORY Left 2017    RESECTION FIRST METATARSAL WITH PARTIAL RESECTION SECOND,    SLEEVE GASTRECTOMY N/A 10/5/2021    ROBOTIC SLEEVE GASTRECTOMY; ROBOTIC HIATAL HERNIA REPAIR performed by Beverley Mcmanus DO at [facility] N/A 2021    EGD BIOPSY performed by Beverley Mcmanus DO at [facility] ENDOSCOPY       Family History:  No family history on file. Social History:  Social History     Socioeconomic History    Marital status:       Spouse name: None    Number of children: None    Years of education: None    Highest education level: None   Occupational History    None   Tobacco Use    Smoking status: Former Smoker     Packs/day: 0.25     Years: 5.00     Pack years: 1.25     Quit date: 2016     Years since quittin.1    Smokeless tobacco: Never Used   Substance and Sexual Activity    Alcohol use: Not Currently     Alcohol/week: 3.0 standard drinks     Types: 3 Shots of liquor per week    Drug use: No    Sexual activity: Yes     Partners: Female   Other Topics Concern    None   Social History Narrative    None     Social Determinants of Health     Financial Resource Strain: Low Risk     Difficulty of Paying Living Expenses: Not hard at all   Food Insecurity: No Food Insecurity    Worried About Running Out of Food in the Last Year: Never true    [address] in the Last Year: Never true   Transportation Needs: No Transportation Needs    Lack of Transportation (Medical): No    Lack of Transportation (Non-Medical): No   Physical Activity:     Days of Exercise per Week:     Minutes of Exercise per Session:    Stress:     Feeling of Stress :    Social Connections:     Frequency of Communication with Friends and Family:     Frequency of Social Gatherings with Friends and Family:     Attends Congregational Services:     Active Member of Clubs or Organizations:     Attends Club or Organization Meetings:     Marital Status:    Intimate Partner Violence:     Fear of Current or Ex-Partner:     Emotionally Abused:     Physically Abused:     Sexually Abused: Allergies:  Clindamycin/lincomycin    Current Medications:    Prior to Admission medications    Medication Sig Start Date End Date Taking? Authorizing Provider   atenolol (TENORMIN) 50 MG tablet Take 1 tablet by mouth daily Please use this prescription for the two weeks that pills need to be crushed then resume previous prescription 10/20/21  Yes Damion Mello MD   Hyoscyamine Sulfate SL (LEVSIN/SL) 0.125 MG SUBL Place 1 tablet under the tongue every 6 hours as needed (spasm) 10/6/21   JONATAN Corrales CNP   omeprazole (PRILOSEC) 20 MG delayed release capsule Take 1 capsule by mouth Daily 10/6/21   JONATAN Corrales CNP   ondansetron (ZOFRAN) 4 MG tablet Take 1 tablet by mouth every 6 hours as needed for Nausea or Vomiting 10/6/21   JONATAN Corrales CNP       Physical Exam:  Vital Signs: /76   Pulse 55   Wt 274 lb (124.3 kg)   SpO2 99%   BMI 33.35 kg/m²   General: Patient appears  non-toxic  HENT: Atraumatic, normocephalic, oral mucosa moist  Lungs:  Clear bilaterally  Heart: Regular rate and rhythm  Abdomen: Non-distended, soft, non-tender well-healed surgical scars  Extremities: No edema  Neuro: Nonfocal    Medical Decision Making and Plan:  Pertinent Labs & Imaging studies reviewed.  (See chart for details)  Studies pending    1. Gastric bypass status for obesity  Problem is stable check above labs  - CBC Auto Differential; Future  - Comprehensive Metabolic Panel; Future    2.  Thrombocytopenia, unspecified  As above labs okay see back in 3 months he is also to get a Covid booster in the interim

## 2021-11-17 ENCOUNTER — OFFICE VISIT (OUTPATIENT)
Dept: BARIATRICS/WEIGHT MGMT | Age: 71
End: 2021-11-17

## 2021-11-17 VITALS — BODY MASS INDEX: 32.34 KG/M2 | HEIGHT: 76 IN | WEIGHT: 265.6 LBS

## 2021-11-17 DIAGNOSIS — Z98.84 STATUS POST LAPAROSCOPIC SLEEVE GASTRECTOMY: Primary | ICD-10-CM

## 2021-11-17 PROCEDURE — 99024 POSTOP FOLLOW-UP VISIT: CPT | Performed by: SURGERY

## 2021-11-17 NOTE — PROGRESS NOTES
Dietary Assessment Note      Vitals:   Vitals:    21 1254   Weight: 265 lb 9.6 oz (120.5 kg)   Height: 6' 4\" (1.93 m)    Patient lost 9 lbs over the past 4 weeks. Total Weight Loss: 38.2 lbs    Labs reviewed: no lab studies available for review at time of visit    Protein intake: 60-80 grams/day     Fluid intake: 48-64 oz/day    Multivitamin/mineral intake: yes 4 Fusion per day    Calcium intake: no    Other: n/a    Exercise: none organized yet    Nutrition Assessment: Brkst is 1 egg with cottage cheese, cheese scrambled OR protein pancake blended with cottage cheese with sf syrup; lunch is tuna salad or chix salad or ham salad with sf applesauce; snack is cheese and crax; dinner is french onion soup with swiss cheese OR meatloaf, mashed potatoes. Pt is drinking 1 protein shake daily.      Amount able to eat per sittinoz    Following  rule: yes    Food Intolerances/issues: none    Client Concerns: no concerns; pt feels great    Goals: advance to Phase 4    Plan: follow up as needed    Luigi Manzo RD, LD

## 2021-11-28 NOTE — PROGRESS NOTES
Patient doing well  No N/V/F/C  Tolerating diet  Having regular BM's    Incisions C/D/I    6 weeks s/p sleeve gastrectomy    F/U in 8 weeks    Neeta Arredondo

## 2022-03-14 ENCOUNTER — TELEPHONE (OUTPATIENT)
Dept: INTERNAL MEDICINE CLINIC | Age: 72
End: 2022-03-14

## 2022-03-14 RX ORDER — OMEPRAZOLE 20 MG/1
20 CAPSULE, DELAYED RELEASE ORAL DAILY
Qty: 90 CAPSULE | Refills: 3 | Status: SHIPPED | OUTPATIENT
Start: 2022-03-14

## 2022-03-14 NOTE — TELEPHONE ENCOUNTER
Prescription Inquiry     Kyung Phalen \"Dominick Aguayo\"  Lucy Kebede MD 4 days ago     Sundeep Krishnamurthy,   Dr. Sincere Tavarez is aware that I must take a daily dose of the Prilosec generic (Omeprazole 20 MG) in order to avoid every day occurrences of acid reflux (GERD). My over the counter purchases are costly, whereas a 90 count of Omeprazole 20 MG is 100% covered by my insurance. [address] prescription would not only be free, but mailed to my home. Medicare requires a 90 day prescription pill quantities to cover the prescription. Please ask Dr. Sincere Tavarez to okay a 90 day prescription of Omeprazole 20 MG and please submit it to my [address] on file.    Thank you,   Markie Cox

## 2022-05-05 PROBLEM — Z87.2 HISTORY OF ULCER OF LOWER EXTREMITY: Status: ACTIVE | Noted: 2017-01-20

## 2022-05-16 RX ORDER — ATENOLOL 50 MG/1
TABLET ORAL
Qty: 90 TABLET | Refills: 2 | Status: SHIPPED | OUTPATIENT
Start: 2022-05-16

## 2022-07-19 ENCOUNTER — PATIENT MESSAGE (OUTPATIENT)
Dept: INTERNAL MEDICINE CLINIC | Age: 72
End: 2022-07-19

## 2022-09-15 ENCOUNTER — PATIENT MESSAGE (OUTPATIENT)
Dept: INTERNAL MEDICINE CLINIC | Age: 72
End: 2022-09-15

## 2022-09-15 DIAGNOSIS — Z12.11 SCREENING FOR COLON CANCER: Primary | ICD-10-CM

## 2022-09-16 NOTE — TELEPHONE ENCOUNTER
From: Kezia Chowdhury  To: Dr. Luís Moffett  Sent: 9/15/2022 3:00 PM EDT  Subject: Cologuard    Medicare will cover the Cologuard test every 3 years. I have lost track of the amount of time that has passed since my last Cologuard test.    Please check my records for the most recent Cologuard test date it was last administered.     Thanks, Darlene Memory

## 2023-01-04 ENCOUNTER — PATIENT MESSAGE (OUTPATIENT)
Dept: INTERNAL MEDICINE CLINIC | Age: 73
End: 2023-01-04

## 2023-01-04 RX ORDER — OMEPRAZOLE 40 MG/1
40 CAPSULE, DELAYED RELEASE ORAL
Qty: 90 CAPSULE | Refills: 1 | Status: SHIPPED | OUTPATIENT
Start: 2023-01-04

## 2023-01-04 NOTE — TELEPHONE ENCOUNTER
From: Eliseo Koehler  To: Dr. Suki Ching: 1/4/2023 3:47 PM EST  Subject: OMEPRAZOLE DR 40MG CAP    Hi Yomaira,  Just a heads up that the old [address] (now [employer]) is sending in a fresh request for new refills of my OMEPRAZOLE, but instead of the 20MG, I have requested a 40MG.   Thank you,  Marni Hall

## 2023-02-07 ENCOUNTER — PATIENT MESSAGE (OUTPATIENT)
Dept: INTERNAL MEDICINE CLINIC | Age: 73
End: 2023-02-07

## 2023-02-07 ENCOUNTER — TELEPHONE (OUTPATIENT)
Dept: INTERNAL MEDICINE CLINIC | Age: 73
End: 2023-02-07

## 2023-02-07 DIAGNOSIS — L98.9 CHANGING SKIN LESION: Primary | ICD-10-CM

## 2023-02-07 NOTE — TELEPHONE ENCOUNTER
DERMATOLOGISTS  (Newest Message First)  Adri Fitzpatrick \"Dominick Aguayo\"  P [facility] [place] [address] Practice Support (supporting Marko Anglin MD) 18 hours ago (4:58 PM)     TP  If the photo file opens up for you. . you'll see a spot that has grown steadily for about a year now from a tiny little unnoticeable skin colored, non-bothersome tag-like bump, but is now dark brown and has now become somewhat sensitive to the touch.

## 2023-02-15 ENCOUNTER — TELEPHONE (OUTPATIENT)
Dept: INTERNAL MEDICINE CLINIC | Age: 73
End: 2023-02-15

## 2023-02-15 ENCOUNTER — TELEMEDICINE (OUTPATIENT)
Dept: INTERNAL MEDICINE CLINIC | Age: 73
End: 2023-02-15

## 2023-02-15 VITALS
SYSTOLIC BLOOD PRESSURE: 150 MMHG | TEMPERATURE: 97.8 F | OXYGEN SATURATION: 98 % | HEIGHT: 76 IN | HEART RATE: 66 BPM | WEIGHT: 241 LBS | DIASTOLIC BLOOD PRESSURE: 88 MMHG | BODY MASS INDEX: 29.35 KG/M2

## 2023-02-15 DIAGNOSIS — J01.90 ACUTE BACTERIAL SINUSITIS: Primary | ICD-10-CM

## 2023-02-15 DIAGNOSIS — B96.89 ACUTE BACTERIAL SINUSITIS: Primary | ICD-10-CM

## 2023-02-15 RX ORDER — AMOXICILLIN AND CLAVULANATE POTASSIUM 875; 125 MG/1; MG/1
1 TABLET, FILM COATED ORAL 2 TIMES DAILY
Qty: 14 TABLET | Refills: 0 | Status: SHIPPED | OUTPATIENT
Start: 2023-02-15 | End: 2023-02-22

## 2023-02-15 RX ORDER — PROMETHAZINE HYDROCHLORIDE AND PHENYLEPHRINE HYDROCHLORIDE 6.25; 5 MG/5ML; MG/5ML
5 SYRUP ORAL EVERY 6 HOURS
Qty: 280 ML | Refills: 0 | Status: SHIPPED | OUTPATIENT
Start: 2023-02-15

## 2023-02-15 SDOH — ECONOMIC STABILITY: FOOD INSECURITY: WITHIN THE PAST 12 MONTHS, THE FOOD YOU BOUGHT JUST DIDN'T LAST AND YOU DIDN'T HAVE MONEY TO GET MORE.: NEVER TRUE

## 2023-02-15 SDOH — ECONOMIC STABILITY: FOOD INSECURITY: WITHIN THE PAST 12 MONTHS, YOU WORRIED THAT YOUR FOOD WOULD RUN OUT BEFORE YOU GOT MONEY TO BUY MORE.: NEVER TRUE

## 2023-02-15 SDOH — ECONOMIC STABILITY: HOUSING INSECURITY
IN THE LAST 12 MONTHS, WAS THERE A TIME WHEN YOU DID NOT HAVE A STEADY PLACE TO SLEEP OR SLEPT IN A SHELTER (INCLUDING NOW)?: NO

## 2023-02-15 SDOH — ECONOMIC STABILITY: INCOME INSECURITY: HOW HARD IS IT FOR YOU TO PAY FOR THE VERY BASICS LIKE FOOD, HOUSING, MEDICAL CARE, AND HEATING?: NOT HARD AT ALL

## 2023-02-15 ASSESSMENT — PATIENT HEALTH QUESTIONNAIRE - PHQ9
1. LITTLE INTEREST OR PLEASURE IN DOING THINGS: 0
SUM OF ALL RESPONSES TO PHQ QUESTIONS 1-9: 0
2. FEELING DOWN, DEPRESSED OR HOPELESS: 0
SUM OF ALL RESPONSES TO PHQ QUESTIONS 1-9: 0
SUM OF ALL RESPONSES TO PHQ QUESTIONS 1-9: 0
SUM OF ALL RESPONSES TO PHQ9 QUESTIONS 1 & 2: 0
SUM OF ALL RESPONSES TO PHQ QUESTIONS 1-9: 0

## 2023-02-15 NOTE — PROGRESS NOTES
CHIEF COMPLAINT: Ruth Parkinson is a 67 y.o. male who presents for : Cough and sinus congestion x10 days    HPI: Patient presented with cough and sinus congestion x10 days he notes he is coughing up some brown sputum he denies shortness of breath but does note fatigue and low-grade fever    Review of Systems:   Constitutional:  Denies fever or chills   Eyes:  Denies change in visual acuity   HENT:  Denies nasal congestion or sore throat   Respiratory:  Denies cough or shortness of breath   Cardiovascular:  Denies chest pain or edema   GI:  Denies abdominal pain, nausea, vomiting, bloody stools or diarrhea   :  Denies dysuria   Musculoskeletal:  Denies back pain or joint pain   Integument:  Denies rash   Neurologic:  Denies headache, focal weakness or sensory changes   Endocrine:  Denies polyuria or polydipsia   Lymphatic:  Denies swollen glands   Psychiatric:  Denies depression or anxiety     Past Medical History:        Diagnosis Date    Chest pain 1/2/2015    Chronic GERD     ESBL (extended spectrum beta-lactamase) producing bacteria infection 09/11/2016    E.coli ESBL-8/2015    Hepatitis C virus 01/02/2015    says he received treatment    Hx of hepatitis C 1/16/2015    Hyperlipidemia     Hypertension     Hypertension     Hypertension, essential     MDRO (multiple drug resistant organisms) resistance 09/10/2016    Pseudomonas MDRO in foot 6/23/16    MDRO (multiple drug resistant organisms) resistance 9/22/17; 09/08/2017    Pseudomonas MDR in wound    Morbid obesity ([address]     Non-pressure chronic ulcer of other part of left foot limited to breakdown of skin ([address]     Non-pressure chronic ulcer of other part of right foot limited to breakdown of skin ([address]     Obstructive sleep apnea     Peripheral neuropathy 1/2/2015    Pre-operative clearance        Past Surgical History:        Procedure Laterality Date    FOOT SURGERY Bilateral 10/8/2015    left foot bone biopsy X 2, bilat wound debridement, bilat.  amnio graft    FOOT SURGERY Right 2016    TRANSMETATARSAL AMPUTATION RIGHT FOOT;    FOOT SURGERY Left 2017    INCISION AND DRAINAGE WITH EXCISION OF ALL NECROTIC SOFT TISSUE AND BONE, LEFT FOOT WITH APPLICATION OF WOUND VAC    OTHER SURGICAL HISTORY  2016    PARTIAL FIRST RAY TOE AMPUTATION - RIGHT FOOT (VANCOMYCIN    OTHER SURGICAL HISTORY Left 2017     TRANSMETATARSAL AMPUTATION LEFT FOOT ;    OTHER SURGICAL HISTORY Left 2017    INCISION AND DRAINAGE OF NECROTIC TISSUE AND BONE WITH RE-    OTHER SURGICAL HISTORY Left 2017    RESECTION FIRST METATARSAL WITH PARTIAL RESECTION SECOND,    SLEEVE GASTRECTOMY N/A 10/5/2021    ROBOTIC SLEEVE GASTRECTOMY; ROBOTIC HIATAL HERNIA REPAIR performed by Oleksandr Villafuerte DO at [address] [place] [address] N/A 2021    EGD BIOPSY performed by Oleksandr Villafuerte DO at [facility] ENDOSCOPY       Family History:  No family history on file. Social History:  Social History     Socioeconomic History    Marital status:       Spouse name: None    Number of children: None    Years of education: None    Highest education level: None   Tobacco Use    Smoking status: Former     Packs/day: 0.25     Years: 5.00     Pack years: 1.25     Types: Cigarettes     Quit date: 2016     Years since quittin.4    Smokeless tobacco: Never   Substance and Sexual Activity    Alcohol use: Not Currently     Alcohol/week: 3.0 standard drinks     Types: 3 Shots of liquor per week    Drug use: No    Sexual activity: Yes     Partners: Female     Social Determinants of Health     Financial Resource Strain: Low Risk     Difficulty of Paying Living Expenses: Not hard at all   Food Insecurity: No Food Insecurity    Worried About Running Out of Food in the Last Year: Never true    Ran Out of Food in the Last Year: Never true   Transportation Needs: Unknown    Lack of Transportation (Non-Medical): No   Housing Stability: Unknown    Unstable Housing in the Last Year: No         Allergies:  Clindamycin/lincomycin    Current Medications:    Prior to Admission medications    Medication Sig Start Date End Date Taking? Authorizing Provider   amoxicillin-clavulanate (AUGMENTIN) 875-125 MG per tablet Take 1 tablet by mouth 2 times daily for 7 days 2/15/23 2/22/23 Yes Marci Sheth MD   Promethazine-Phenylephrine [facility]) 6.25-5 MG/5ML syrup Take 5 mLs by mouth in the morning and 5 mLs at noon and 5 mLs in the evening and 5 mLs before bedtime. 2/15/23  Yes Marci Sheth MD   omeprazole (PRILOSEC) 40 MG delayed release capsule Take 1 capsule by mouth every morning (before breakfast) 1/4/23  Yes Marci Sheth MD   atenolol (TENORMIN) 50 MG tablet TAKE 1 TABLET DAILY. USE THIS PRESCRIPTION FOR THE TWO WEEKS THAT PILLS NEED TO BE CRUSHED THEN RESUME PREVIOUS PRESCRIPTION 5/16/22  Yes Marci Sheth MD       Physical Exam:  Vital Signs: BP (!) 150/88   Pulse 66   Temp 97.8 °F (36.6 °C)   Ht 6' 4\" (1.93 m)   Wt 241 lb (109.3 kg)   SpO2 98%   BMI 29.34 kg/m²   General: Patient appears  non-toxic  HENT: Atraumatic, normocephalic, oral mucosa moist erythema the nares with postnasal drip  Lungs:  Clear bilaterally  Heart: Regular rate and rhythm  Abdomen: Non-distended, soft, non-tender  Extremities: No edema  Neuro: Nonfocal    Medical Decision Making and Plan:  Pertinent Labs & Imaging studies reviewed. (See chart for details)      1.  Acute bacterial sinusitis  Start Augmentin 875 twice daily x7 days plus Phenergan VC call if symptoms do not resolve

## 2023-03-21 ENCOUNTER — PATIENT MESSAGE (OUTPATIENT)
Dept: BARIATRICS/WEIGHT MGMT | Age: 73
End: 2023-03-21

## 2023-03-21 NOTE — TELEPHONE ENCOUNTER
[facility]  [address] [place] [address], [address] [facility]  [phone]  Phone  [phone]  Fax    Lacie Argueta,    We noticed that you are due for a follow-up visit. We are interested in how you have been feeling as well as your progress! It is important to complete regular follow-up visits to monitor your health after surgery and esure the best success of your procedure. Please call the office today at [phone] to schedule an appointment. We look forward to seeing you! [facility]  [address] [place] [address], [address] [facility]  [phone]  Phone  [phone]  Fax

## 2023-03-27 ENCOUNTER — TELEPHONE (OUTPATIENT)
Dept: INTERNAL MEDICINE CLINIC | Age: 73
End: 2023-03-27

## 2023-03-29 RX ORDER — ATENOLOL 50 MG/1
TABLET ORAL
Qty: 90 TABLET | Refills: 2 | Status: SHIPPED | OUTPATIENT
Start: 2023-03-29

## 2023-06-20 RX ORDER — OMEPRAZOLE 40 MG/1
CAPSULE, DELAYED RELEASE ORAL
Qty: 90 CAPSULE | Refills: 1 | Status: SHIPPED | OUTPATIENT
Start: 2023-06-20

## 2023-07-13 ENCOUNTER — TELEPHONE (OUTPATIENT)
Dept: INTERNAL MEDICINE CLINIC | Age: 73
End: 2023-07-13

## 2023-07-13 NOTE — TELEPHONE ENCOUNTER
Are you currently taking Ambien? I do not see it on your medication list?    Should schedule for annual wellness visit.

## 2023-07-13 NOTE — TELEPHONE ENCOUNTER
Dr. Les Silveira,  After years of sleepless nights and dozens of failed attempts to find a sleep med that actually works for me (without any side effects) I finally found relief with this one. (see attached pharmacy documentation)   And my [address] [facility]) has confirmed that coverage for a 90 day supply is available at a $0 cost to me.   Thanks, Laurie Espinosa

## 2023-12-01 NOTE — TELEPHONE ENCOUNTER
Hi Ms Watkins,  Your blood tests are normal.   Screening for diabetes is negative, thyroid functions are good, kidney and liver functions are normal and cholesterol is good. Script has been sent. Patient notified.

## 2024-03-04 RX ORDER — OMEPRAZOLE 40 MG/1
CAPSULE, DELAYED RELEASE ORAL
Qty: 90 CAPSULE | Refills: 3 | OUTPATIENT
Start: 2024-03-04

## 2024-03-04 RX ORDER — ATENOLOL 50 MG/1
TABLET ORAL
Qty: 90 TABLET | Refills: 3 | OUTPATIENT
Start: 2024-03-04

## 2024-03-06 RX ORDER — OMEPRAZOLE 40 MG/1
40 CAPSULE, DELAYED RELEASE ORAL
Qty: 90 CAPSULE | Refills: 0 | Status: SHIPPED | OUTPATIENT
Start: 2024-03-06

## 2024-03-06 RX ORDER — ATENOLOL 50 MG/1
TABLET ORAL
Qty: 90 TABLET | Refills: 0 | Status: SHIPPED | OUTPATIENT
Start: 2024-03-06

## 2024-03-09 SDOH — ECONOMIC STABILITY: INCOME INSECURITY: HOW HARD IS IT FOR YOU TO PAY FOR THE VERY BASICS LIKE FOOD, HOUSING, MEDICAL CARE, AND HEATING?: VERY HARD

## 2024-03-09 SDOH — ECONOMIC STABILITY: FOOD INSECURITY: WITHIN THE PAST 12 MONTHS, YOU WORRIED THAT YOUR FOOD WOULD RUN OUT BEFORE YOU GOT MONEY TO BUY MORE.: SOMETIMES TRUE

## 2024-03-09 SDOH — ECONOMIC STABILITY: FOOD INSECURITY: WITHIN THE PAST 12 MONTHS, THE FOOD YOU BOUGHT JUST DIDN'T LAST AND YOU DIDN'T HAVE MONEY TO GET MORE.: SOMETIMES TRUE

## 2024-03-09 SDOH — HEALTH STABILITY: PHYSICAL HEALTH: ON AVERAGE, HOW MANY DAYS PER WEEK DO YOU ENGAGE IN MODERATE TO STRENUOUS EXERCISE (LIKE A BRISK WALK)?: 0 DAYS

## 2024-03-09 ASSESSMENT — PATIENT HEALTH QUESTIONNAIRE - PHQ9
SUM OF ALL RESPONSES TO PHQ QUESTIONS 1-9: 0
SUM OF ALL RESPONSES TO PHQ QUESTIONS 1-9: 0
SUM OF ALL RESPONSES TO PHQ9 QUESTIONS 1 & 2: 0
SUM OF ALL RESPONSES TO PHQ QUESTIONS 1-9: 0
2. FEELING DOWN, DEPRESSED OR HOPELESS: 0
SUM OF ALL RESPONSES TO PHQ QUESTIONS 1-9: 0
1. LITTLE INTEREST OR PLEASURE IN DOING THINGS: 0

## 2024-03-09 ASSESSMENT — LIFESTYLE VARIABLES
HOW MANY STANDARD DRINKS CONTAINING ALCOHOL DO YOU HAVE ON A TYPICAL DAY: 0
HOW OFTEN DO YOU HAVE A DRINK CONTAINING ALCOHOL: 1
HOW MANY STANDARD DRINKS CONTAINING ALCOHOL DO YOU HAVE ON A TYPICAL DAY: PATIENT DOES NOT DRINK
HOW OFTEN DO YOU HAVE SIX OR MORE DRINKS ON ONE OCCASION: 1
HOW OFTEN DO YOU HAVE A DRINK CONTAINING ALCOHOL: NEVER

## 2024-03-12 ENCOUNTER — OFFICE VISIT (OUTPATIENT)
Dept: INTERNAL MEDICINE CLINIC | Age: 74
End: 2024-03-12

## 2024-03-12 VITALS
WEIGHT: 251 LBS | SYSTOLIC BLOOD PRESSURE: 144 MMHG | HEIGHT: 76 IN | BODY MASS INDEX: 30.56 KG/M2 | DIASTOLIC BLOOD PRESSURE: 100 MMHG

## 2024-03-12 DIAGNOSIS — G60.9 IDIOPATHIC PERIPHERAL NEUROPATHY: ICD-10-CM

## 2024-03-12 DIAGNOSIS — I10 ESSENTIAL HYPERTENSION: ICD-10-CM

## 2024-03-12 DIAGNOSIS — D69.6 THROMBOCYTOPENIA, UNSPECIFIED (HCC): ICD-10-CM

## 2024-03-12 DIAGNOSIS — Z89.431 PARTIAL NONTRAUMATIC AMPUTATION OF FOOT, RIGHT (HCC): ICD-10-CM

## 2024-03-12 DIAGNOSIS — Z89.432 PARTIAL NONTRAUMATIC AMPUTATION OF LEFT FOOT (HCC): ICD-10-CM

## 2024-03-12 DIAGNOSIS — K21.9 GASTROESOPHAGEAL REFLUX DISEASE WITHOUT ESOPHAGITIS: ICD-10-CM

## 2024-03-12 DIAGNOSIS — Z98.84 H/O GASTRIC BYPASS: ICD-10-CM

## 2024-03-12 DIAGNOSIS — Z00.00 PE (PHYSICAL EXAM), ANNUAL: Primary | ICD-10-CM

## 2024-03-12 PROBLEM — E66.01 SEVERE OBESITY (BMI 35.0-39.9) WITH COMORBIDITY (HCC): Status: RESOLVED | Noted: 2021-08-26 | Resolved: 2024-03-12

## 2024-03-12 PROBLEM — S98.919A AMPUTATION OF FOOT (HCC): Status: ACTIVE | Noted: 2024-03-12

## 2024-03-12 PROBLEM — E66.01 SEVERE OBESITY (BMI 35.0-35.9 WITH COMORBIDITY) (HCC): Status: RESOLVED | Noted: 2021-10-05 | Resolved: 2024-03-12

## 2024-03-12 PROBLEM — M86.472 CHRONIC OSTEOMYELITIS OF LEFT FOOT WITH DRAINING SINUS (HCC): Status: RESOLVED | Noted: 2017-09-09 | Resolved: 2024-03-12

## 2024-03-12 RX ORDER — DOXEPIN HYDROCHLORIDE 10 MG/1
10 CAPSULE ORAL NIGHTLY
Qty: 30 CAPSULE | Refills: 5 | Status: SHIPPED | OUTPATIENT
Start: 2024-03-12

## 2024-03-12 RX ORDER — ATENOLOL AND CHLORTHALIDONE TABLET 50; 25 MG/1; MG/1
1 TABLET ORAL DAILY
Qty: 90 TABLET | Refills: 3 | Status: SHIPPED | OUTPATIENT
Start: 2024-03-12

## 2024-03-12 NOTE — PROGRESS NOTES
Medicare Annual Wellness Visit    Dominick Aguayo is here for Medicare AWV           No follow-ups on file.     Subjective       Patient's complete Health Risk Assessment and screening values have been reviewed and are found in Flowsheets. The following problems were reviewed today and where indicated follow up appointments were made and/or referrals ordered.    Positive Risk Factor Screenings with Interventions:                Activity, Diet, and Weight:  On average, how many days per week do you engage in moderate to strenuous exercise (like a brisk walk)?: 0 days       Do you eat balanced/healthy meals regularly?: Yes    Body mass index is 30.55 kg/m². (!) Abnormal      Obesity Interventions:  Patient declines any further evaluation or treatment              Vision Screen:  Do you have difficulty driving, watching TV, or doing any of your daily activities because of your eyesight?: No  Have you had an eye exam within the past year?: (!) No  No results found.    Interventions:   Patient declines any further evaluation or treatment      Advanced Directives:  Do you have a Living Will?: (!) No    Intervention:  see ACP note                     Objective   Vitals:    03/12/24 1620   BP: (!) 144/100   Weight: 113.9 kg (251 lb)   Height: 1.93 m (6' 4\")      Body mass index is 30.55 kg/m².             Allergies   Allergen Reactions    Clindamycin/Lincomycin Rash     Prior to Visit Medications    Medication Sig Taking? Authorizing Provider   atenolol (TENORMIN) 50 MG tablet TAKE 1 TABLET DAILY. USE THIS PRESCRIPTION FOR THE TWO WEEKS THAT PILLS NEED TO BE CRUSHED THEN RESUME PREVIOUS PRESCRIPTION Yes Van Egan MD   omeprazole (PRILOSEC) 40 MG delayed release capsule Take 1 capsule by mouth every morning (before breakfast) Yes Van Egan MD       [facility] (Including outside providers/suppliers regularly involved in providing care):   Patient Care Team:  Van Egan MD as PCP - General (Internal Medicine)  Julisa

## 2024-03-12 NOTE — PROGRESS NOTES
Annual Wellness Visit     Patient:  Dominick Aguayo                                               : 1950  Age: 73 y.o.  MRN: [id number]  Date : 3/12/2024      CHIEF COMPLAINT: Dominick Aguayo is a 73 y.o. male who presents for : Physical exam    1. Thrombocytopenia, unspecified  No bleeding no petechiae  - doxepin (SINEQUAN) 10 MG capsule; Take 1 capsule by mouth nightly  Dispense: 30 capsule; Refill: 5  - CBC with Auto Differential; Future  - Comprehensive Metabolic Panel; Future  - Lipid Panel; Future  - PSA Screening; Future  - TSH; Future  - Urinalysis; Future  - Vitamin D 25 Hydroxy; Future  - Fecal DNA Colorectal cancer screening (Cologuard)  - Vitamin B12; Future  - Hemoglobin A1C; Future    2. Idiopathic peripheral neuropathy  Status post partial amputation bilaterally of the feet followed by podiatry  - doxepin (SINEQUAN) 10 MG capsule; Take 1 capsule by mouth nightly  Dispense: 30 capsule; Refill: 5  - CBC with Auto Differential; Future  - Comprehensive Metabolic Panel; Future  - Lipid Panel; Future  - PSA Screening; Future  - TSH; Future  - Urinalysis; Future  - Vitamin D 25 Hydroxy; Future  - Fecal DNA Colorectal cancer screening (Cologuard)  - Vitamin B12; Future  - Hemoglobin A1C; Future    3. Essential hypertension  Blood pressure has been slightly elevated    4. Partial nontraumatic amputation of foot, right (HCC)  This problem is followed by podiatry    5. Gastroesophageal reflux disease without esophagitis  Status post gastric bypass now on Prilosec    6. H/O gastric bypass  This problem is stable  - doxepin (SINEQUAN) 10 MG capsule; Take 1 capsule by mouth nightly  Dispense: 30 capsule; Refill: 5  - CBC with Auto Differential; Future  - Comprehensive Metabolic Panel; Future  - Lipid Panel; Future  - PSA Screening; Future  - TSH; Future  - Urinalysis; Future  - Vitamin D 25 Hydroxy; Future  - Fecal DNA Colorectal cancer screening (Cologuard)  - Vitamin B12; Future  - Hemoglobin A1C;

## 2024-03-20 DIAGNOSIS — Z98.84 H/O GASTRIC BYPASS: ICD-10-CM

## 2024-03-20 DIAGNOSIS — Z00.00 PE (PHYSICAL EXAM), ANNUAL: ICD-10-CM

## 2024-03-20 DIAGNOSIS — G60.9 IDIOPATHIC PERIPHERAL NEUROPATHY: ICD-10-CM

## 2024-03-20 DIAGNOSIS — D69.6 THROMBOCYTOPENIA, UNSPECIFIED (HCC): ICD-10-CM

## 2024-03-20 LAB
BASOPHILS # BLD: 0 K/UL (ref 0–0.2)
BASOPHILS NFR BLD: 0.4 %
BILIRUB UR QL STRIP.AUTO: NEGATIVE
CLARITY UR: CLEAR
COLOR UR: ABNORMAL
DEPRECATED RDW RBC AUTO: 13.8 % (ref 12.4–15.4)
EOSINOPHIL # BLD: 0.1 K/UL (ref 0–0.6)
EOSINOPHIL NFR BLD: 1.5 %
GLUCOSE UR STRIP.AUTO-MCNC: NEGATIVE MG/DL
HCT VFR BLD AUTO: 49.2 % (ref 40.5–52.5)
HGB BLD-MCNC: 17.1 G/DL (ref 13.5–17.5)
HGB UR QL STRIP.AUTO: NEGATIVE
KETONES UR STRIP.AUTO-MCNC: NEGATIVE MG/DL
LEUKOCYTE ESTERASE UR QL STRIP.AUTO: NEGATIVE
LYMPHOCYTES # BLD: 1.3 K/UL (ref 1–5.1)
LYMPHOCYTES NFR BLD: 16.9 %
MCH RBC QN AUTO: 32.2 PG (ref 26–34)
MCHC RBC AUTO-ENTMCNC: 34.8 G/DL (ref 31–36)
MCV RBC AUTO: 92.4 FL (ref 80–100)
MONOCYTES # BLD: 0.5 K/UL (ref 0–1.3)
MONOCYTES NFR BLD: 7 %
NEUTROPHILS # BLD: 5.8 K/UL (ref 1.7–7.7)
NEUTROPHILS NFR BLD: 74.2 %
NITRITE UR QL STRIP.AUTO: NEGATIVE
PH UR STRIP.AUTO: 5.5 [PH] (ref 5–8)
PLATELET # BLD AUTO: 144 K/UL (ref 135–450)
PMV BLD AUTO: 9 FL (ref 5–10.5)
PROT UR STRIP.AUTO-MCNC: NEGATIVE MG/DL
RBC # BLD AUTO: 5.33 M/UL (ref 4.2–5.9)
SP GR UR STRIP.AUTO: 1.02 (ref 1–1.03)
UA DIPSTICK W REFLEX MICRO PNL UR: ABNORMAL
URN SPEC COLLECT METH UR: ABNORMAL
UROBILINOGEN UR STRIP-ACNC: 1 E.U./DL
WBC # BLD AUTO: 7.8 K/UL (ref 4–11)

## 2024-03-21 ENCOUNTER — TELEPHONE (OUTPATIENT)
Dept: INTERNAL MEDICINE CLINIC | Age: 74
End: 2024-03-21

## 2024-03-21 DIAGNOSIS — I10 ESSENTIAL HYPERTENSION: Primary | ICD-10-CM

## 2024-03-21 LAB
25(OH)D3 SERPL-MCNC: 39.4 NG/ML
ALBUMIN SERPL-MCNC: 4.9 G/DL (ref 3.4–5)
ALBUMIN/GLOB SERPL: 1.9 {RATIO} (ref 1.1–2.2)
ALP SERPL-CCNC: 60 U/L (ref 40–129)
ALT SERPL-CCNC: 23 U/L (ref 10–40)
ANION GAP SERPL CALCULATED.3IONS-SCNC: 14 MMOL/L (ref 3–16)
AST SERPL-CCNC: 20 U/L (ref 15–37)
BILIRUB SERPL-MCNC: 0.3 MG/DL (ref 0–1)
BUN SERPL-MCNC: 28 MG/DL (ref 7–20)
CALCIUM SERPL-MCNC: 9.6 MG/DL (ref 8.3–10.6)
CHLORIDE SERPL-SCNC: 102 MMOL/L (ref 99–110)
CHOLEST SERPL-MCNC: 186 MG/DL (ref 0–199)
CO2 SERPL-SCNC: 22 MMOL/L (ref 21–32)
CREAT SERPL-MCNC: 1.4 MG/DL (ref 0.8–1.3)
EST. AVERAGE GLUCOSE BLD GHB EST-MCNC: 102.5 MG/DL
GFR SERPLBLD CREATININE-BSD FMLA CKD-EPI: 53 ML/MIN/{1.73_M2}
GLUCOSE SERPL-MCNC: 108 MG/DL (ref 70–99)
HBA1C MFR BLD: 5.2 %
HDLC SERPL-MCNC: 41 MG/DL (ref 40–60)
LDLC SERPL CALC-MCNC: 104 MG/DL
POTASSIUM SERPL-SCNC: 4.8 MMOL/L (ref 3.5–5.1)
PROT SERPL-MCNC: 7.5 G/DL (ref 6.4–8.2)
PSA SERPL DL<=0.01 NG/ML-MCNC: 9.1 NG/ML (ref 0–4)
SODIUM SERPL-SCNC: 138 MMOL/L (ref 136–145)
TRIGL SERPL-MCNC: 207 MG/DL (ref 0–150)
TSH SERPL DL<=0.005 MIU/L-ACNC: 2.72 UIU/ML (ref 0.27–4.2)
VIT B12 SERPL-MCNC: 703 PG/ML (ref 211–911)
VLDLC SERPL CALC-MCNC: 41 MG/DL

## 2024-03-21 NOTE — TELEPHONE ENCOUNTER
Pharm needs to know which script is the most current and correct one. One from 3-6-24 and one from 3-12-24. atenolol-chlorthalidone (TENORETIC 50) 50-25 MG per tablet [[id number]]          Please call and advise

## 2024-03-22 NOTE — TELEPHONE ENCOUNTER
Call returned.    Atenolol - Chlorthalidone is the correct prescription.    Pharmacy has been notified.

## 2024-04-30 ENCOUNTER — TELEPHONE (OUTPATIENT)
Dept: INTERNAL MEDICINE CLINIC | Age: 74
End: 2024-04-30

## 2024-04-30 NOTE — TELEPHONE ENCOUNTER
tenolol/chlorthalidone  (Newest Message First)  View All Conversations on this Encounter  Dominick Aguayo \"Dominick Aguayo\"  P [facility] [address] Practice Support (supporting Van Egan MD)18 hours ago (2:47 PM)     TP  Dr. Egan,     GOOD NEWS!      One critical item we addressed from last months annual physical was to bring my blood pressure down with the newly prescribed atenolol with chlorthalidone.      I just had my blood pressure taken at Dr. Lane's office and my blood pressure was way down from last months visit to your office when it read 152/93.      Dr. Lane's reading was 121/77 so thank you for making that adjustment.      On the downside, I still don't sleep for more than an hour or two at a time but at least my blood pressure is in check.      Appreciatively yours,   Ajith Aguayo 1950

## 2024-05-14 RX ORDER — OMEPRAZOLE 40 MG/1
40 CAPSULE, DELAYED RELEASE ORAL
Qty: 90 CAPSULE | Refills: 3 | Status: SHIPPED | OUTPATIENT
Start: 2024-05-14

## 2024-06-11 LAB — NONINV COLON CA DNA+OCC BLD SCRN STL QL: NEGATIVE

## 2024-09-21 ENCOUNTER — APPOINTMENT (OUTPATIENT)
Dept: GENERAL RADIOLOGY | Age: 74
DRG: 477 | End: 2024-09-21
Payer: MEDICARE

## 2024-09-21 ENCOUNTER — HOSPITAL ENCOUNTER (INPATIENT)
Age: 74
LOS: 10 days | Discharge: NURSING, REHAB OR LONG-TERM CARE | DRG: 477 | End: 2024-10-01
Attending: STUDENT IN AN ORGANIZED HEALTH CARE EDUCATION/TRAINING PROGRAM | Admitting: HOSPITALIST
Payer: MEDICARE

## 2024-09-21 DIAGNOSIS — M86.9 OSTEOMYELITIS, UNSPECIFIED SITE, UNSPECIFIED TYPE: ICD-10-CM

## 2024-09-21 DIAGNOSIS — L97.529 ULCER OF LEFT FOOT, UNSPECIFIED ULCER STAGE (HCC): Primary | ICD-10-CM

## 2024-09-21 DIAGNOSIS — E11.52 GANGRENE ASSOCIATED WITH DIABETES MELLITUS (HCC): ICD-10-CM

## 2024-09-21 DIAGNOSIS — M79.89 NECROTIZING SOFT TISSUE INFECTION: ICD-10-CM

## 2024-09-21 LAB
ALBUMIN SERPL-MCNC: 3.7 G/DL (ref 3.4–5)
ALBUMIN/GLOB SERPL: 1 {RATIO} (ref 1.1–2.2)
ALP SERPL-CCNC: 83 U/L (ref 40–129)
ALT SERPL-CCNC: 45 U/L (ref 10–40)
ANION GAP SERPL CALCULATED.3IONS-SCNC: 16 MMOL/L (ref 3–16)
AST SERPL-CCNC: 39 U/L (ref 15–37)
BASOPHILS # BLD: 0 K/UL (ref 0–0.2)
BASOPHILS NFR BLD: 0.3 %
BILIRUB SERPL-MCNC: 0.9 MG/DL (ref 0–1)
BUN SERPL-MCNC: 34 MG/DL (ref 7–20)
CALCIUM SERPL-MCNC: 8.9 MG/DL (ref 8.3–10.6)
CHLORIDE SERPL-SCNC: 90 MMOL/L (ref 99–110)
CO2 SERPL-SCNC: 24 MMOL/L (ref 21–32)
CREAT SERPL-MCNC: 1.8 MG/DL (ref 0.8–1.3)
CRP SERPL-MCNC: 324.1 MG/L (ref 0–5.1)
DEPRECATED RDW RBC AUTO: 15.1 % (ref 12.4–15.4)
EOSINOPHIL # BLD: 0 K/UL (ref 0–0.6)
EOSINOPHIL NFR BLD: 0.3 %
ERYTHROCYTE [SEDIMENTATION RATE] IN BLOOD BY WESTERGREN METHOD: 90 MM/HR (ref 0–20)
FLUAV RNA RESP QL NAA+PROBE: NOT DETECTED
FLUBV RNA RESP QL NAA+PROBE: NOT DETECTED
GFR SERPLBLD CREATININE-BSD FMLA CKD-EPI: 39 ML/MIN/{1.73_M2}
GLUCOSE SERPL-MCNC: 124 MG/DL (ref 70–99)
HCT VFR BLD AUTO: 43.1 % (ref 40.5–52.5)
HGB BLD-MCNC: 14.3 G/DL (ref 13.5–17.5)
LACTATE BLDV-SCNC: 1.3 MMOL/L (ref 0.4–1.9)
LACTATE BLDV-SCNC: 1.4 MMOL/L (ref 0.4–1.9)
LYMPHOCYTES # BLD: 0.8 K/UL (ref 1–5.1)
LYMPHOCYTES NFR BLD: 6.9 %
MAGNESIUM SERPL-MCNC: 2.1 MG/DL (ref 1.8–2.4)
MCH RBC QN AUTO: 30.3 PG (ref 26–34)
MCHC RBC AUTO-ENTMCNC: 33.2 G/DL (ref 31–36)
MCV RBC AUTO: 91.5 FL (ref 80–100)
MONOCYTES # BLD: 1.3 K/UL (ref 0–1.3)
MONOCYTES NFR BLD: 10.2 %
NEUTROPHILS # BLD: 10 K/UL (ref 1.7–7.7)
NEUTROPHILS NFR BLD: 82.3 %
PLATELET # BLD AUTO: 145 K/UL (ref 135–450)
PMV BLD AUTO: 8.6 FL (ref 5–10.5)
POTASSIUM SERPL-SCNC: 3.1 MMOL/L (ref 3.5–5.1)
PROT SERPL-MCNC: 7.4 G/DL (ref 6.4–8.2)
RBC # BLD AUTO: 4.71 M/UL (ref 4.2–5.9)
SARS-COV-2 RNA RESP QL NAA+PROBE: NOT DETECTED
SODIUM SERPL-SCNC: 130 MMOL/L (ref 136–145)
WBC # BLD AUTO: 12.2 K/UL (ref 4–11)

## 2024-09-21 PROCEDURE — 83605 ASSAY OF LACTIC ACID: CPT

## 2024-09-21 PROCEDURE — 86140 C-REACTIVE PROTEIN: CPT

## 2024-09-21 PROCEDURE — 73630 X-RAY EXAM OF FOOT: CPT

## 2024-09-21 PROCEDURE — 6360000002 HC RX W HCPCS: Performed by: STUDENT IN AN ORGANIZED HEALTH CARE EDUCATION/TRAINING PROGRAM

## 2024-09-21 PROCEDURE — 99285 EMERGENCY DEPT VISIT HI MDM: CPT

## 2024-09-21 PROCEDURE — 83735 ASSAY OF MAGNESIUM: CPT

## 2024-09-21 PROCEDURE — 0KBW0ZZ EXCISION OF LEFT FOOT MUSCLE, OPEN APPROACH: ICD-10-PCS | Performed by: PODIATRIST

## 2024-09-21 PROCEDURE — 2580000003 HC RX 258

## 2024-09-21 PROCEDURE — 2580000003 HC RX 258: Performed by: STUDENT IN AN ORGANIZED HEALTH CARE EDUCATION/TRAINING PROGRAM

## 2024-09-21 PROCEDURE — 96374 THER/PROPH/DIAG INJ IV PUSH: CPT

## 2024-09-21 PROCEDURE — 80053 COMPREHEN METABOLIC PANEL: CPT

## 2024-09-21 PROCEDURE — 87636 SARSCOV2 & INF A&B AMP PRB: CPT

## 2024-09-21 PROCEDURE — 36415 COLL VENOUS BLD VENIPUNCTURE: CPT

## 2024-09-21 PROCEDURE — 6370000000 HC RX 637 (ALT 250 FOR IP)

## 2024-09-21 PROCEDURE — 87205 SMEAR GRAM STAIN: CPT

## 2024-09-21 PROCEDURE — 85025 COMPLETE CBC W/AUTO DIFF WBC: CPT

## 2024-09-21 PROCEDURE — 85652 RBC SED RATE AUTOMATED: CPT

## 2024-09-21 PROCEDURE — 1200000000 HC SEMI PRIVATE

## 2024-09-21 PROCEDURE — 87076 CULTURE ANAEROBE IDENT EACH: CPT

## 2024-09-21 PROCEDURE — 87070 CULTURE OTHR SPECIMN AEROBIC: CPT

## 2024-09-21 PROCEDURE — 93005 ELECTROCARDIOGRAM TRACING: CPT | Performed by: STUDENT IN AN ORGANIZED HEALTH CARE EDUCATION/TRAINING PROGRAM

## 2024-09-21 PROCEDURE — 6360000002 HC RX W HCPCS

## 2024-09-21 PROCEDURE — 87075 CULTR BACTERIA EXCEPT BLOOD: CPT

## 2024-09-21 RX ORDER — SODIUM CHLORIDE 9 MG/ML
INJECTION, SOLUTION INTRAVENOUS CONTINUOUS
Status: DISCONTINUED | OUTPATIENT
Start: 2024-09-21 | End: 2024-09-24

## 2024-09-21 RX ORDER — SILVER SULFADIAZINE 10 MG/G
CREAM TOPICAL
COMMUNITY

## 2024-09-21 RX ORDER — ACETAMINOPHEN 325 MG/1
650 TABLET ORAL EVERY 6 HOURS PRN
Status: DISCONTINUED | OUTPATIENT
Start: 2024-09-21 | End: 2024-10-01 | Stop reason: HOSPADM

## 2024-09-21 RX ORDER — SODIUM CHLORIDE 0.9 % (FLUSH) 0.9 %
5-40 SYRINGE (ML) INJECTION PRN
Status: DISCONTINUED | OUTPATIENT
Start: 2024-09-21 | End: 2024-10-01 | Stop reason: HOSPADM

## 2024-09-21 RX ORDER — ENOXAPARIN SODIUM 100 MG/ML
30 INJECTION SUBCUTANEOUS 2 TIMES DAILY
Status: DISCONTINUED | OUTPATIENT
Start: 2024-09-21 | End: 2024-09-23

## 2024-09-21 RX ORDER — ATENOLOL 50 MG/1
50 TABLET ORAL DAILY
Status: DISCONTINUED | OUTPATIENT
Start: 2024-09-22 | End: 2024-10-01 | Stop reason: HOSPADM

## 2024-09-21 RX ORDER — ONDANSETRON 2 MG/ML
4 INJECTION INTRAMUSCULAR; INTRAVENOUS EVERY 6 HOURS PRN
Status: DISCONTINUED | OUTPATIENT
Start: 2024-09-21 | End: 2024-10-01 | Stop reason: HOSPADM

## 2024-09-21 RX ORDER — CLINDAMYCIN PHOSPHATE 900 MG/50ML
900 INJECTION, SOLUTION INTRAVENOUS ONCE
Status: COMPLETED | OUTPATIENT
Start: 2024-09-21 | End: 2024-09-21

## 2024-09-21 RX ORDER — DEXTROSE MONOHYDRATE 100 MG/ML
INJECTION, SOLUTION INTRAVENOUS CONTINUOUS PRN
Status: DISCONTINUED | OUTPATIENT
Start: 2024-09-21 | End: 2024-10-01 | Stop reason: HOSPADM

## 2024-09-21 RX ORDER — CLINDAMYCIN PHOSPHATE 900 MG/50ML
900 INJECTION, SOLUTION INTRAVENOUS EVERY 8 HOURS
Status: DISCONTINUED | OUTPATIENT
Start: 2024-09-22 | End: 2024-09-24 | Stop reason: ALTCHOICE

## 2024-09-21 RX ORDER — ONDANSETRON 4 MG/1
4 TABLET, ORALLY DISINTEGRATING ORAL EVERY 8 HOURS PRN
Status: DISCONTINUED | OUTPATIENT
Start: 2024-09-21 | End: 2024-10-01 | Stop reason: HOSPADM

## 2024-09-21 RX ORDER — 0.9 % SODIUM CHLORIDE 0.9 %
1000 INTRAVENOUS SOLUTION INTRAVENOUS ONCE
Status: COMPLETED | OUTPATIENT
Start: 2024-09-21 | End: 2024-09-21

## 2024-09-21 RX ORDER — INSULIN LISPRO 100 [IU]/ML
0-4 INJECTION, SOLUTION INTRAVENOUS; SUBCUTANEOUS
Status: DISCONTINUED | OUTPATIENT
Start: 2024-09-22 | End: 2024-09-28

## 2024-09-21 RX ORDER — SULFAMETHOXAZOLE/TRIMETHOPRIM 800-160 MG
1 TABLET ORAL 2 TIMES DAILY
Status: ON HOLD | COMMUNITY
Start: 2024-07-10 | End: 2024-09-27 | Stop reason: HOSPADM

## 2024-09-21 RX ORDER — POTASSIUM CHLORIDE 7.45 MG/ML
10 INJECTION INTRAVENOUS
Status: COMPLETED | OUTPATIENT
Start: 2024-09-21 | End: 2024-09-22

## 2024-09-21 RX ORDER — POLYETHYLENE GLYCOL 3350 17 G/17G
17 POWDER, FOR SOLUTION ORAL DAILY PRN
Status: DISCONTINUED | OUTPATIENT
Start: 2024-09-21 | End: 2024-10-01 | Stop reason: HOSPADM

## 2024-09-21 RX ORDER — INSULIN LISPRO 100 [IU]/ML
0-4 INJECTION, SOLUTION INTRAVENOUS; SUBCUTANEOUS NIGHTLY
Status: DISCONTINUED | OUTPATIENT
Start: 2024-09-21 | End: 2024-09-28

## 2024-09-21 RX ORDER — CHLORTHALIDONE 25 MG/1
25 TABLET ORAL DAILY
Status: DISCONTINUED | OUTPATIENT
Start: 2024-09-22 | End: 2024-10-01 | Stop reason: HOSPADM

## 2024-09-21 RX ORDER — SODIUM CHLORIDE, SODIUM LACTATE, POTASSIUM CHLORIDE, CALCIUM CHLORIDE 600; 310; 30; 20 MG/100ML; MG/100ML; MG/100ML; MG/100ML
INJECTION, SOLUTION INTRAVENOUS CONTINUOUS
Status: DISCONTINUED | OUTPATIENT
Start: 2024-09-21 | End: 2024-09-21

## 2024-09-21 RX ORDER — SODIUM CHLORIDE 0.9 % (FLUSH) 0.9 %
5-40 SYRINGE (ML) INJECTION EVERY 12 HOURS SCHEDULED
Status: DISCONTINUED | OUTPATIENT
Start: 2024-09-21 | End: 2024-10-01 | Stop reason: HOSPADM

## 2024-09-21 RX ORDER — DOXEPIN HYDROCHLORIDE 10 MG/1
10 CAPSULE ORAL NIGHTLY
Status: DISCONTINUED | OUTPATIENT
Start: 2024-09-21 | End: 2024-10-01 | Stop reason: HOSPADM

## 2024-09-21 RX ORDER — GLUCAGON 1 MG/ML
1 KIT INJECTION PRN
Status: DISCONTINUED | OUTPATIENT
Start: 2024-09-21 | End: 2024-10-01 | Stop reason: HOSPADM

## 2024-09-21 RX ORDER — SODIUM CHLORIDE 9 MG/ML
INJECTION, SOLUTION INTRAVENOUS PRN
Status: DISCONTINUED | OUTPATIENT
Start: 2024-09-21 | End: 2024-10-01 | Stop reason: HOSPADM

## 2024-09-21 RX ORDER — PANTOPRAZOLE SODIUM 40 MG/1
40 TABLET, DELAYED RELEASE ORAL
Status: DISCONTINUED | OUTPATIENT
Start: 2024-09-22 | End: 2024-10-01 | Stop reason: HOSPADM

## 2024-09-21 RX ORDER — LIDOCAINE HYDROCHLORIDE 10 MG/ML
10 INJECTION, SOLUTION INFILTRATION; PERINEURAL ONCE
Status: DISCONTINUED | OUTPATIENT
Start: 2024-09-21 | End: 2024-09-21

## 2024-09-21 RX ORDER — ACETAMINOPHEN 650 MG/1
650 SUPPOSITORY RECTAL EVERY 6 HOURS PRN
Status: DISCONTINUED | OUTPATIENT
Start: 2024-09-21 | End: 2024-10-01 | Stop reason: HOSPADM

## 2024-09-21 RX ORDER — ATENOLOL AND CHLORTHALIDONE TABLET 50; 25 MG/1; MG/1
1 TABLET ORAL DAILY
Status: DISCONTINUED | OUTPATIENT
Start: 2024-09-21 | End: 2024-09-21 | Stop reason: CLARIF

## 2024-09-21 RX ADMIN — CLINDAMYCIN PHOSPHATE 900 MG: 900 INJECTION, SOLUTION INTRAVENOUS at 18:16

## 2024-09-21 RX ADMIN — DOXEPIN HYDROCHLORIDE 10 MG: 10 CAPSULE ORAL at 23:14

## 2024-09-21 RX ADMIN — VANCOMYCIN HYDROCHLORIDE 2500 MG: 10 INJECTION, POWDER, LYOPHILIZED, FOR SOLUTION INTRAVENOUS at 20:37

## 2024-09-21 RX ADMIN — ENOXAPARIN SODIUM 30 MG: 100 INJECTION SUBCUTANEOUS at 21:00

## 2024-09-21 RX ADMIN — SODIUM CHLORIDE, PRESERVATIVE FREE 10 ML: 5 INJECTION INTRAVENOUS at 20:38

## 2024-09-21 RX ADMIN — CEFEPIME 1000 MG: 1 INJECTION, POWDER, FOR SOLUTION INTRAMUSCULAR; INTRAVENOUS at 17:37

## 2024-09-21 RX ADMIN — SODIUM CHLORIDE 1000 ML: 9 INJECTION, SOLUTION INTRAVENOUS at 17:33

## 2024-09-21 ASSESSMENT — PAIN DESCRIPTION - LOCATION: LOCATION: HEAD

## 2024-09-21 ASSESSMENT — LIFESTYLE VARIABLES: HOW OFTEN DO YOU HAVE A DRINK CONTAINING ALCOHOL: NEVER

## 2024-09-21 ASSESSMENT — PAIN DESCRIPTION - DESCRIPTORS: DESCRIPTORS: ACHING

## 2024-09-21 ASSESSMENT — PAIN SCALES - GENERAL: PAINLEVEL_OUTOF10: 5

## 2024-09-21 ASSESSMENT — PAIN - FUNCTIONAL ASSESSMENT: PAIN_FUNCTIONAL_ASSESSMENT: 0-10

## 2024-09-21 NOTE — ED PROVIDER NOTES
THE [facility]  EMERGENCY DEPARTMENT ENCOUNTER          ATTENDING PHYSICIAN NOTE       Date of evaluation: 9/21/2024    Chief Complaint     Shortness of Breath and Headache (Pt presents for SOB and headache since Monday. Pt has several complaints that are listed on paper that he brought.)      History of Present Illness     Dominick Aguayo is a 74 y.o. male who presents with past medical history of hypertension, hyperlipidemia, chronic foot ulcers presenting with fever, chills, shortness of breath, leg pain.  He states he has been on Bactrim for the last month that is prescribed from his podiatrist for a chronically infected foot ulcer.  He also has been feeling chills, short of breath, headache for the last few days.  He has not taken any medication for the symptoms.    ASSESSMENT / PLAN  (MEDICAL DECISION MAKING)     INITIAL VITALS: BP: 117/69, Temp: 97.8 °F (36.6 °C), Pulse: 60, Respirations: 15, SpO2: 92 %      Dominick Aguayo is a 74 y.o. male with past medical history of hypertension, hyperlipidemia, and chronic foot ulcers presenting with fevers, chills, shortness of breath, and leg pain..    On examination, patient has a very clearly infected ulcer on the plantar surface of his left partially amputated foot.  He states he has been on Bactrim for the last month for this but it seems to be getting worse.    Notably, patient's inflammatory markers are up with a sed rate of 90 and a white count of 12.2 as well as a CRP in the 300s.  He was empirically started on vancomycin and cefepime.    X-ray is concerning for gas-forming infection.  Plan to start the patient on clindamycin.  Of note, the patient does have a documented allergy to clindamycin with a rash in 2016.  Discussed this allergy with pharmacy and given that there is no other alternative option to suppress toxin formation other than clindamycin, will proceed with caution and evaluate the patient closely while administering

## 2024-09-21 NOTE — ED NOTES
How does patient ambulate?   []Low Fall Risk (ambulates by themselves without support)  []Stand by assist   []Contact Guard   []Front wheel walker  []Wheelchair   []Steady  []Bed bound  []History of Lower Extremity Amputation  [x]Unknown, did not assess in the emergency department   How does patient take pills?  [x]Whole with Water  []Crushed in applesauce  []Crushed in pudding  []Other  []Unknown no oral medications were given in the ED  Is patient alert?   [x]Alert  []Drowsy but responds to voice  []Doesn't respond to voice but responds to painful stimuli  []Unresponsive  Is patient oriented?   [x]To person  [x]To place  [x]To time  [x]To situation  []Confused  []Agitated  [x]Follows commands  If patient is disoriented or from a Skill Nursing Facility has family been notified of admission?   []Yes   [x]No  Patient belongings?   [x]Cell phone  [x]Wallet   []Dentures  [x]Clothing  Any specific patient or family belongings/needs/dynamics?   NA  Miscellaneous comments/pending orders?  NA    If there are any additional questions please reach out to the Emergency Department.            You Hernandez RN  09/21/24 5159

## 2024-09-22 ENCOUNTER — APPOINTMENT (OUTPATIENT)
Dept: GENERAL RADIOLOGY | Age: 74
DRG: 477 | End: 2024-09-22
Payer: MEDICARE

## 2024-09-22 ENCOUNTER — APPOINTMENT (OUTPATIENT)
Dept: MRI IMAGING | Age: 74
DRG: 477 | End: 2024-09-22
Payer: MEDICARE

## 2024-09-22 LAB
ALBUMIN SERPL-MCNC: 2.8 G/DL (ref 3.4–5)
ALP SERPL-CCNC: 69 U/L (ref 40–129)
ALT SERPL-CCNC: 32 U/L (ref 10–40)
ANION GAP SERPL CALCULATED.3IONS-SCNC: 14 MMOL/L (ref 3–16)
AST SERPL-CCNC: 27 U/L (ref 15–37)
BASOPHILS # BLD: 0 K/UL (ref 0–0.2)
BASOPHILS NFR BLD: 0.3 %
BILIRUB DIRECT SERPL-MCNC: 0.3 MG/DL (ref 0–0.3)
BILIRUB INDIRECT SERPL-MCNC: 0.4 MG/DL (ref 0–1)
BILIRUB SERPL-MCNC: 0.7 MG/DL (ref 0–1)
BUN SERPL-MCNC: 33 MG/DL (ref 7–20)
CALCIUM SERPL-MCNC: 8.5 MG/DL (ref 8.3–10.6)
CHLORIDE SERPL-SCNC: 98 MMOL/L (ref 99–110)
CO2 SERPL-SCNC: 22 MMOL/L (ref 21–32)
CREAT SERPL-MCNC: 1.3 MG/DL (ref 0.8–1.3)
DEPRECATED RDW RBC AUTO: 15.4 % (ref 12.4–15.4)
EKG ATRIAL RATE: 60 BPM
EKG DIAGNOSIS: NORMAL
EKG P AXIS: 66 DEGREES
EKG P-R INTERVAL: 184 MS
EKG Q-T INTERVAL: 476 MS
EKG QRS DURATION: 128 MS
EKG QTC CALCULATION (BAZETT): 476 MS
EKG R AXIS: -67 DEGREES
EKG T AXIS: 72 DEGREES
EKG VENTRICULAR RATE: 60 BPM
EOSINOPHIL # BLD: 0.1 K/UL (ref 0–0.6)
EOSINOPHIL NFR BLD: 0.9 %
GFR SERPLBLD CREATININE-BSD FMLA CKD-EPI: 57 ML/MIN/{1.73_M2}
GLUCOSE BLD-MCNC: 100 MG/DL (ref 70–99)
GLUCOSE BLD-MCNC: 101 MG/DL (ref 70–99)
GLUCOSE BLD-MCNC: 107 MG/DL (ref 70–99)
GLUCOSE BLD-MCNC: 142 MG/DL (ref 70–99)
GLUCOSE BLD-MCNC: 92 MG/DL (ref 70–99)
GLUCOSE SERPL-MCNC: 86 MG/DL (ref 70–99)
HCT VFR BLD AUTO: 38.9 % (ref 40.5–52.5)
HGB BLD-MCNC: 12.9 G/DL (ref 13.5–17.5)
LYMPHOCYTES # BLD: 1.1 K/UL (ref 1–5.1)
LYMPHOCYTES NFR BLD: 13.1 %
MAGNESIUM SERPL-MCNC: 2.1 MG/DL (ref 1.8–2.4)
MCH RBC QN AUTO: 30.8 PG (ref 26–34)
MCHC RBC AUTO-ENTMCNC: 33.2 G/DL (ref 31–36)
MCV RBC AUTO: 92.7 FL (ref 80–100)
MONOCYTES # BLD: 0.9 K/UL (ref 0–1.3)
MONOCYTES NFR BLD: 11.6 %
NEUTROPHILS # BLD: 6 K/UL (ref 1.7–7.7)
NEUTROPHILS NFR BLD: 74.1 %
PERFORMED ON: ABNORMAL
PERFORMED ON: NORMAL
PHOSPHATE SERPL-MCNC: 3.2 MG/DL (ref 2.5–4.9)
PLATELET # BLD AUTO: 113 K/UL (ref 135–450)
PMV BLD AUTO: 8.5 FL (ref 5–10.5)
POTASSIUM SERPL-SCNC: 3.4 MMOL/L (ref 3.5–5.1)
PROT SERPL-MCNC: 6.5 G/DL (ref 6.4–8.2)
RBC # BLD AUTO: 4.19 M/UL (ref 4.2–5.9)
SODIUM SERPL-SCNC: 134 MMOL/L (ref 136–145)
WBC # BLD AUTO: 8.1 K/UL (ref 4–11)

## 2024-09-22 PROCEDURE — 85025 COMPLETE CBC W/AUTO DIFF WBC: CPT

## 2024-09-22 PROCEDURE — 6370000000 HC RX 637 (ALT 250 FOR IP)

## 2024-09-22 PROCEDURE — 6360000002 HC RX W HCPCS

## 2024-09-22 PROCEDURE — 2580000003 HC RX 258

## 2024-09-22 PROCEDURE — 80076 HEPATIC FUNCTION PANEL: CPT

## 2024-09-22 PROCEDURE — 73718 MRI LOWER EXTREMITY W/O DYE: CPT

## 2024-09-22 PROCEDURE — 99222 1ST HOSP IP/OBS MODERATE 55: CPT | Performed by: HOSPITALIST

## 2024-09-22 PROCEDURE — 1200000000 HC SEMI PRIVATE

## 2024-09-22 PROCEDURE — 71046 X-RAY EXAM CHEST 2 VIEWS: CPT

## 2024-09-22 PROCEDURE — 83735 ASSAY OF MAGNESIUM: CPT

## 2024-09-22 PROCEDURE — 83036 HEMOGLOBIN GLYCOSYLATED A1C: CPT

## 2024-09-22 PROCEDURE — 80069 RENAL FUNCTION PANEL: CPT

## 2024-09-22 PROCEDURE — 36415 COLL VENOUS BLD VENIPUNCTURE: CPT

## 2024-09-22 RX ADMIN — POTASSIUM CHLORIDE 10 MEQ: 10 INJECTION, SOLUTION INTRAVENOUS at 01:31

## 2024-09-22 RX ADMIN — PANTOPRAZOLE SODIUM 40 MG: 40 TABLET, DELAYED RELEASE ORAL at 06:12

## 2024-09-22 RX ADMIN — CEFEPIME 2000 MG: 2 INJECTION, POWDER, FOR SOLUTION INTRAVENOUS at 15:46

## 2024-09-22 RX ADMIN — CEFEPIME 2000 MG: 2 INJECTION, POWDER, FOR SOLUTION INTRAVENOUS at 03:27

## 2024-09-22 RX ADMIN — CLINDAMYCIN PHOSPHATE 900 MG: 900 INJECTION, SOLUTION INTRAVENOUS at 02:25

## 2024-09-22 RX ADMIN — DOXEPIN HYDROCHLORIDE 10 MG: 10 CAPSULE ORAL at 21:52

## 2024-09-22 RX ADMIN — ENOXAPARIN SODIUM 30 MG: 100 INJECTION SUBCUTANEOUS at 08:17

## 2024-09-22 RX ADMIN — SODIUM CHLORIDE: 9 INJECTION, SOLUTION INTRAVENOUS at 00:29

## 2024-09-22 RX ADMIN — SODIUM CHLORIDE: 9 INJECTION, SOLUTION INTRAVENOUS at 04:14

## 2024-09-22 RX ADMIN — SODIUM CHLORIDE: 9 INJECTION, SOLUTION INTRAVENOUS at 19:38

## 2024-09-22 RX ADMIN — POTASSIUM CHLORIDE 10 MEQ: 10 INJECTION, SOLUTION INTRAVENOUS at 03:21

## 2024-09-22 RX ADMIN — POTASSIUM CHLORIDE 10 MEQ: 10 INJECTION, SOLUTION INTRAVENOUS at 00:31

## 2024-09-22 RX ADMIN — CLINDAMYCIN PHOSPHATE 900 MG: 900 INJECTION, SOLUTION INTRAVENOUS at 17:59

## 2024-09-22 RX ADMIN — POTASSIUM BICARBONATE 40 MEQ: 782 TABLET, EFFERVESCENT ORAL at 09:19

## 2024-09-22 RX ADMIN — CLINDAMYCIN PHOSPHATE 900 MG: 900 INJECTION, SOLUTION INTRAVENOUS at 09:21

## 2024-09-22 RX ADMIN — CEFEPIME 2000 MG: 2 INJECTION, POWDER, FOR SOLUTION INTRAVENOUS at 21:52

## 2024-09-22 RX ADMIN — SODIUM CHLORIDE: 9 INJECTION, SOLUTION INTRAVENOUS at 00:18

## 2024-09-22 ASSESSMENT — PAIN SCALES - GENERAL
PAINLEVEL_OUTOF10: 2
PAINLEVEL_OUTOF10: 0
PAINLEVEL_OUTOF10: 0

## 2024-09-22 NOTE — H&P
results for input(s): \"INR\" in the last 72 hours.    COVID-19:   Recent Labs     09/21/24  1638   COVID19 NOT DETECTED         Radiology:  XR FOOT LEFT (MIN 3 VIEWS)   Final Result      Status post transmetatarsal amputation. Soft tissue swelling and gas seen at the stump, particularly at the medial aspect may represent gas forming soft tissue infection or ulceration. Slight cortical irregularity, cannot exclude osteomyelitis.      Electronically signed by Van Clark      XR FOOT LEFT (MIN 3 VIEWS)    (Results Pending)       Assessment & Plan   Dominick Aguayo is a 74 y.o. male with PMHx T2DM, HTN, HLD, chronic hep C who p/w malaise.    #Diabetic gangrene, left foot (POA)  #T2DM w/ peripheral neuropathy (POA)  Chronic nonhealing diabetic wounds on chronic suppression abx by outpatient Podiatry. 6 days of worsening malaise. Labs, imaging, and exam consistent with abscess possible gangrene. Ddx includes abscess and osteomyelitis.  - Podiatry consulted for urgent I&D  - Clindamycin, cefepime, and vancomycin  - NPO until I&D  - Not on home insulin, LDSSI ordered    #DEBBIE (POA)  Baseline creatinine 0.9 but 1.8 on admission. Patient reported nausea suspect poor PO intake.   - IV hydration  - Holding chlorthalidone    #Chronic hepatitis C (POA)  LFTs elevated more than on previous labs this year. Patient discussing hep C treatment with PCP.  - Continue discussion inpatient  - If he is amendable will consider a GI or ID consult    #HTN (POA)  Takes atenolol-chlorthalidone at home.  - Atenolol ordered  - Chlorthalidone held per above    DVT PPx: Lovenox  Diet: ADULT DIET; Regular; 4 carb choices (60 gm/meal)  Code status:  Full Code    Estimated time to discharge: 3-4 day(s)  Barriers to discharge: Wound cultures for abx dispo planning  Disposition  - Preadmission: Home  - Current: [facility]  - Upon discharge: TBD    Will discuss with attending physician Patrick Chaney MD.    Yonathan Medina,   Internal Medicine,

## 2024-09-23 ENCOUNTER — ANESTHESIA EVENT (OUTPATIENT)
Dept: OPERATING ROOM | Age: 74
End: 2024-09-23
Payer: MEDICARE

## 2024-09-23 ENCOUNTER — ANESTHESIA (OUTPATIENT)
Dept: OPERATING ROOM | Age: 74
End: 2024-09-23
Payer: MEDICARE

## 2024-09-23 ENCOUNTER — APPOINTMENT (OUTPATIENT)
Dept: VASCULAR LAB | Age: 74
DRG: 477 | End: 2024-09-23
Payer: MEDICARE

## 2024-09-23 ENCOUNTER — APPOINTMENT (OUTPATIENT)
Dept: GENERAL RADIOLOGY | Age: 74
DRG: 477 | End: 2024-09-23
Payer: MEDICARE

## 2024-09-23 LAB
ALBUMIN SERPL-MCNC: 3 G/DL (ref 3.4–5)
ALP SERPL-CCNC: 78 U/L (ref 40–129)
ALT SERPL-CCNC: 39 U/L (ref 10–40)
ANION GAP SERPL CALCULATED.3IONS-SCNC: 10 MMOL/L (ref 3–16)
AST SERPL-CCNC: 36 U/L (ref 15–37)
BASOPHILS # BLD: 0 K/UL (ref 0–0.2)
BASOPHILS NFR BLD: 0.3 %
BILIRUB DIRECT SERPL-MCNC: 0.3 MG/DL (ref 0–0.3)
BILIRUB INDIRECT SERPL-MCNC: 0.3 MG/DL (ref 0–1)
BILIRUB SERPL-MCNC: 0.6 MG/DL (ref 0–1)
BUN SERPL-MCNC: 21 MG/DL (ref 7–20)
CALCIUM SERPL-MCNC: 8.4 MG/DL (ref 8.3–10.6)
CHLORIDE SERPL-SCNC: 99 MMOL/L (ref 99–110)
CO2 SERPL-SCNC: 26 MMOL/L (ref 21–32)
CREAT SERPL-MCNC: 1.1 MG/DL (ref 0.8–1.3)
CRP SERPL-MCNC: 228.7 MG/L (ref 0–5.1)
DEPRECATED RDW RBC AUTO: 15.1 % (ref 12.4–15.4)
ECHO BSA: 2.45 M2
EOSINOPHIL # BLD: 0.2 K/UL (ref 0–0.6)
EOSINOPHIL NFR BLD: 2.9 %
ERYTHROCYTE [SEDIMENTATION RATE] IN BLOOD BY WESTERGREN METHOD: 64 MM/HR (ref 0–20)
EST. AVERAGE GLUCOSE BLD GHB EST-MCNC: 111.2 MG/DL
GFR SERPLBLD CREATININE-BSD FMLA CKD-EPI: 70 ML/MIN/{1.73_M2}
GLUCOSE BLD-MCNC: 103 MG/DL (ref 70–99)
GLUCOSE BLD-MCNC: 141 MG/DL (ref 70–99)
GLUCOSE BLD-MCNC: 97 MG/DL (ref 70–99)
GLUCOSE BLD-MCNC: 97 MG/DL (ref 70–99)
GLUCOSE SERPL-MCNC: 92 MG/DL (ref 70–99)
HBA1C MFR BLD: 5.5 %
HCT VFR BLD AUTO: 38.8 % (ref 40.5–52.5)
HGB BLD-MCNC: 13 G/DL (ref 13.5–17.5)
INR PPP: 1.26 (ref 0.85–1.15)
LYMPHOCYTES # BLD: 1 K/UL (ref 1–5.1)
LYMPHOCYTES NFR BLD: 14.4 %
MAGNESIUM SERPL-MCNC: 1.9 MG/DL (ref 1.8–2.4)
MCH RBC QN AUTO: 30.9 PG (ref 26–34)
MCHC RBC AUTO-ENTMCNC: 33.6 G/DL (ref 31–36)
MCV RBC AUTO: 91.9 FL (ref 80–100)
MONOCYTES # BLD: 0.7 K/UL (ref 0–1.3)
MONOCYTES NFR BLD: 9.7 %
NEUTROPHILS # BLD: 5 K/UL (ref 1.7–7.7)
NEUTROPHILS NFR BLD: 72.7 %
PERFORMED ON: ABNORMAL
PERFORMED ON: ABNORMAL
PERFORMED ON: NORMAL
PERFORMED ON: NORMAL
PHOSPHATE SERPL-MCNC: 3 MG/DL (ref 2.5–4.9)
PLATELET # BLD AUTO: 152 K/UL (ref 135–450)
PMV BLD AUTO: 8.3 FL (ref 5–10.5)
POTASSIUM SERPL-SCNC: 3.8 MMOL/L (ref 3.5–5.1)
PREALB SERPL-MCNC: 7 MG/DL (ref 20–40)
PROT SERPL-MCNC: 6.2 G/DL (ref 6.4–8.2)
PROTHROMBIN TIME: 16 SEC (ref 11.9–14.9)
RBC # BLD AUTO: 4.22 M/UL (ref 4.2–5.9)
SODIUM SERPL-SCNC: 135 MMOL/L (ref 136–145)
VAS LEFT ABI: 1.01
VAS LEFT ARM BP: 140 MMHG
VAS LEFT ATA DIST PSV: 102 CM/S
VAS LEFT CFA DIST PSV: 112 CM/S
VAS LEFT CFA PROX PSV: 97.5 CM/S
VAS LEFT DORSALIS PEDIS BP: 142 MMHG
VAS LEFT PERONEAL MID PSV: 125 CM/S
VAS LEFT PFA PROX PSV: 65 CM/S
VAS LEFT POP A DIST PSV: 120 CM/S
VAS LEFT POP A PROX PSV: 132 CM/S
VAS LEFT POP A PROX VEL RATIO: 0.59
VAS LEFT PTA BP: 130 MMHG
VAS LEFT PTA DIST PSV: 95.6 CM/S
VAS LEFT PTA MID PSV: 87.8 CM/S
VAS LEFT SFA DIST PSV: 223 CM/S
VAS LEFT SFA DIST VEL RATIO: 1.99
VAS LEFT SFA MID PSV: 112 CM/S
VAS LEFT SFA MID VEL RATIO: 0.72
VAS LEFT SFA PROX PSV: 156 CM/S
VAS LEFT SFA PROX VEL RATIO: 1.39
VAS RIGHT ABI: 1.03
VAS RIGHT ARM BP: 138 MMHG
VAS RIGHT ATA DIST PSV: 51 CM/S
VAS RIGHT CFA DIST PSV: 60.9 CM/S
VAS RIGHT CFA PROX PSV: 94.1 CM/S
VAS RIGHT DORSALIS PEDIS BP: 136 MMHG
VAS RIGHT PERONEAL MID PSV: 41.7 CM/S
VAS RIGHT PFA PROX PSV: 87.6 CM/S
VAS RIGHT POP A DIST PSV: 55.7 CM/S
VAS RIGHT POP A PROX PSV: 39.9 CM/S
VAS RIGHT POP A PROX VEL RATIO: 0.46
VAS RIGHT PTA BP: 144 MMHG
VAS RIGHT PTA DIST PSV: 87.3 CM/S
VAS RIGHT PTA MID PSV: 89.5 CM/S
VAS RIGHT SFA DIST PSV: 87.6 CM/S
VAS RIGHT SFA DIST VEL RATIO: 1.32
VAS RIGHT SFA MID PSV: 66.5 CM/S
VAS RIGHT SFA MID VEL RATIO: 1
VAS RIGHT SFA PROX PSV: 68.3 CM/S
VAS RIGHT SFA PROX VEL RATIO: 0.7
WBC # BLD AUTO: 6.8 K/UL (ref 4–11)

## 2024-09-23 PROCEDURE — 6360000002 HC RX W HCPCS: Performed by: PODIATRIST

## 2024-09-23 PROCEDURE — 80069 RENAL FUNCTION PANEL: CPT

## 2024-09-23 PROCEDURE — 88304 TISSUE EXAM BY PATHOLOGIST: CPT

## 2024-09-23 PROCEDURE — 93925 LOWER EXTREMITY STUDY: CPT | Performed by: SURGERY

## 2024-09-23 PROCEDURE — 7100000001 HC PACU RECOVERY - ADDTL 15 MIN: Performed by: PODIATRIST

## 2024-09-23 PROCEDURE — 83735 ASSAY OF MAGNESIUM: CPT

## 2024-09-23 PROCEDURE — 3700000001 HC ADD 15 MINUTES (ANESTHESIA): Performed by: PODIATRIST

## 2024-09-23 PROCEDURE — 0QBP0ZZ EXCISION OF LEFT METATARSAL, OPEN APPROACH: ICD-10-PCS | Performed by: PODIATRIST

## 2024-09-23 PROCEDURE — 99232 SBSQ HOSP IP/OBS MODERATE 35: CPT | Performed by: HOSPITALIST

## 2024-09-23 PROCEDURE — 2500000003 HC RX 250 WO HCPCS: Performed by: PODIATRIST

## 2024-09-23 PROCEDURE — 0MCT0ZZ EXTIRPATION OF MATTER FROM LEFT FOOT BURSA AND LIGAMENT, OPEN APPROACH: ICD-10-PCS | Performed by: PODIATRIST

## 2024-09-23 PROCEDURE — 84134 ASSAY OF PREALBUMIN: CPT

## 2024-09-23 PROCEDURE — 6360000002 HC RX W HCPCS

## 2024-09-23 PROCEDURE — 3600000002 HC SURGERY LEVEL 2 BASE: Performed by: PODIATRIST

## 2024-09-23 PROCEDURE — 2709999900 HC NON-CHARGEABLE SUPPLY: Performed by: PODIATRIST

## 2024-09-23 PROCEDURE — 36415 COLL VENOUS BLD VENIPUNCTURE: CPT

## 2024-09-23 PROCEDURE — 88305 TISSUE EXAM BY PATHOLOGIST: CPT

## 2024-09-23 PROCEDURE — 87070 CULTURE OTHR SPECIMN AEROBIC: CPT

## 2024-09-23 PROCEDURE — 87186 SC STD MICRODIL/AGAR DIL: CPT

## 2024-09-23 PROCEDURE — A4217 STERILE WATER/SALINE, 500 ML: HCPCS | Performed by: PODIATRIST

## 2024-09-23 PROCEDURE — 88311 DECALCIFY TISSUE: CPT

## 2024-09-23 PROCEDURE — 2580000003 HC RX 258: Performed by: NURSE ANESTHETIST, CERTIFIED REGISTERED

## 2024-09-23 PROCEDURE — 2580000003 HC RX 258: Performed by: PODIATRIST

## 2024-09-23 PROCEDURE — 85652 RBC SED RATE AUTOMATED: CPT

## 2024-09-23 PROCEDURE — 87076 CULTURE ANAEROBE IDENT EACH: CPT

## 2024-09-23 PROCEDURE — 80076 HEPATIC FUNCTION PANEL: CPT

## 2024-09-23 PROCEDURE — 86140 C-REACTIVE PROTEIN: CPT

## 2024-09-23 PROCEDURE — 6360000002 HC RX W HCPCS: Performed by: NURSE ANESTHETIST, CERTIFIED REGISTERED

## 2024-09-23 PROCEDURE — 87102 FUNGUS ISOLATION CULTURE: CPT

## 2024-09-23 PROCEDURE — 2500000003 HC RX 250 WO HCPCS: Performed by: NURSE ANESTHETIST, CERTIFIED REGISTERED

## 2024-09-23 PROCEDURE — 85025 COMPLETE CBC W/AUTO DIFF WBC: CPT

## 2024-09-23 PROCEDURE — 85610 PROTHROMBIN TIME: CPT

## 2024-09-23 PROCEDURE — 93925 LOWER EXTREMITY STUDY: CPT

## 2024-09-23 PROCEDURE — 1200000000 HC SEMI PRIVATE

## 2024-09-23 PROCEDURE — 73630 X-RAY EXAM OF FOOT: CPT

## 2024-09-23 PROCEDURE — 7100000000 HC PACU RECOVERY - FIRST 15 MIN: Performed by: PODIATRIST

## 2024-09-23 PROCEDURE — 6370000000 HC RX 637 (ALT 250 FOR IP)

## 2024-09-23 PROCEDURE — 3600000012 HC SURGERY LEVEL 2 ADDTL 15MIN: Performed by: PODIATRIST

## 2024-09-23 PROCEDURE — 87205 SMEAR GRAM STAIN: CPT

## 2024-09-23 PROCEDURE — 2580000003 HC RX 258

## 2024-09-23 PROCEDURE — 3700000000 HC ANESTHESIA ATTENDED CARE: Performed by: PODIATRIST

## 2024-09-23 PROCEDURE — 0QBP0ZX EXCISION OF LEFT METATARSAL, OPEN APPROACH, DIAGNOSTIC: ICD-10-PCS | Performed by: PODIATRIST

## 2024-09-23 PROCEDURE — 87077 CULTURE AEROBIC IDENTIFY: CPT

## 2024-09-23 RX ORDER — ROCURONIUM BROMIDE 10 MG/ML
INJECTION, SOLUTION INTRAVENOUS
Status: DISCONTINUED | OUTPATIENT
Start: 2024-09-23 | End: 2024-09-23 | Stop reason: SDUPTHER

## 2024-09-23 RX ORDER — HYDROMORPHONE HYDROCHLORIDE 1 MG/ML
0.5 INJECTION, SOLUTION INTRAMUSCULAR; INTRAVENOUS; SUBCUTANEOUS EVERY 5 MIN PRN
Status: DISCONTINUED | OUTPATIENT
Start: 2024-09-23 | End: 2024-09-23

## 2024-09-23 RX ORDER — MAGNESIUM HYDROXIDE 1200 MG/15ML
LIQUID ORAL CONTINUOUS PRN
Status: DISCONTINUED | OUTPATIENT
Start: 2024-09-23 | End: 2024-09-23 | Stop reason: HOSPADM

## 2024-09-23 RX ORDER — ACETAMINOPHEN 325 MG/1
650 TABLET ORAL
Status: DISCONTINUED | OUTPATIENT
Start: 2024-09-23 | End: 2024-09-23

## 2024-09-23 RX ORDER — SODIUM CHLORIDE 0.9 % (FLUSH) 0.9 %
5-40 SYRINGE (ML) INJECTION EVERY 12 HOURS SCHEDULED
Status: DISCONTINUED | OUTPATIENT
Start: 2024-09-23 | End: 2024-09-23

## 2024-09-23 RX ORDER — SODIUM CHLORIDE, SODIUM LACTATE, POTASSIUM CHLORIDE, CALCIUM CHLORIDE 600; 310; 30; 20 MG/100ML; MG/100ML; MG/100ML; MG/100ML
INJECTION, SOLUTION INTRAVENOUS
Status: DISCONTINUED | OUTPATIENT
Start: 2024-09-23 | End: 2024-09-23 | Stop reason: SDUPTHER

## 2024-09-23 RX ORDER — IPRATROPIUM BROMIDE AND ALBUTEROL SULFATE 2.5; .5 MG/3ML; MG/3ML
1 SOLUTION RESPIRATORY (INHALATION)
Status: DISCONTINUED | OUTPATIENT
Start: 2024-09-23 | End: 2024-09-23

## 2024-09-23 RX ORDER — NALOXONE HYDROCHLORIDE 0.4 MG/ML
INJECTION, SOLUTION INTRAMUSCULAR; INTRAVENOUS; SUBCUTANEOUS PRN
Status: DISCONTINUED | OUTPATIENT
Start: 2024-09-23 | End: 2024-09-23

## 2024-09-23 RX ORDER — DEXMEDETOMIDINE HYDROCHLORIDE 100 UG/ML
INJECTION, SOLUTION INTRAVENOUS
Status: DISCONTINUED | OUTPATIENT
Start: 2024-09-23 | End: 2024-09-23 | Stop reason: SDUPTHER

## 2024-09-23 RX ORDER — LIDOCAINE HYDROCHLORIDE 20 MG/ML
INJECTION, SOLUTION INTRAVENOUS
Status: DISCONTINUED | OUTPATIENT
Start: 2024-09-23 | End: 2024-09-23 | Stop reason: SDUPTHER

## 2024-09-23 RX ORDER — SODIUM CHLORIDE 9 MG/ML
INJECTION, SOLUTION INTRAVENOUS PRN
Status: DISCONTINUED | OUTPATIENT
Start: 2024-09-23 | End: 2024-09-23

## 2024-09-23 RX ORDER — ONDANSETRON 2 MG/ML
INJECTION INTRAMUSCULAR; INTRAVENOUS
Status: DISCONTINUED | OUTPATIENT
Start: 2024-09-23 | End: 2024-09-23 | Stop reason: SDUPTHER

## 2024-09-23 RX ORDER — LIDOCAINE HYDROCHLORIDE 20 MG/ML
INJECTION, SOLUTION INFILTRATION; PERINEURAL PRN
Status: DISCONTINUED | OUTPATIENT
Start: 2024-09-23 | End: 2024-09-23 | Stop reason: HOSPADM

## 2024-09-23 RX ORDER — PROPOFOL 10 MG/ML
INJECTION, EMULSION INTRAVENOUS
Status: DISCONTINUED | OUTPATIENT
Start: 2024-09-23 | End: 2024-09-23 | Stop reason: SDUPTHER

## 2024-09-23 RX ORDER — BUPIVACAINE HYDROCHLORIDE 5 MG/ML
INJECTION, SOLUTION EPIDURAL; INTRACAUDAL PRN
Status: DISCONTINUED | OUTPATIENT
Start: 2024-09-23 | End: 2024-09-23 | Stop reason: HOSPADM

## 2024-09-23 RX ORDER — SUCCINYLCHOLINE/SOD CL,ISO/PF 200MG/10ML
SYRINGE (ML) INTRAVENOUS
Status: DISCONTINUED | OUTPATIENT
Start: 2024-09-23 | End: 2024-09-23 | Stop reason: SDUPTHER

## 2024-09-23 RX ORDER — ENOXAPARIN SODIUM 100 MG/ML
30 INJECTION SUBCUTANEOUS 2 TIMES DAILY
Status: DISCONTINUED | OUTPATIENT
Start: 2024-09-24 | End: 2024-10-01 | Stop reason: HOSPADM

## 2024-09-23 RX ORDER — FENTANYL CITRATE 50 UG/ML
INJECTION, SOLUTION INTRAMUSCULAR; INTRAVENOUS
Status: DISCONTINUED | OUTPATIENT
Start: 2024-09-23 | End: 2024-09-23 | Stop reason: SDUPTHER

## 2024-09-23 RX ORDER — FENTANYL CITRATE 50 UG/ML
25 INJECTION, SOLUTION INTRAMUSCULAR; INTRAVENOUS EVERY 5 MIN PRN
Status: DISCONTINUED | OUTPATIENT
Start: 2024-09-23 | End: 2024-09-23

## 2024-09-23 RX ORDER — SODIUM CHLORIDE 0.9 % (FLUSH) 0.9 %
5-40 SYRINGE (ML) INJECTION PRN
Status: DISCONTINUED | OUTPATIENT
Start: 2024-09-23 | End: 2024-09-23

## 2024-09-23 RX ORDER — ONDANSETRON 2 MG/ML
4 INJECTION INTRAMUSCULAR; INTRAVENOUS
Status: DISCONTINUED | OUTPATIENT
Start: 2024-09-23 | End: 2024-09-23

## 2024-09-23 RX ORDER — PROCHLORPERAZINE EDISYLATE 5 MG/ML
5 INJECTION INTRAMUSCULAR; INTRAVENOUS
Status: DISCONTINUED | OUTPATIENT
Start: 2024-09-23 | End: 2024-09-23

## 2024-09-23 RX ORDER — LABETALOL HYDROCHLORIDE 5 MG/ML
10 INJECTION, SOLUTION INTRAVENOUS
Status: DISCONTINUED | OUTPATIENT
Start: 2024-09-23 | End: 2024-09-23

## 2024-09-23 RX ADMIN — FENTANYL CITRATE 50 MCG: 50 INJECTION, SOLUTION INTRAMUSCULAR; INTRAVENOUS at 11:18

## 2024-09-23 RX ADMIN — SODIUM CHLORIDE, PRESERVATIVE FREE 10 ML: 5 INJECTION INTRAVENOUS at 07:52

## 2024-09-23 RX ADMIN — CEFEPIME 2000 MG: 2 INJECTION, POWDER, FOR SOLUTION INTRAVENOUS at 06:06

## 2024-09-23 RX ADMIN — SODIUM CHLORIDE: 9 INJECTION, SOLUTION INTRAVENOUS at 06:01

## 2024-09-23 RX ADMIN — ROCURONIUM BROMIDE 10 MG: 10 INJECTION, SOLUTION INTRAVENOUS at 11:31

## 2024-09-23 RX ADMIN — ONDANSETRON 4 MG: 2 INJECTION INTRAMUSCULAR; INTRAVENOUS at 12:18

## 2024-09-23 RX ADMIN — SUGAMMADEX 200 MG: 100 INJECTION, SOLUTION INTRAVENOUS at 12:19

## 2024-09-23 RX ADMIN — PANTOPRAZOLE SODIUM 40 MG: 40 TABLET, DELAYED RELEASE ORAL at 06:06

## 2024-09-23 RX ADMIN — CLINDAMYCIN PHOSPHATE 900 MG: 900 INJECTION, SOLUTION INTRAVENOUS at 02:33

## 2024-09-23 RX ADMIN — Medication 140 MG: at 11:31

## 2024-09-23 RX ADMIN — ROCURONIUM BROMIDE 40 MG: 10 INJECTION, SOLUTION INTRAVENOUS at 11:44

## 2024-09-23 RX ADMIN — CLINDAMYCIN PHOSPHATE 900 MG: 900 INJECTION, SOLUTION INTRAVENOUS at 09:04

## 2024-09-23 RX ADMIN — LIDOCAINE HYDROCHLORIDE 50 MG: 20 INJECTION, SOLUTION INTRAVENOUS at 11:31

## 2024-09-23 RX ADMIN — DEXMEDETOMIDINE HYDROCHLORIDE 6 MCG: 100 INJECTION, SOLUTION INTRAVENOUS at 12:19

## 2024-09-23 RX ADMIN — CEFEPIME 2000 MG: 2 INJECTION, POWDER, FOR SOLUTION INTRAVENOUS at 13:57

## 2024-09-23 RX ADMIN — CLINDAMYCIN PHOSPHATE 900 MG: 900 INJECTION, SOLUTION INTRAVENOUS at 18:04

## 2024-09-23 RX ADMIN — FENTANYL CITRATE 50 MCG: 50 INJECTION, SOLUTION INTRAMUSCULAR; INTRAVENOUS at 11:47

## 2024-09-23 RX ADMIN — PROPOFOL 150 MG: 10 INJECTION, EMULSION INTRAVENOUS at 11:31

## 2024-09-23 RX ADMIN — SODIUM CHLORIDE, SODIUM LACTATE, POTASSIUM CHLORIDE, AND CALCIUM CHLORIDE: .6; .31; .03; .02 INJECTION, SOLUTION INTRAVENOUS at 11:08

## 2024-09-23 ASSESSMENT — PAIN SCALES - GENERAL
PAINLEVEL_OUTOF10: 0
PAINLEVEL_OUTOF10: 0

## 2024-09-23 NOTE — FLOWSHEET NOTE
PACU Transfer Note    Vitals:    09/23/24 1308   BP: 110/5898.7   Pulse: 60   Resp: 17   Temp: 98.5   SpO2: 93       In: 700 [I.V.:700]  Out: 335 [Urine:300]    Pain assessment:  none   pt transported to room in bed with RA per pacu transport   Pain Level: 0    Report given to Receiving unit RN.    9/23/2024 1:15 PM

## 2024-09-23 NOTE — CARE COORDINATION
Case Management Assessment  Initial Evaluation    Date/Time of Evaluation: 9/23/2024 4:54 PM  Assessment Completed by: Thania Rivera RN    If patient is discharged prior to next notation, then this note serves as note for discharge by case management.    Patient Name: Dominick Aguayo                   YOB: 1950  Diagnosis: Gangrene associated with diabetes mellitus (HCC) [E11.52]  Ulcer of left foot, unspecified ulcer stage (HCC) [L97.529]                   Date / Time: 9/21/2024  3:53 PM    Patient Admission Status: Inpatient   Readmission Risk (Low < 19, Mod (19-27), High > 27): Readmission Risk Score: 9.2    Current PCP: Van Egan MD  PCP verified by CM? Yes    Chart Reviewed: Yes      History Provided by: Patient  Patient Orientation: Alert and Oriented    Patient Cognition: Alert    Hospitalization in the last 30 days (Readmission):  No    If yes, Readmission Assessment in  Navigator will be completed.    Advance Directives:      Code Status: Full Code   Patient's Primary Decision Maker is: Legal Next of Kin      Discharge Planning:    Patient lives with: Alone Type of Home: Apartment  Primary Care Giver: Self  Patient Support Systems include: Friends/Neighbors   Current Financial resources: Medicare  Current community resources: None  Current services prior to admission: None            Current DME:              Type of Home Care services:  OT, PT, Nursing Services    ADLS  Prior functional level: Independent in ADLs/IADLs  Current functional level: Assistance with the following:, Mobility, Cooking, Housework    PT AM-PAC:   /24  OT AM-PAC:   /24    Family can provide assistance at DC: No  Would you like Case Management to discuss the discharge plan with any other family members/significant others, and if so, who? No  Plans to Return to Present Housing: Unknown at present  Other Identified Issues/Barriers to RETURNING to current housing: TBD  Potential Assistance needed at discharge:

## 2024-09-23 NOTE — PERIOP NOTE
Pre-Operative Note  Resident Note     Patient: Dominick Aguayo      Procedure: Incision and drainage of left foot with possible resection of osteomyelitic bone    Consent: In chart     Labs:        Recent Labs     09/22/24  0431 09/23/24  0617   WBC 8.1 6.8   HGB 12.9* 13.0*   HCT 38.9* 38.8*   MCV 92.7 91.9   * 152        Recent Labs     09/22/24  0431 09/23/24  0617   * 135*   K 3.4* 3.8   CL 98* 99   CO2 22 26   PHOS 3.2 3.0   BUN 33* 21*   CREATININE 1.3 1.1        Recent Labs     09/22/24  0431 09/23/24  0617   AST 27 36   ALT 32 39   BILIDIR 0.3 0.3   BILITOT 0.7 0.6   ALKPHOS 69 78      No results for input(s): \"LIPASE\", \"AMYLASE\" in the last 72 hours.     Recent Labs     09/23/24  0617   INR 1.26*      No results for input(s): \"CKTOTAL\", \"CKMB\", \"CKMBINDEX\", \"TROPONINI\" in the last 72 hours.    Saline lock/IV access: Yes    Clearance for surgery: Yes per medicine     Diet: NPO at midnight    CXR: normal      EKG: normal sinus rhythm, no blocks or conduction defects, no ischemic changes     Echocardiogram: not done    PT/INR: 16.0/1.2    IVF: Normal saline    Abx: Cefepime, clindamycin, vancomycin (scheduled)    Type and Screen: Not indicated hemoglobin 13.0 at 06 17 this am    Known risk factors for perioperative complications: Diabetes mellitus  Hepatic dysfunction      Anesthesia to see patient    You Virk DPM, MS    Podiatric Resident, PGY-1   [employer]   Pager Number: [phone]  9/23/2024

## 2024-09-23 NOTE — BRIEF OP NOTE
Brief Postoperative Note      Patient: Dominick Aguayo  YOB: 1950  MRN: [id number]    Date of Procedure: 9/23/2024    Pre-Op Diagnosis Codes:      * Necrotizing soft tissue infection [M79.89]    Post-Op Diagnosis: Same       Procedure(s):  INCISION AND DRAINAGE LEFT FOOT WITH RESECTION OF OSTEOMYELYTIC BONE  BONE BIOPSY  APPLICATION OF WOUND VAC     Surgeon(s):  Marisol Yates DPM    Assistant:  Resident: David Stephens DPM  Student: Daniel Best MS4    Anesthesia: General    Hemostasis: anatomic dissection and electrocautery    Injectables: Pre-Op 20 cc of 2% Lidocaine plain and Post-Op 30 cc of 0.5 % Marcaine plain    Materials: 3-0 Nylon    Implants: None      Estimated Blood Loss (mL): less than 50     Complications: None    Specimens:   ID Type Source Tests Collected by Time Destination   1 : ABSCESS OF LEFT FOOT Tissue Tissue CULTURE, FUNGUS, CULTURE, TISSUE, CULTURE WITH SMEAR, ACID FAST BACILLIUS Marisol Yates DPM 9/23/2024 1153    A : BURSA Tissue Tissue SURGICAL PATHOLOGY Marisol Yates DPM 9/23/2024 1147    B : LEFT FOOT METATARSAL Bone Bone SURGICAL PATHOLOGY Marisol Yates DPM 9/23/2024 1157        Implants:  * No implants in log *      Drains:   Negative Pressure Wound Therapy Foot Left;Anterior (Active)       [REMOVED] Negative Pressure Wound Therapy Foot Left (Removed)       Findings:  Infection Present At Time Of Surgery (PATOS) (choose all levels that have infection present):  - Organ Space infection (below fascia) present as evidenced by osteomyelitis  Other Findings: Dorsolateral wound measures 3.5 x 3.5 x 1cm after debridement. Dorsolateral wound communicates with the plantolateral wound. Exposed necrotic periosteum noted within the wound. Hematoma noted just distal medial to the dorsolateral wound. No purulence expressed. Adventitious bursa noted to the plantolateral wound. Limited intra-operative bleeding noted.    DISPO: S/p left foot I&D with wound vac harley. Continue IV

## 2024-09-23 NOTE — OP NOTE
fascia, and bone, the ulceration measured 3.5 cm x 3.5 cm and was approximately 1 cm deep.  The wound was then irrigated with 3 L of normal sterile saline via pulse lavage.  Retention sutures were made over the extended incisions to prevent retraction of the wound edges and a wound VAC was placed at 125 mmHg over both the plantar and dorsal ulceration.  Upon completion of this, additional 30 mL of 0.5% Marcaine plain was administered throughout the surgical incision site.  The wound was then dressed with a dry sterile dressing.  The patient had previously had noninvasive laser perfusion studies performed both in 2015 and 2017.  The 2017 study showed that he had a dorsal laser perfusion pressure of 46 which although was adequate for healing, was significantly reduced from the previous study performed in 2015, so we will repeat the studies to ensure the patient has adequate peripheral circulation for healing as he did not have a significant amount of intraoperative bleeding.          MARISOL YATES DPM      D:  09/23/2024 12:34:25     T:  09/23/2024 18:35:29     LORETTA/SRAVANTHI  Job #:  [id number]     Doc#:  [id number]    CC:   Marisol Yates DPM

## 2024-09-24 LAB
ALBUMIN SERPL-MCNC: 2.9 G/DL (ref 3.4–5)
ALP SERPL-CCNC: 112 U/L (ref 40–129)
ALT SERPL-CCNC: 58 U/L (ref 10–40)
ANION GAP SERPL CALCULATED.3IONS-SCNC: 14 MMOL/L (ref 3–16)
AST SERPL-CCNC: 56 U/L (ref 15–37)
BACTERIA SPEC AEROBE CULT: ABNORMAL
BACTERIA SPEC ANAEROBE CULT: ABNORMAL
BASOPHILS # BLD: 0 K/UL (ref 0–0.2)
BASOPHILS NFR BLD: 0.3 %
BILIRUB DIRECT SERPL-MCNC: 0.3 MG/DL (ref 0–0.3)
BILIRUB INDIRECT SERPL-MCNC: 0.5 MG/DL (ref 0–1)
BILIRUB SERPL-MCNC: 0.8 MG/DL (ref 0–1)
BUN SERPL-MCNC: 14 MG/DL (ref 7–20)
CALCIUM SERPL-MCNC: 8.6 MG/DL (ref 8.3–10.6)
CHLORIDE SERPL-SCNC: 97 MMOL/L (ref 99–110)
CO2 SERPL-SCNC: 22 MMOL/L (ref 21–32)
CREAT SERPL-MCNC: 1 MG/DL (ref 0.8–1.3)
CRP SERPL-MCNC: 190.9 MG/L (ref 0–5.1)
DEPRECATED RDW RBC AUTO: 15.4 % (ref 12.4–15.4)
EOSINOPHIL # BLD: 0.2 K/UL (ref 0–0.6)
EOSINOPHIL NFR BLD: 2.8 %
ERYTHROCYTE [SEDIMENTATION RATE] IN BLOOD BY WESTERGREN METHOD: 96 MM/HR (ref 0–20)
GFR SERPLBLD CREATININE-BSD FMLA CKD-EPI: 78 ML/MIN/{1.73_M2}
GLUCOSE BLD-MCNC: 102 MG/DL (ref 70–99)
GLUCOSE BLD-MCNC: 104 MG/DL (ref 70–99)
GLUCOSE BLD-MCNC: 111 MG/DL (ref 70–99)
GLUCOSE BLD-MCNC: 119 MG/DL (ref 70–99)
GLUCOSE SERPL-MCNC: 87 MG/DL (ref 70–99)
GRAM STN SPEC: ABNORMAL
HCT VFR BLD AUTO: 55.3 % (ref 40.5–52.5)
HGB BLD-MCNC: 18.2 G/DL (ref 13.5–17.5)
LOEFFLER MB STN SPEC: NORMAL
LYMPHOCYTES # BLD: 0.7 K/UL (ref 1–5.1)
LYMPHOCYTES NFR BLD: 13.4 %
MAGNESIUM SERPL-MCNC: 1.8 MG/DL (ref 1.8–2.4)
MCH RBC QN AUTO: 30.2 PG (ref 26–34)
MCHC RBC AUTO-ENTMCNC: 32.9 G/DL (ref 31–36)
MCV RBC AUTO: 91.8 FL (ref 80–100)
MONOCYTES # BLD: 0.4 K/UL (ref 0–1.3)
MONOCYTES NFR BLD: 7.5 %
NEUTROPHILS # BLD: 4.2 K/UL (ref 1.7–7.7)
NEUTROPHILS NFR BLD: 76 %
ORGANISM: ABNORMAL
PERFORMED ON: ABNORMAL
PHOSPHATE SERPL-MCNC: 2.7 MG/DL (ref 2.5–4.9)
PLATELET # BLD AUTO: 123 K/UL (ref 135–450)
PMV BLD AUTO: 8.2 FL (ref 5–10.5)
POTASSIUM SERPL-SCNC: 4.4 MMOL/L (ref 3.5–5.1)
PROT SERPL-MCNC: 6.7 G/DL (ref 6.4–8.2)
RBC # BLD AUTO: 6.03 M/UL (ref 4.2–5.9)
REASON FOR REJECTION: NORMAL
REJECTED TEST: NORMAL
SODIUM SERPL-SCNC: 133 MMOL/L (ref 136–145)
VANCOMYCIN SERPL-MCNC: 10.4 UG/ML
WBC # BLD AUTO: 5.5 K/UL (ref 4–11)

## 2024-09-24 PROCEDURE — 97166 OT EVAL MOD COMPLEX 45 MIN: CPT

## 2024-09-24 PROCEDURE — 99223 1ST HOSP IP/OBS HIGH 75: CPT | Performed by: INTERNAL MEDICINE

## 2024-09-24 PROCEDURE — 97530 THERAPEUTIC ACTIVITIES: CPT

## 2024-09-24 PROCEDURE — 80202 ASSAY OF VANCOMYCIN: CPT

## 2024-09-24 PROCEDURE — 97535 SELF CARE MNGMENT TRAINING: CPT

## 2024-09-24 PROCEDURE — 2580000003 HC RX 258

## 2024-09-24 PROCEDURE — 2580000003 HC RX 258: Performed by: INTERNAL MEDICINE

## 2024-09-24 PROCEDURE — 6360000002 HC RX W HCPCS: Performed by: INTERNAL MEDICINE

## 2024-09-24 PROCEDURE — 6370000000 HC RX 637 (ALT 250 FOR IP)

## 2024-09-24 PROCEDURE — 83735 ASSAY OF MAGNESIUM: CPT

## 2024-09-24 PROCEDURE — 85025 COMPLETE CBC W/AUTO DIFF WBC: CPT

## 2024-09-24 PROCEDURE — 6360000002 HC RX W HCPCS

## 2024-09-24 PROCEDURE — 86140 C-REACTIVE PROTEIN: CPT

## 2024-09-24 PROCEDURE — 80076 HEPATIC FUNCTION PANEL: CPT

## 2024-09-24 PROCEDURE — 1200000000 HC SEMI PRIVATE

## 2024-09-24 PROCEDURE — 97116 GAIT TRAINING THERAPY: CPT

## 2024-09-24 PROCEDURE — 97162 PT EVAL MOD COMPLEX 30 MIN: CPT

## 2024-09-24 PROCEDURE — 85652 RBC SED RATE AUTOMATED: CPT

## 2024-09-24 PROCEDURE — 80069 RENAL FUNCTION PANEL: CPT

## 2024-09-24 PROCEDURE — 99232 SBSQ HOSP IP/OBS MODERATE 35: CPT | Performed by: HOSPITALIST

## 2024-09-24 PROCEDURE — 36415 COLL VENOUS BLD VENIPUNCTURE: CPT

## 2024-09-24 RX ORDER — SODIUM CHLORIDE, SODIUM LACTATE, POTASSIUM CHLORIDE, CALCIUM CHLORIDE 600; 310; 30; 20 MG/100ML; MG/100ML; MG/100ML; MG/100ML
INJECTION, SOLUTION INTRAVENOUS CONTINUOUS
Status: DISCONTINUED | OUTPATIENT
Start: 2024-09-24 | End: 2024-09-25

## 2024-09-24 RX ADMIN — PANTOPRAZOLE SODIUM 40 MG: 40 TABLET, DELAYED RELEASE ORAL at 05:18

## 2024-09-24 RX ADMIN — ENOXAPARIN SODIUM 30 MG: 100 INJECTION SUBCUTANEOUS at 07:54

## 2024-09-24 RX ADMIN — ENOXAPARIN SODIUM 30 MG: 100 INJECTION SUBCUTANEOUS at 19:37

## 2024-09-24 RX ADMIN — CEFEPIME 2000 MG: 2 INJECTION, POWDER, FOR SOLUTION INTRAVENOUS at 01:56

## 2024-09-24 RX ADMIN — DOXEPIN HYDROCHLORIDE 10 MG: 10 CAPSULE ORAL at 23:27

## 2024-09-24 RX ADMIN — PIPERACILLIN AND TAZOBACTAM 4500 MG: 4; .5 INJECTION, POWDER, LYOPHILIZED, FOR SOLUTION INTRAVENOUS at 13:58

## 2024-09-24 RX ADMIN — VANCOMYCIN HYDROCHLORIDE 1000 MG: 1 INJECTION, POWDER, LYOPHILIZED, FOR SOLUTION INTRAVENOUS at 10:25

## 2024-09-24 RX ADMIN — DOXEPIN HYDROCHLORIDE 10 MG: 10 CAPSULE ORAL at 00:11

## 2024-09-24 RX ADMIN — CLINDAMYCIN PHOSPHATE 900 MG: 900 INJECTION, SOLUTION INTRAVENOUS at 03:06

## 2024-09-24 RX ADMIN — CLINDAMYCIN PHOSPHATE 900 MG: 900 INJECTION, SOLUTION INTRAVENOUS at 09:02

## 2024-09-24 RX ADMIN — CEFEPIME 2000 MG: 2 INJECTION, POWDER, FOR SOLUTION INTRAVENOUS at 09:04

## 2024-09-24 RX ADMIN — SODIUM CHLORIDE, PRESERVATIVE FREE 10 ML: 5 INJECTION INTRAVENOUS at 07:54

## 2024-09-24 RX ADMIN — DAPTOMYCIN 650 MG: 500 INJECTION, POWDER, LYOPHILIZED, FOR SOLUTION INTRAVENOUS at 14:25

## 2024-09-24 RX ADMIN — VANCOMYCIN HYDROCHLORIDE 1000 MG: 1 INJECTION, POWDER, LYOPHILIZED, FOR SOLUTION INTRAVENOUS at 00:09

## 2024-09-24 RX ADMIN — ATENOLOL 50 MG: 50 TABLET ORAL at 07:54

## 2024-09-24 RX ADMIN — PIPERACILLIN AND TAZOBACTAM 3375 MG: 3; .375 INJECTION, POWDER, LYOPHILIZED, FOR SOLUTION INTRAVENOUS at 19:37

## 2024-09-24 RX ADMIN — SODIUM CHLORIDE, POTASSIUM CHLORIDE, SODIUM LACTATE AND CALCIUM CHLORIDE: 600; 310; 30; 20 INJECTION, SOLUTION INTRAVENOUS at 09:01

## 2024-09-24 NOTE — CARE COORDINATION
Patient is from home alone and has used a knee scooter in the past with a previous surgery. He currently does not have a knee scooter and would need to get one if able to return to home. Paperwork sent to [facility] for the wound vac to be approved and she is going to email paperwork back to be signed by MD if able to return to home.     PT/OT evals in process. ID consulted and awaiting final recs. If home with IV ABX will make referral.     Electronically signed by Thania Rivera RN on 9/24/2024 at 2:24 PM  [phone]

## 2024-09-25 LAB
ALBUMIN SERPL-MCNC: 3 G/DL (ref 3.4–5)
ANION GAP SERPL CALCULATED.3IONS-SCNC: 13 MMOL/L (ref 3–16)
BASOPHILS # BLD: 0 K/UL (ref 0–0.2)
BASOPHILS NFR BLD: 0.6 %
BUN SERPL-MCNC: 13 MG/DL (ref 7–20)
CALCIUM SERPL-MCNC: 8.8 MG/DL (ref 8.3–10.6)
CHLORIDE SERPL-SCNC: 97 MMOL/L (ref 99–110)
CK SERPL-CCNC: 30 U/L (ref 39–308)
CO2 SERPL-SCNC: 25 MMOL/L (ref 21–32)
CREAT SERPL-MCNC: 0.9 MG/DL (ref 0.8–1.3)
DEPRECATED RDW RBC AUTO: 15.1 % (ref 12.4–15.4)
EOSINOPHIL # BLD: 0.2 K/UL (ref 0–0.6)
EOSINOPHIL NFR BLD: 2.9 %
GFR SERPLBLD CREATININE-BSD FMLA CKD-EPI: 89 ML/MIN/{1.73_M2}
GLUCOSE BLD-MCNC: 112 MG/DL (ref 70–99)
GLUCOSE BLD-MCNC: 121 MG/DL (ref 70–99)
GLUCOSE BLD-MCNC: 123 MG/DL (ref 70–99)
GLUCOSE BLD-MCNC: 97 MG/DL (ref 70–99)
GLUCOSE SERPL-MCNC: 104 MG/DL (ref 70–99)
HCT VFR BLD AUTO: 39.2 % (ref 40.5–52.5)
HGB BLD-MCNC: 13 G/DL (ref 13.5–17.5)
LYMPHOCYTES # BLD: 0.5 K/UL (ref 1–5.1)
LYMPHOCYTES NFR BLD: 9.6 %
MAGNESIUM SERPL-MCNC: 1.8 MG/DL (ref 1.8–2.4)
MCH RBC QN AUTO: 30.8 PG (ref 26–34)
MCHC RBC AUTO-ENTMCNC: 33 G/DL (ref 31–36)
MCV RBC AUTO: 93.1 FL (ref 80–100)
MONOCYTES # BLD: 0.6 K/UL (ref 0–1.3)
MONOCYTES NFR BLD: 10.8 %
NEUTROPHILS # BLD: 4.1 K/UL (ref 1.7–7.7)
NEUTROPHILS NFR BLD: 76.1 %
PERFORMED ON: ABNORMAL
PERFORMED ON: NORMAL
PHOSPHATE SERPL-MCNC: 2.8 MG/DL (ref 2.5–4.9)
PLATELET # BLD AUTO: 207 K/UL (ref 135–450)
PMV BLD AUTO: 8.1 FL (ref 5–10.5)
POTASSIUM SERPL-SCNC: 3.8 MMOL/L (ref 3.5–5.1)
RBC # BLD AUTO: 4.21 M/UL (ref 4.2–5.9)
SODIUM SERPL-SCNC: 135 MMOL/L (ref 136–145)
WBC # BLD AUTO: 5.3 K/UL (ref 4–11)

## 2024-09-25 PROCEDURE — 97530 THERAPEUTIC ACTIVITIES: CPT

## 2024-09-25 PROCEDURE — 2580000003 HC RX 258: Performed by: INTERNAL MEDICINE

## 2024-09-25 PROCEDURE — 2500000003 HC RX 250 WO HCPCS: Performed by: INTERNAL MEDICINE

## 2024-09-25 PROCEDURE — 80069 RENAL FUNCTION PANEL: CPT

## 2024-09-25 PROCEDURE — 36415 COLL VENOUS BLD VENIPUNCTURE: CPT

## 2024-09-25 PROCEDURE — 6360000002 HC RX W HCPCS

## 2024-09-25 PROCEDURE — 85025 COMPLETE CBC W/AUTO DIFF WBC: CPT

## 2024-09-25 PROCEDURE — 97116 GAIT TRAINING THERAPY: CPT

## 2024-09-25 PROCEDURE — 82550 ASSAY OF CK (CPK): CPT

## 2024-09-25 PROCEDURE — 6370000000 HC RX 637 (ALT 250 FOR IP)

## 2024-09-25 PROCEDURE — 2580000003 HC RX 258

## 2024-09-25 PROCEDURE — 99233 SBSQ HOSP IP/OBS HIGH 50: CPT | Performed by: INTERNAL MEDICINE

## 2024-09-25 PROCEDURE — 99223 1ST HOSP IP/OBS HIGH 75: CPT | Performed by: SURGERY

## 2024-09-25 PROCEDURE — 83735 ASSAY OF MAGNESIUM: CPT

## 2024-09-25 PROCEDURE — 1200000000 HC SEMI PRIVATE

## 2024-09-25 PROCEDURE — 99232 SBSQ HOSP IP/OBS MODERATE 35: CPT | Performed by: HOSPITALIST

## 2024-09-25 PROCEDURE — 36569 INSJ PICC 5 YR+ W/O IMAGING: CPT

## 2024-09-25 PROCEDURE — 02HV33Z INSERTION OF INFUSION DEVICE INTO SUPERIOR VENA CAVA, PERCUTANEOUS APPROACH: ICD-10-PCS | Performed by: HOSPITALIST

## 2024-09-25 PROCEDURE — 6360000002 HC RX W HCPCS: Performed by: INTERNAL MEDICINE

## 2024-09-25 PROCEDURE — C1751 CATH, INF, PER/CENT/MIDLINE: HCPCS

## 2024-09-25 RX ORDER — SODIUM CHLORIDE 9 MG/ML
INJECTION, SOLUTION INTRAVENOUS CONTINUOUS
Status: DISCONTINUED | OUTPATIENT
Start: 2024-09-25 | End: 2024-09-25

## 2024-09-25 RX ORDER — SODIUM CHLORIDE 0.9 % (FLUSH) 0.9 %
5-40 SYRINGE (ML) INJECTION EVERY 12 HOURS SCHEDULED
Status: DISCONTINUED | OUTPATIENT
Start: 2024-09-25 | End: 2024-10-01 | Stop reason: HOSPADM

## 2024-09-25 RX ORDER — SODIUM CHLORIDE, SODIUM LACTATE, POTASSIUM CHLORIDE, CALCIUM CHLORIDE 600; 310; 30; 20 MG/100ML; MG/100ML; MG/100ML; MG/100ML
INJECTION, SOLUTION INTRAVENOUS CONTINUOUS
Status: DISCONTINUED | OUTPATIENT
Start: 2024-09-26 | End: 2024-09-30

## 2024-09-25 RX ORDER — SODIUM CHLORIDE 0.9 % (FLUSH) 0.9 %
5-40 SYRINGE (ML) INJECTION PRN
Status: DISCONTINUED | OUTPATIENT
Start: 2024-09-25 | End: 2024-10-01 | Stop reason: HOSPADM

## 2024-09-25 RX ORDER — SODIUM CHLORIDE 9 MG/ML
INJECTION, SOLUTION INTRAVENOUS PRN
Status: DISCONTINUED | OUTPATIENT
Start: 2024-09-25 | End: 2024-10-01 | Stop reason: HOSPADM

## 2024-09-25 RX ORDER — LIDOCAINE HYDROCHLORIDE 10 MG/ML
50 INJECTION, SOLUTION EPIDURAL; INFILTRATION; INTRACAUDAL; PERINEURAL ONCE
Status: COMPLETED | OUTPATIENT
Start: 2024-09-25 | End: 2024-09-25

## 2024-09-25 RX ADMIN — ENOXAPARIN SODIUM 30 MG: 100 INJECTION SUBCUTANEOUS at 07:54

## 2024-09-25 RX ADMIN — SODIUM CHLORIDE, PRESERVATIVE FREE 10 ML: 5 INJECTION INTRAVENOUS at 22:17

## 2024-09-25 RX ADMIN — DAPTOMYCIN 650 MG: 500 INJECTION, POWDER, LYOPHILIZED, FOR SOLUTION INTRAVENOUS at 11:30

## 2024-09-25 RX ADMIN — PIPERACILLIN AND TAZOBACTAM 3375 MG: 3; .375 INJECTION, POWDER, LYOPHILIZED, FOR SOLUTION INTRAVENOUS at 18:17

## 2024-09-25 RX ADMIN — ATENOLOL 50 MG: 50 TABLET ORAL at 07:54

## 2024-09-25 RX ADMIN — SODIUM CHLORIDE, POTASSIUM CHLORIDE, SODIUM LACTATE AND CALCIUM CHLORIDE: 600; 310; 30; 20 INJECTION, SOLUTION INTRAVENOUS at 23:57

## 2024-09-25 RX ADMIN — LIDOCAINE HYDROCHLORIDE ANHYDROUS 50 MG: 10 INJECTION, SOLUTION INFILTRATION at 14:48

## 2024-09-25 RX ADMIN — PIPERACILLIN AND TAZOBACTAM 3375 MG: 3; .375 INJECTION, POWDER, LYOPHILIZED, FOR SOLUTION INTRAVENOUS at 03:46

## 2024-09-25 RX ADMIN — ENOXAPARIN SODIUM 30 MG: 100 INJECTION SUBCUTANEOUS at 22:16

## 2024-09-25 RX ADMIN — DOXEPIN HYDROCHLORIDE 10 MG: 10 CAPSULE ORAL at 23:09

## 2024-09-25 RX ADMIN — PIPERACILLIN AND TAZOBACTAM 3375 MG: 3; .375 INJECTION, POWDER, LYOPHILIZED, FOR SOLUTION INTRAVENOUS at 12:18

## 2024-09-25 NOTE — PROCEDURES
PROCEDURE NOTE  Date: 2024   Name: Dominick Aguayo  YOB: 1950    Procedures                                                                   SINGLE PICC PROCEDURE NOTE  Chart reviewed for allergies, diagnosis, labs, known contraindications, reason for line placement and planned length of treatment.  Informed consent noted to be signed and on chart.  Insertion procedure discussed with patient/family member.  Three patient identifiers - Patient name,   and MRN -  completed &  confirmed verbally.         Time out performed Hat, mask and eye shield donned.  PICC site cleaned with chlorhexidine wipes then scrubbed with Chloraprep  One-Step applicator for 30 seconds x 1.   Hand Hygiene  performed with 3% Chlorhexidine surgical scrub x1 min prior to  sterile gloves, sterile gown being donned.  Patient draped using maximal sterile barrier technique ( head to toe ).  PICC site scrubbed a 2nd time with Chloraprep One-Step applicator x 30 sec. Modified Seldinger technique/ultrasound assisted and tip locating system utilized for insertion and 1% Lidocaine 5 ml injected intradermal pre-insertion.  PICC tip location in the SVC confirmed by ECG technology.   Positive brisk blood return obtained from lumen.  Valve applied to lumen and flushed with 10 mls  0.9% Sterile Sodium Chloride.  All lumens flush easily with no resistance.  Skin prep applied to site.  Catheter secured with non-sutured locking device per hospital protocol. Bio-patch/CHG impregnated sterile tegaderm dressing applied.  Alcohol Swab Cap applied to valve.  Sterile field maintained during procedure.  PICC insertion, rhythm and positioning wire (utilized prn) accounted for post procedure and disposed of in sharps.  Appearance of site is Clean dry and intact without bleeding or edema. All edges of Tegaderm occlusive.   Site marked with date and initials of RN placing line. Teaching performed to pt/family and noted in education section.   Bed

## 2024-09-25 NOTE — PERIOP NOTE
PRE-OP NOTE  Department of Surgery  Resident Note     Procedure: LLE angiogram with intention to treat    Consent: Informed consent to be signed    Orders:   Diet: NPO after midnight,  IVF @ [facility] at 75cc/hr at MN  Pre op Medications:  Ancef OCTOR, currently on daptomycin and zosyn  Labs to be drawn: Type and Screen, Renal panel, CBC, INR  EKG revied from 9/21 no acute changes  CXR 9/22 with no acute cardiopulmonary abnormality  Anesthesia to see patient  Okay to give lovenox day of surgery    Louie Oquendo DO  PGY-1, General Surgery Resident  09/25/24 12:43 PM  [place]  [phone]

## 2024-09-25 NOTE — CARE COORDINATION
Patient is from home alone and although therapy recs are for IPR he prefers to return to home as he has done this before. He had [facility] in the past but they are not available in [place]. I made a referral for home care with Ashlyn with [facility]. Will also make referral with [facility], as he has had them in the past.     He also needs a knee scooter which is typically not approved by insurance. Will check into this to see if he needs to buy one, rent one or we can get insurance approval.     Vascular consulted. Will get [facility] paperwork signed by podiatry for wound vac approval.     Electronically signed by Thania Rivera RN on 9/25/2024 at 10:25 AM  [phone]

## 2024-09-26 LAB
ABO + RH BLD: NORMAL
ALBUMIN SERPL-MCNC: 2.9 G/DL (ref 3.4–5)
ALBUMIN SERPL-MCNC: 3 G/DL (ref 3.4–5)
ALP SERPL-CCNC: 119 U/L (ref 40–129)
ALT SERPL-CCNC: 78 U/L (ref 10–40)
ANION GAP SERPL CALCULATED.3IONS-SCNC: 13 MMOL/L (ref 3–16)
AST SERPL-CCNC: 49 U/L (ref 15–37)
BACTERIA SPEC AEROBE CULT: ABNORMAL
BACTERIA SPEC AEROBE CULT: ABNORMAL
BACTERIA SPEC ANAEROBE CULT: ABNORMAL
BACTERIA SPEC ANAEROBE CULT: ABNORMAL
BASOPHILS # BLD: 0.1 K/UL (ref 0–0.2)
BASOPHILS NFR BLD: 1.1 %
BILIRUB DIRECT SERPL-MCNC: 0.2 MG/DL (ref 0–0.3)
BILIRUB INDIRECT SERPL-MCNC: 0.2 MG/DL (ref 0–1)
BILIRUB SERPL-MCNC: 0.4 MG/DL (ref 0–1)
BLD GP AB SCN SERPL QL: NORMAL
BUN SERPL-MCNC: 16 MG/DL (ref 7–20)
CALCIUM SERPL-MCNC: 9.2 MG/DL (ref 8.3–10.6)
CHLORIDE SERPL-SCNC: 100 MMOL/L (ref 99–110)
CO2 SERPL-SCNC: 24 MMOL/L (ref 21–32)
CREAT SERPL-MCNC: 0.9 MG/DL (ref 0.8–1.3)
DEPRECATED RDW RBC AUTO: 15.1 % (ref 12.4–15.4)
EOSINOPHIL # BLD: 0.1 K/UL (ref 0–0.6)
EOSINOPHIL NFR BLD: 2 %
GFR SERPLBLD CREATININE-BSD FMLA CKD-EPI: 89 ML/MIN/{1.73_M2}
GLUCOSE BLD-MCNC: 102 MG/DL (ref 70–99)
GLUCOSE BLD-MCNC: 103 MG/DL (ref 70–99)
GLUCOSE BLD-MCNC: 116 MG/DL (ref 70–99)
GLUCOSE BLD-MCNC: 122 MG/DL (ref 70–99)
GLUCOSE SERPL-MCNC: 104 MG/DL (ref 70–99)
GRAM STN SPEC: ABNORMAL
HCT VFR BLD AUTO: 42 % (ref 40.5–52.5)
HGB BLD-MCNC: 13.6 G/DL (ref 13.5–17.5)
INR PPP: 1.24 (ref 0.85–1.15)
LYMPHOCYTES # BLD: 0.9 K/UL (ref 1–5.1)
LYMPHOCYTES NFR BLD: 12.5 %
MAGNESIUM SERPL-MCNC: 1.8 MG/DL (ref 1.8–2.4)
MCH RBC QN AUTO: 30.4 PG (ref 26–34)
MCHC RBC AUTO-ENTMCNC: 32.4 G/DL (ref 31–36)
MCV RBC AUTO: 93.7 FL (ref 80–100)
MONOCYTES # BLD: 0.8 K/UL (ref 0–1.3)
MONOCYTES NFR BLD: 11 %
NEUTROPHILS # BLD: 5 K/UL (ref 1.7–7.7)
NEUTROPHILS NFR BLD: 73.4 %
ORGANISM: ABNORMAL
PERFORMED ON: ABNORMAL
PHOSPHATE SERPL-MCNC: 3.1 MG/DL (ref 2.5–4.9)
PLATELET # BLD AUTO: 231 K/UL (ref 135–450)
PMV BLD AUTO: 7.9 FL (ref 5–10.5)
POTASSIUM SERPL-SCNC: 4 MMOL/L (ref 3.5–5.1)
PROT SERPL-MCNC: 6.7 G/DL (ref 6.4–8.2)
PROTHROMBIN TIME: 15.8 SEC (ref 11.9–14.9)
RBC # BLD AUTO: 4.48 M/UL (ref 4.2–5.9)
RBC MORPH BLD: NORMAL
SLIDE REVIEW: ABNORMAL
SODIUM SERPL-SCNC: 137 MMOL/L (ref 136–145)
WBC # BLD AUTO: 6.9 K/UL (ref 4–11)

## 2024-09-26 PROCEDURE — 75710 ARTERY X-RAYS ARM/LEG: CPT | Performed by: SURGERY

## 2024-09-26 PROCEDURE — 86900 BLOOD TYPING SEROLOGIC ABO: CPT

## 2024-09-26 PROCEDURE — C1760 CLOSURE DEV, VASC: HCPCS | Performed by: SURGERY

## 2024-09-26 PROCEDURE — 6360000002 HC RX W HCPCS: Performed by: INTERNAL MEDICINE

## 2024-09-26 PROCEDURE — 047S3Z1 DILATION OF LEFT POSTERIOR TIBIAL ARTERY USING DRUG-COATED BALLOON, PERCUTANEOUS APPROACH: ICD-10-PCS | Performed by: SURGERY

## 2024-09-26 PROCEDURE — 75774 ARTERY X-RAY EACH VESSEL: CPT | Performed by: SURGERY

## 2024-09-26 PROCEDURE — 83735 ASSAY OF MAGNESIUM: CPT

## 2024-09-26 PROCEDURE — 37232 PR REVSC OPN/PRQ TIB/PERO W/ANGIOPLASTY UNI EA VSL: CPT | Performed by: SURGERY

## 2024-09-26 PROCEDURE — 99232 SBSQ HOSP IP/OBS MODERATE 35: CPT | Performed by: HOSPITALIST

## 2024-09-26 PROCEDURE — 86850 RBC ANTIBODY SCREEN: CPT

## 2024-09-26 PROCEDURE — B41G1ZZ FLUOROSCOPY OF LEFT LOWER EXTREMITY ARTERIES USING LOW OSMOLAR CONTRAST: ICD-10-PCS | Performed by: SURGERY

## 2024-09-26 PROCEDURE — 80069 RENAL FUNCTION PANEL: CPT

## 2024-09-26 PROCEDURE — C1894 INTRO/SHEATH, NON-LASER: HCPCS | Performed by: SURGERY

## 2024-09-26 PROCEDURE — 37228 HC TIB PER TERRITORY PLASTY: CPT | Performed by: SURGERY

## 2024-09-26 PROCEDURE — 1200000000 HC SEMI PRIVATE

## 2024-09-26 PROCEDURE — 2709999900 HC NON-CHARGEABLE SUPPLY: Performed by: SURGERY

## 2024-09-26 PROCEDURE — 37228 PR REVSC OPN/PRQ TIB/PERO W/ANGIOPLASTY UNI: CPT | Performed by: SURGERY

## 2024-09-26 PROCEDURE — C1769 GUIDE WIRE: HCPCS | Performed by: SURGERY

## 2024-09-26 PROCEDURE — 2580000003 HC RX 258

## 2024-09-26 PROCEDURE — 99152 MOD SED SAME PHYS/QHP 5/>YRS: CPT | Performed by: SURGERY

## 2024-09-26 PROCEDURE — 6360000002 HC RX W HCPCS

## 2024-09-26 PROCEDURE — 047Q3ZZ DILATION OF LEFT ANTERIOR TIBIAL ARTERY, PERCUTANEOUS APPROACH: ICD-10-PCS | Performed by: SURGERY

## 2024-09-26 PROCEDURE — 99232 SBSQ HOSP IP/OBS MODERATE 35: CPT | Performed by: INTERNAL MEDICINE

## 2024-09-26 PROCEDURE — C1887 CATHETER, GUIDING: HCPCS | Performed by: SURGERY

## 2024-09-26 PROCEDURE — 2580000003 HC RX 258: Performed by: INTERNAL MEDICINE

## 2024-09-26 PROCEDURE — 86901 BLOOD TYPING SEROLOGIC RH(D): CPT

## 2024-09-26 PROCEDURE — 85025 COMPLETE CBC W/AUTO DIFF WBC: CPT

## 2024-09-26 PROCEDURE — 37232 HC TIB PER TERR ADDL PLASTY: CPT | Performed by: SURGERY

## 2024-09-26 PROCEDURE — 75625 CONTRAST EXAM ABDOMINL AORTA: CPT | Performed by: SURGERY

## 2024-09-26 PROCEDURE — 85610 PROTHROMBIN TIME: CPT

## 2024-09-26 PROCEDURE — 99153 MOD SED SAME PHYS/QHP EA: CPT | Performed by: SURGERY

## 2024-09-26 PROCEDURE — 36415 COLL VENOUS BLD VENIPUNCTURE: CPT

## 2024-09-26 PROCEDURE — 6360000002 HC RX W HCPCS: Performed by: SURGERY

## 2024-09-26 PROCEDURE — 6360000004 HC RX CONTRAST MEDICATION: Performed by: SURGERY

## 2024-09-26 PROCEDURE — 2500000003 HC RX 250 WO HCPCS: Performed by: SURGERY

## 2024-09-26 PROCEDURE — C1725 CATH, TRANSLUMIN NON-LASER: HCPCS | Performed by: SURGERY

## 2024-09-26 PROCEDURE — C2623 CATH, TRANSLUMIN, DRUG-COAT: HCPCS | Performed by: SURGERY

## 2024-09-26 PROCEDURE — 6370000000 HC RX 637 (ALT 250 FOR IP)

## 2024-09-26 PROCEDURE — B4101ZZ FLUOROSCOPY OF ABDOMINAL AORTA USING LOW OSMOLAR CONTRAST: ICD-10-PCS | Performed by: SURGERY

## 2024-09-26 PROCEDURE — 80076 HEPATIC FUNCTION PANEL: CPT

## 2024-09-26 RX ORDER — IODIXANOL 270 MG/ML
INJECTION, SOLUTION INTRAVASCULAR PRN
Status: DISCONTINUED | OUTPATIENT
Start: 2024-09-26 | End: 2024-09-26 | Stop reason: HOSPADM

## 2024-09-26 RX ORDER — HEPARIN SODIUM 1000 [USP'U]/ML
INJECTION, SOLUTION INTRAVENOUS; SUBCUTANEOUS PRN
Status: DISCONTINUED | OUTPATIENT
Start: 2024-09-26 | End: 2024-09-26 | Stop reason: HOSPADM

## 2024-09-26 RX ORDER — ASPIRIN 325 MG
325 TABLET ORAL DAILY
Status: DISCONTINUED | OUTPATIENT
Start: 2024-09-27 | End: 2024-10-01 | Stop reason: HOSPADM

## 2024-09-26 RX ORDER — ATORVASTATIN CALCIUM 40 MG/1
40 TABLET, FILM COATED ORAL NIGHTLY
Status: DISCONTINUED | OUTPATIENT
Start: 2024-09-26 | End: 2024-09-28

## 2024-09-26 RX ORDER — MIDAZOLAM HYDROCHLORIDE 1 MG/ML
INJECTION INTRAMUSCULAR; INTRAVENOUS PRN
Status: DISCONTINUED | OUTPATIENT
Start: 2024-09-26 | End: 2024-09-26 | Stop reason: HOSPADM

## 2024-09-26 RX ADMIN — ATENOLOL 50 MG: 50 TABLET ORAL at 09:40

## 2024-09-26 RX ADMIN — PANTOPRAZOLE SODIUM 40 MG: 40 TABLET, DELAYED RELEASE ORAL at 06:10

## 2024-09-26 RX ADMIN — PIPERACILLIN AND TAZOBACTAM 3375 MG: 3; .375 INJECTION, POWDER, LYOPHILIZED, FOR SOLUTION INTRAVENOUS at 18:39

## 2024-09-26 RX ADMIN — ENOXAPARIN SODIUM 30 MG: 100 INJECTION SUBCUTANEOUS at 09:41

## 2024-09-26 RX ADMIN — SODIUM CHLORIDE, POTASSIUM CHLORIDE, SODIUM LACTATE AND CALCIUM CHLORIDE: 600; 310; 30; 20 INJECTION, SOLUTION INTRAVENOUS at 17:59

## 2024-09-26 RX ADMIN — SODIUM CHLORIDE, PRESERVATIVE FREE 10 ML: 5 INJECTION INTRAVENOUS at 10:13

## 2024-09-26 RX ADMIN — SODIUM CHLORIDE, PRESERVATIVE FREE 10 ML: 5 INJECTION INTRAVENOUS at 20:14

## 2024-09-26 RX ADMIN — DAPTOMYCIN 650 MG: 500 INJECTION, POWDER, LYOPHILIZED, FOR SOLUTION INTRAVENOUS at 13:07

## 2024-09-26 RX ADMIN — ACETAMINOPHEN 650 MG: 325 TABLET ORAL at 10:08

## 2024-09-26 RX ADMIN — DOXEPIN HYDROCHLORIDE 10 MG: 10 CAPSULE ORAL at 20:44

## 2024-09-26 RX ADMIN — PIPERACILLIN AND TAZOBACTAM 3375 MG: 3; .375 INJECTION, POWDER, LYOPHILIZED, FOR SOLUTION INTRAVENOUS at 11:52

## 2024-09-26 RX ADMIN — PIPERACILLIN AND TAZOBACTAM 3375 MG: 3; .375 INJECTION, POWDER, LYOPHILIZED, FOR SOLUTION INTRAVENOUS at 03:37

## 2024-09-26 RX ADMIN — ENOXAPARIN SODIUM 30 MG: 100 INJECTION SUBCUTANEOUS at 20:43

## 2024-09-26 ASSESSMENT — PAIN DESCRIPTION - ORIENTATION: ORIENTATION: LOWER

## 2024-09-26 ASSESSMENT — PAIN DESCRIPTION - ONSET: ONSET: ON-GOING

## 2024-09-26 ASSESSMENT — PAIN - FUNCTIONAL ASSESSMENT: PAIN_FUNCTIONAL_ASSESSMENT: ACTIVITIES ARE NOT PREVENTED

## 2024-09-26 ASSESSMENT — PAIN DESCRIPTION - FREQUENCY: FREQUENCY: INTERMITTENT

## 2024-09-26 ASSESSMENT — PAIN DESCRIPTION - PAIN TYPE: TYPE: ACUTE PAIN

## 2024-09-26 ASSESSMENT — PAIN DESCRIPTION - LOCATION: LOCATION: BACK

## 2024-09-26 ASSESSMENT — PAIN DESCRIPTION - DESCRIPTORS: DESCRIPTORS: TIGHTNESS;ACHING

## 2024-09-26 ASSESSMENT — PAIN SCALES - GENERAL
PAINLEVEL_OUTOF10: 2
PAINLEVEL_OUTOF10: 3

## 2024-09-26 NOTE — BRIEF OP NOTE
Brief Postoperative Note      Patient: Dominick Aguayo  YOB: 1950  MRN: [id number]    Date of Procedure: 9/26/2024    Pre-Op Diagnosis Codes:      * Osteomyelitis, unspecified site, unspecified type (HCC) [M86.9]    Post-Op Diagnosis: Same       Procedure(s):  Angiography lower ext left  Ultrasound-guided access right common femoral artery  Abdominal aortogram and left lower extremity arteriogram  Left anterior tibial artery angioplasty 3 mm x 60 mm [place] balloon  Left posterior tibial artery angioplasty 4 mm x 40 mm [place] drug-eluting balloon    Surgeon(s):  Preethi Ibrahim MD    Assistant:  * No surgical staff found *    Anesthesia: * No anesthesia type entered *    Estimated Blood Loss (mL): Minimal    Complications: None    Specimens:   * No specimens in log *    Implants:  * No implants in log *      Drains:   Negative Pressure Wound Therapy Foot Left;Anterior (Active)   Unit Type VAC 09/26/24 0545   Canister changed? No 09/26/24 0545   Drainage Amount None 09/26/24 0545   Drainage Description Sanguinous 09/26/24 0545   Output (ml) 0 ml 09/26/24 0545       [REMOVED] Negative Pressure Wound Therapy Foot Left (Removed)       Findings:  Infection Present At Time Of Surgery (PATOS) (choose all levels that have infection present):  - Organ Space infection (below fascia) present as evidenced by osteomyelitis  Other Findings: Three-vessel runoff to the foot, dominantly by the posterior tibial artery.  Proximal posterior tibial artery stenosis with good result with angioplasty.  Anterior tibial artery with 2 areas of stenosis in the proximal and distal segments, respectively, both responding well to angioplasty.    Electronically signed by Perethi Ibrahim MD on 9/26/2024 at 8:35 AM

## 2024-09-27 PROBLEM — I73.9 PAD (PERIPHERAL ARTERY DISEASE) (HCC): Status: ACTIVE | Noted: 2024-09-27

## 2024-09-27 LAB
ALBUMIN SERPL-MCNC: 3 G/DL (ref 3.4–5)
ANION GAP SERPL CALCULATED.3IONS-SCNC: 10 MMOL/L (ref 3–16)
BASOPHILS # BLD: 0.1 K/UL (ref 0–0.2)
BASOPHILS NFR BLD: 0.8 %
BUN SERPL-MCNC: 15 MG/DL (ref 7–20)
CALCIUM SERPL-MCNC: 9 MG/DL (ref 8.3–10.6)
CHLORIDE SERPL-SCNC: 100 MMOL/L (ref 99–110)
CO2 SERPL-SCNC: 28 MMOL/L (ref 21–32)
CREAT SERPL-MCNC: 0.9 MG/DL (ref 0.8–1.3)
DEPRECATED RDW RBC AUTO: 14.9 % (ref 12.4–15.4)
EOSINOPHIL # BLD: 0.1 K/UL (ref 0–0.6)
EOSINOPHIL NFR BLD: 1.9 %
GFR SERPLBLD CREATININE-BSD FMLA CKD-EPI: 89 ML/MIN/{1.73_M2}
GLUCOSE BLD-MCNC: 102 MG/DL (ref 70–99)
GLUCOSE BLD-MCNC: 103 MG/DL (ref 70–99)
GLUCOSE BLD-MCNC: 93 MG/DL (ref 70–99)
GLUCOSE BLD-MCNC: 94 MG/DL (ref 70–99)
GLUCOSE SERPL-MCNC: 97 MG/DL (ref 70–99)
HCT VFR BLD AUTO: 38.4 % (ref 40.5–52.5)
HGB BLD-MCNC: 12.8 G/DL (ref 13.5–17.5)
LYMPHOCYTES # BLD: 0.8 K/UL (ref 1–5.1)
LYMPHOCYTES NFR BLD: 12.6 %
MAGNESIUM SERPL-MCNC: 1.9 MG/DL (ref 1.8–2.4)
MCH RBC QN AUTO: 30.7 PG (ref 26–34)
MCHC RBC AUTO-ENTMCNC: 33.4 G/DL (ref 31–36)
MCV RBC AUTO: 91.9 FL (ref 80–100)
MONOCYTES # BLD: 0.7 K/UL (ref 0–1.3)
MONOCYTES NFR BLD: 10.8 %
NEUTROPHILS # BLD: 4.8 K/UL (ref 1.7–7.7)
NEUTROPHILS NFR BLD: 73.9 %
PERFORMED ON: ABNORMAL
PERFORMED ON: ABNORMAL
PERFORMED ON: NORMAL
PERFORMED ON: NORMAL
PHOSPHATE SERPL-MCNC: 2.9 MG/DL (ref 2.5–4.9)
PLATELET # BLD AUTO: 234 K/UL (ref 135–450)
PMV BLD AUTO: 7.9 FL (ref 5–10.5)
POTASSIUM SERPL-SCNC: 4.1 MMOL/L (ref 3.5–5.1)
RBC # BLD AUTO: 4.18 M/UL (ref 4.2–5.9)
SODIUM SERPL-SCNC: 138 MMOL/L (ref 136–145)
WBC # BLD AUTO: 6.5 K/UL (ref 4–11)

## 2024-09-27 PROCEDURE — 2580000003 HC RX 258: Performed by: INTERNAL MEDICINE

## 2024-09-27 PROCEDURE — 97535 SELF CARE MNGMENT TRAINING: CPT

## 2024-09-27 PROCEDURE — 6370000000 HC RX 637 (ALT 250 FOR IP)

## 2024-09-27 PROCEDURE — 2580000003 HC RX 258

## 2024-09-27 PROCEDURE — 99233 SBSQ HOSP IP/OBS HIGH 50: CPT | Performed by: INTERNAL MEDICINE

## 2024-09-27 PROCEDURE — 6360000002 HC RX W HCPCS

## 2024-09-27 PROCEDURE — 6360000002 HC RX W HCPCS: Performed by: INTERNAL MEDICINE

## 2024-09-27 PROCEDURE — 83735 ASSAY OF MAGNESIUM: CPT

## 2024-09-27 PROCEDURE — 99232 SBSQ HOSP IP/OBS MODERATE 35: CPT | Performed by: HOSPITALIST

## 2024-09-27 PROCEDURE — 97530 THERAPEUTIC ACTIVITIES: CPT

## 2024-09-27 PROCEDURE — 80069 RENAL FUNCTION PANEL: CPT

## 2024-09-27 PROCEDURE — 85025 COMPLETE CBC W/AUTO DIFF WBC: CPT

## 2024-09-27 PROCEDURE — 1200000000 HC SEMI PRIVATE

## 2024-09-27 RX ORDER — ATORVASTATIN CALCIUM 40 MG/1
40 TABLET, FILM COATED ORAL NIGHTLY
Qty: 30 TABLET | Refills: 3 | Status: SHIPPED | OUTPATIENT
Start: 2024-09-27 | End: 2024-10-01 | Stop reason: HOSPADM

## 2024-09-27 RX ORDER — ASPIRIN 325 MG
325 TABLET ORAL DAILY
Qty: 30 TABLET | Refills: 3 | Status: SHIPPED | OUTPATIENT
Start: 2024-09-27

## 2024-09-27 RX ADMIN — AMPICILLIN AND SULBACTAM 3000 MG: 2; 1 INJECTION, POWDER, FOR SOLUTION INTRAVENOUS at 16:30

## 2024-09-27 RX ADMIN — ATENOLOL 50 MG: 50 TABLET ORAL at 08:52

## 2024-09-27 RX ADMIN — PIPERACILLIN AND TAZOBACTAM 3375 MG: 3; .375 INJECTION, POWDER, LYOPHILIZED, FOR SOLUTION INTRAVENOUS at 02:25

## 2024-09-27 RX ADMIN — ATORVASTATIN CALCIUM 40 MG: 40 TABLET, FILM COATED ORAL at 20:51

## 2024-09-27 RX ADMIN — SODIUM CHLORIDE, POTASSIUM CHLORIDE, SODIUM LACTATE AND CALCIUM CHLORIDE: 600; 310; 30; 20 INJECTION, SOLUTION INTRAVENOUS at 06:22

## 2024-09-27 RX ADMIN — SODIUM CHLORIDE, PRESERVATIVE FREE 10 ML: 5 INJECTION INTRAVENOUS at 08:53

## 2024-09-27 RX ADMIN — PANTOPRAZOLE SODIUM 40 MG: 40 TABLET, DELAYED RELEASE ORAL at 06:12

## 2024-09-27 RX ADMIN — AMPICILLIN AND SULBACTAM 3000 MG: 2; 1 INJECTION, POWDER, FOR SOLUTION INTRAVENOUS at 10:12

## 2024-09-27 RX ADMIN — ASPIRIN 325 MG: 325 TABLET ORAL at 08:52

## 2024-09-27 RX ADMIN — SODIUM CHLORIDE: 9 INJECTION, SOLUTION INTRAVENOUS at 20:56

## 2024-09-27 RX ADMIN — AMPICILLIN AND SULBACTAM 3000 MG: 2; 1 INJECTION, POWDER, FOR SOLUTION INTRAVENOUS at 20:57

## 2024-09-27 RX ADMIN — ACETAMINOPHEN 650 MG: 325 TABLET ORAL at 03:56

## 2024-09-27 RX ADMIN — SODIUM CHLORIDE, PRESERVATIVE FREE 10 ML: 5 INJECTION INTRAVENOUS at 20:58

## 2024-09-27 RX ADMIN — ENOXAPARIN SODIUM 30 MG: 100 INJECTION SUBCUTANEOUS at 08:52

## 2024-09-27 RX ADMIN — ENOXAPARIN SODIUM 30 MG: 100 INJECTION SUBCUTANEOUS at 20:51

## 2024-09-27 RX ADMIN — SODIUM CHLORIDE, POTASSIUM CHLORIDE, SODIUM LACTATE AND CALCIUM CHLORIDE: 600; 310; 30; 20 INJECTION, SOLUTION INTRAVENOUS at 21:00

## 2024-09-27 RX ADMIN — DOXEPIN HYDROCHLORIDE 10 MG: 10 CAPSULE ORAL at 20:51

## 2024-09-27 ASSESSMENT — PAIN SCALES - GENERAL: PAINLEVEL_OUTOF10: 0

## 2024-09-27 NOTE — DISCHARGE INSTR - COC
ORDERS:    Zosyn 3.375 q 8 hr extended infusion through 11/14/24  - Diagnosis - L foot   - First dose given in hospital  - Routine CVC care   - Labs: CBC w diff, CMP, ESR, CRP every Mon or Tue, FAX results to [phone]  - Call with antibiotic / infusion issues, [phone]  - Call with any change in status, transfer in or out of a facility or to hospital - [phone]  - Orders only valid for current disposition, NOT transferable upon discharge from facility or new admission to another facility.  - No f/u in outpatient ID office necessary    George Green MD         Physician Certification: I certify the above information and transfer of Dominick Aguayo  is necessary for the continuing treatment of the diagnosis listed and that he requires Skilled Nursing Facility for 30 days.     Update Admission H&P: No change in H&P    PHYSICIAN SIGNATURE:  Electronically signed by Cal Zheng DO on 9/28/24 at 11:04 AM EDT

## 2024-09-27 NOTE — CARE COORDINATION
DISCHARGE PLANNING:    Chart review.    Patient from home alone.    Patient admitted with gangrene of left foot.  ID and podiatry following.  PICC line in place.  Needs long term IV ABX.  Patient initially was going to go home with [facility] for home infusions but is unable to afford medications.  ID made aware of patient inabiity to pay for medication and IV ABX were changed but still would cost  $64/day and patient is still unable to afford cost.  Patient will need SNF.      CM sent out referral per patient preference to [facility] and [facility].      Electronically signed by DELMY Haley, RN Case Manager on 9/27/2024 at 5:13 PM

## 2024-09-27 NOTE — DISCHARGE SUMMARY
daily     atorvastatin 40 MG tablet  Commonly known as: LIPITOR  Take 1 tablet by mouth nightly     cetirizine 10 MG tablet  Commonly known as: ZYRTEC  Take 1 tablet by mouth daily for 10 doses     predniSONE 20 MG tablet  Commonly known as: DELTASONE  Take 1 tablet by mouth daily for 1 dose            CONTINUE taking these medications      atenolol-chlorthalidone 50-25 MG per tablet  Commonly known as: Tenoretic 50  Take 1 tablet by mouth daily     omeprazole 40 MG delayed release capsule  Commonly known as: PRILOSEC  TAKE 1 CAPSULE EVERY MORNING BEFORE BREAKFAST     silver sulfADIAZINE 1 % cream  Commonly known as: SILVADENE            STOP taking these medications      sulfamethoxazole-trimethoprim 800-160 MG per tablet  Commonly known as: BACTRIM DS;SEPTRA DS               Where to Get Your Medications        These medications were sent to [id number] - [place], [place] - [address] - P [phone] - F [phone] [address] [facility] [address]      Phone: [phone]   atorvastatin 40 MG tablet  cetirizine 10 MG tablet  predniSONE 20 MG tablet       These medications were sent to [id number] - [facility] [address] - P [phone] - F [phone] [address] [address]      Phone: [phone]   aspirin 325 MG tablet             Activity: activity as tolerated and  directed by podiatry  Diet: regular diet  Wound Care: as directed    Time Spent on discharge is more than 30 minutes    Signed:  Festus Jarvis  MS3, [facility]  10/1/2024       Addendum:  Patient has been examined and evaluated.  Agree with the findings mentioned in discharge summary.    Mitchell Prado MD  PGY-2, Internal Medicine  10/01/24  3:09 PM     Addendum to Resident H& P/Progress note:  I have personally seen,examined and evaluated the patient. I have reviewed the current history, physical findings, labs and

## 2024-09-28 LAB
ALBUMIN SERPL-MCNC: 3.1 G/DL (ref 3.4–5)
ANION GAP SERPL CALCULATED.3IONS-SCNC: 8 MMOL/L (ref 3–16)
BASOPHILS # BLD: 0 K/UL (ref 0–0.2)
BASOPHILS NFR BLD: 0.7 %
BUN SERPL-MCNC: 15 MG/DL (ref 7–20)
CALCIUM SERPL-MCNC: 8.4 MG/DL (ref 8.3–10.6)
CHLORIDE SERPL-SCNC: 103 MMOL/L (ref 99–110)
CO2 SERPL-SCNC: 29 MMOL/L (ref 21–32)
CREAT SERPL-MCNC: 0.9 MG/DL (ref 0.8–1.3)
DEPRECATED RDW RBC AUTO: 14.8 % (ref 12.4–15.4)
EOSINOPHIL # BLD: 0.1 K/UL (ref 0–0.6)
EOSINOPHIL NFR BLD: 1.8 %
GFR SERPLBLD CREATININE-BSD FMLA CKD-EPI: 89 ML/MIN/{1.73_M2}
GLUCOSE BLD-MCNC: 87 MG/DL (ref 70–99)
GLUCOSE SERPL-MCNC: 92 MG/DL (ref 70–99)
HCT VFR BLD AUTO: 36.8 % (ref 40.5–52.5)
HGB BLD-MCNC: 12.3 G/DL (ref 13.5–17.5)
LYMPHOCYTES # BLD: 1.1 K/UL (ref 1–5.1)
LYMPHOCYTES NFR BLD: 17.8 %
MAGNESIUM SERPL-MCNC: 1.8 MG/DL (ref 1.8–2.4)
MCH RBC QN AUTO: 30.7 PG (ref 26–34)
MCHC RBC AUTO-ENTMCNC: 33.5 G/DL (ref 31–36)
MCV RBC AUTO: 91.4 FL (ref 80–100)
MONOCYTES # BLD: 0.6 K/UL (ref 0–1.3)
MONOCYTES NFR BLD: 8.9 %
NEUTROPHILS # BLD: 4.6 K/UL (ref 1.7–7.7)
NEUTROPHILS NFR BLD: 70.8 %
PERFORMED ON: NORMAL
PHOSPHATE SERPL-MCNC: 3.2 MG/DL (ref 2.5–4.9)
PLATELET # BLD AUTO: 246 K/UL (ref 135–450)
PMV BLD AUTO: 7.6 FL (ref 5–10.5)
POTASSIUM SERPL-SCNC: 3.9 MMOL/L (ref 3.5–5.1)
RBC # BLD AUTO: 4.02 M/UL (ref 4.2–5.9)
SODIUM SERPL-SCNC: 140 MMOL/L (ref 136–145)
WBC # BLD AUTO: 6.5 K/UL (ref 4–11)

## 2024-09-28 PROCEDURE — 6370000000 HC RX 637 (ALT 250 FOR IP)

## 2024-09-28 PROCEDURE — 99233 SBSQ HOSP IP/OBS HIGH 50: CPT | Performed by: HOSPITALIST

## 2024-09-28 PROCEDURE — 6360000002 HC RX W HCPCS: Performed by: INTERNAL MEDICINE

## 2024-09-28 PROCEDURE — 6360000002 HC RX W HCPCS

## 2024-09-28 PROCEDURE — 80069 RENAL FUNCTION PANEL: CPT

## 2024-09-28 PROCEDURE — 2580000003 HC RX 258

## 2024-09-28 PROCEDURE — 2580000003 HC RX 258: Performed by: INTERNAL MEDICINE

## 2024-09-28 PROCEDURE — 85025 COMPLETE CBC W/AUTO DIFF WBC: CPT

## 2024-09-28 PROCEDURE — 6370000000 HC RX 637 (ALT 250 FOR IP): Performed by: INTERNAL MEDICINE

## 2024-09-28 PROCEDURE — 83735 ASSAY OF MAGNESIUM: CPT

## 2024-09-28 PROCEDURE — 1200000000 HC SEMI PRIVATE

## 2024-09-28 RX ORDER — PREDNISONE 20 MG/1
20 TABLET ORAL DAILY
Status: DISCONTINUED | OUTPATIENT
Start: 2024-09-28 | End: 2024-10-01 | Stop reason: HOSPADM

## 2024-09-28 RX ORDER — CETIRIZINE HYDROCHLORIDE 10 MG/1
10 TABLET ORAL DAILY
Status: DISCONTINUED | OUTPATIENT
Start: 2024-09-28 | End: 2024-10-01 | Stop reason: HOSPADM

## 2024-09-28 RX ORDER — METRONIDAZOLE 500 MG/1
500 TABLET ORAL EVERY 8 HOURS SCHEDULED
Status: DISCONTINUED | OUTPATIENT
Start: 2024-09-28 | End: 2024-09-30

## 2024-09-28 RX ORDER — CETIRIZINE HYDROCHLORIDE 10 MG/1
10 TABLET ORAL DAILY
Qty: 14 TABLET | Refills: 0 | Status: SHIPPED | OUTPATIENT
Start: 2024-09-28 | End: 2024-10-01

## 2024-09-28 RX ORDER — PREDNISONE 20 MG/1
20 TABLET ORAL DAILY
Qty: 5 TABLET | Refills: 0 | Status: SHIPPED | OUTPATIENT
Start: 2024-09-28 | End: 2024-10-01

## 2024-09-28 RX ADMIN — AMPICILLIN AND SULBACTAM 3000 MG: 2; 1 INJECTION, POWDER, FOR SOLUTION INTRAVENOUS at 03:52

## 2024-09-28 RX ADMIN — AMPICILLIN AND SULBACTAM 3000 MG: 2; 1 INJECTION, POWDER, FOR SOLUTION INTRAVENOUS at 08:55

## 2024-09-28 RX ADMIN — DOXEPIN HYDROCHLORIDE 10 MG: 10 CAPSULE ORAL at 23:21

## 2024-09-28 RX ADMIN — SODIUM CHLORIDE, PRESERVATIVE FREE 10 ML: 5 INJECTION INTRAVENOUS at 08:56

## 2024-09-28 RX ADMIN — ATENOLOL 50 MG: 50 TABLET ORAL at 08:55

## 2024-09-28 RX ADMIN — SODIUM CHLORIDE 25 ML: 9 INJECTION, SOLUTION INTRAVENOUS at 13:54

## 2024-09-28 RX ADMIN — ENOXAPARIN SODIUM 30 MG: 100 INJECTION SUBCUTANEOUS at 21:25

## 2024-09-28 RX ADMIN — PREDNISONE 20 MG: 20 TABLET ORAL at 12:23

## 2024-09-28 RX ADMIN — METRONIDAZOLE 500 MG: 500 TABLET ORAL at 14:56

## 2024-09-28 RX ADMIN — SODIUM CHLORIDE 25 ML: 9 INJECTION, SOLUTION INTRAVENOUS at 08:54

## 2024-09-28 RX ADMIN — SODIUM CHLORIDE: 9 INJECTION, SOLUTION INTRAVENOUS at 03:51

## 2024-09-28 RX ADMIN — SODIUM CHLORIDE, POTASSIUM CHLORIDE, SODIUM LACTATE AND CALCIUM CHLORIDE: 600; 310; 30; 20 INJECTION, SOLUTION INTRAVENOUS at 09:32

## 2024-09-28 RX ADMIN — DAPTOMYCIN 700 MG: 500 INJECTION, POWDER, LYOPHILIZED, FOR SOLUTION INTRAVENOUS at 13:55

## 2024-09-28 RX ADMIN — ASPIRIN 325 MG: 325 TABLET ORAL at 08:55

## 2024-09-28 RX ADMIN — SODIUM CHLORIDE, POTASSIUM CHLORIDE, SODIUM LACTATE AND CALCIUM CHLORIDE: 600; 310; 30; 20 INJECTION, SOLUTION INTRAVENOUS at 23:54

## 2024-09-28 RX ADMIN — PANTOPRAZOLE SODIUM 40 MG: 40 TABLET, DELAYED RELEASE ORAL at 06:10

## 2024-09-28 RX ADMIN — SODIUM CHLORIDE, PRESERVATIVE FREE 10 ML: 5 INJECTION INTRAVENOUS at 21:26

## 2024-09-28 RX ADMIN — CETIRIZINE HYDROCHLORIDE 10 MG: 10 TABLET, FILM COATED ORAL at 12:23

## 2024-09-28 RX ADMIN — METRONIDAZOLE 500 MG: 500 TABLET ORAL at 21:25

## 2024-09-28 RX ADMIN — ENOXAPARIN SODIUM 30 MG: 100 INJECTION SUBCUTANEOUS at 08:55

## 2024-09-28 ASSESSMENT — PAIN SCALES - GENERAL
PAINLEVEL_OUTOF10: 0

## 2024-09-29 LAB
ALBUMIN SERPL-MCNC: 2.9 G/DL (ref 3.4–5)
ALP SERPL-CCNC: 84 U/L (ref 40–129)
ALT SERPL-CCNC: 50 U/L (ref 10–40)
ANION GAP SERPL CALCULATED.3IONS-SCNC: 8 MMOL/L (ref 3–16)
AST SERPL-CCNC: 29 U/L (ref 15–37)
BASOPHILS # BLD: 0.1 K/UL (ref 0–0.2)
BASOPHILS NFR BLD: 1.1 %
BILIRUB DIRECT SERPL-MCNC: 0.2 MG/DL (ref 0–0.3)
BILIRUB INDIRECT SERPL-MCNC: 0.1 MG/DL (ref 0–1)
BILIRUB SERPL-MCNC: 0.3 MG/DL (ref 0–1)
BUN SERPL-MCNC: 17 MG/DL (ref 7–20)
CALCIUM SERPL-MCNC: 8.8 MG/DL (ref 8.3–10.6)
CHLORIDE SERPL-SCNC: 103 MMOL/L (ref 99–110)
CK SERPL-CCNC: 19 U/L (ref 39–308)
CO2 SERPL-SCNC: 29 MMOL/L (ref 21–32)
CREAT SERPL-MCNC: 0.8 MG/DL (ref 0.8–1.3)
DEPRECATED RDW RBC AUTO: 15 % (ref 12.4–15.4)
EOSINOPHIL # BLD: 0.1 K/UL (ref 0–0.6)
EOSINOPHIL NFR BLD: 1.3 %
GFR SERPLBLD CREATININE-BSD FMLA CKD-EPI: >90 ML/MIN/{1.73_M2}
GLUCOSE SERPL-MCNC: 89 MG/DL (ref 70–99)
HCT VFR BLD AUTO: 36.5 % (ref 40.5–52.5)
HGB BLD-MCNC: 12.1 G/DL (ref 13.5–17.5)
LYMPHOCYTES # BLD: 1.4 K/UL (ref 1–5.1)
LYMPHOCYTES NFR BLD: 18.6 %
MAGNESIUM SERPL-MCNC: 2 MG/DL (ref 1.8–2.4)
MCH RBC QN AUTO: 30.2 PG (ref 26–34)
MCHC RBC AUTO-ENTMCNC: 33.1 G/DL (ref 31–36)
MCV RBC AUTO: 91.3 FL (ref 80–100)
MONOCYTES # BLD: 0.5 K/UL (ref 0–1.3)
MONOCYTES NFR BLD: 6.9 %
NEUTROPHILS # BLD: 5.3 K/UL (ref 1.7–7.7)
NEUTROPHILS NFR BLD: 72.1 %
PHOSPHATE SERPL-MCNC: 3.2 MG/DL (ref 2.5–4.9)
PLATELET # BLD AUTO: 250 K/UL (ref 135–450)
PMV BLD AUTO: 7.5 FL (ref 5–10.5)
POTASSIUM SERPL-SCNC: 4.2 MMOL/L (ref 3.5–5.1)
PROT SERPL-MCNC: 6.2 G/DL (ref 6.4–8.2)
RBC # BLD AUTO: 4 M/UL (ref 4.2–5.9)
SODIUM SERPL-SCNC: 140 MMOL/L (ref 136–145)
WBC # BLD AUTO: 7.3 K/UL (ref 4–11)

## 2024-09-29 PROCEDURE — 6360000002 HC RX W HCPCS

## 2024-09-29 PROCEDURE — 80069 RENAL FUNCTION PANEL: CPT

## 2024-09-29 PROCEDURE — 6370000000 HC RX 637 (ALT 250 FOR IP)

## 2024-09-29 PROCEDURE — 1200000000 HC SEMI PRIVATE

## 2024-09-29 PROCEDURE — 82550 ASSAY OF CK (CPK): CPT

## 2024-09-29 PROCEDURE — 2580000003 HC RX 258

## 2024-09-29 PROCEDURE — 6370000000 HC RX 637 (ALT 250 FOR IP): Performed by: INTERNAL MEDICINE

## 2024-09-29 PROCEDURE — 2580000003 HC RX 258: Performed by: INTERNAL MEDICINE

## 2024-09-29 PROCEDURE — 80076 HEPATIC FUNCTION PANEL: CPT

## 2024-09-29 PROCEDURE — 99232 SBSQ HOSP IP/OBS MODERATE 35: CPT | Performed by: HOSPITALIST

## 2024-09-29 PROCEDURE — 83735 ASSAY OF MAGNESIUM: CPT

## 2024-09-29 PROCEDURE — 6360000002 HC RX W HCPCS: Performed by: INTERNAL MEDICINE

## 2024-09-29 PROCEDURE — 85025 COMPLETE CBC W/AUTO DIFF WBC: CPT

## 2024-09-29 RX ADMIN — SODIUM CHLORIDE, PRESERVATIVE FREE 10 ML: 5 INJECTION INTRAVENOUS at 08:44

## 2024-09-29 RX ADMIN — PREDNISONE 20 MG: 20 TABLET ORAL at 08:52

## 2024-09-29 RX ADMIN — METRONIDAZOLE 500 MG: 500 TABLET ORAL at 22:00

## 2024-09-29 RX ADMIN — PANTOPRAZOLE SODIUM 40 MG: 40 TABLET, DELAYED RELEASE ORAL at 06:11

## 2024-09-29 RX ADMIN — ASPIRIN 325 MG: 325 TABLET ORAL at 08:52

## 2024-09-29 RX ADMIN — DOXEPIN HYDROCHLORIDE 10 MG: 10 CAPSULE ORAL at 20:07

## 2024-09-29 RX ADMIN — ENOXAPARIN SODIUM 30 MG: 100 INJECTION SUBCUTANEOUS at 08:52

## 2024-09-29 RX ADMIN — SODIUM CHLORIDE, PRESERVATIVE FREE 10 ML: 5 INJECTION INTRAVENOUS at 08:43

## 2024-09-29 RX ADMIN — CETIRIZINE HYDROCHLORIDE 10 MG: 10 TABLET, FILM COATED ORAL at 08:52

## 2024-09-29 RX ADMIN — SODIUM CHLORIDE, POTASSIUM CHLORIDE, SODIUM LACTATE AND CALCIUM CHLORIDE 900 ML: 600; 310; 30; 20 INJECTION, SOLUTION INTRAVENOUS at 13:28

## 2024-09-29 RX ADMIN — ENOXAPARIN SODIUM 30 MG: 100 INJECTION SUBCUTANEOUS at 20:07

## 2024-09-29 RX ADMIN — METRONIDAZOLE 500 MG: 500 TABLET ORAL at 06:11

## 2024-09-29 RX ADMIN — SODIUM CHLORIDE 25 ML: 9 INJECTION, SOLUTION INTRAVENOUS at 13:30

## 2024-09-29 RX ADMIN — DAPTOMYCIN 700 MG: 500 INJECTION, POWDER, LYOPHILIZED, FOR SOLUTION INTRAVENOUS at 13:31

## 2024-09-29 RX ADMIN — METRONIDAZOLE 500 MG: 500 TABLET ORAL at 13:31

## 2024-09-29 ASSESSMENT — PAIN SCALES - GENERAL
PAINLEVEL_OUTOF10: 0
PAINLEVEL_OUTOF10: 0

## 2024-09-30 PROBLEM — L27.0 DRUG-INDUCED SKIN RASH: Status: ACTIVE | Noted: 2024-09-30

## 2024-09-30 LAB
ALBUMIN SERPL-MCNC: 3.2 G/DL (ref 3.4–5)
ANION GAP SERPL CALCULATED.3IONS-SCNC: 8 MMOL/L (ref 3–16)
BASOPHILS # BLD: 0.1 K/UL (ref 0–0.2)
BASOPHILS NFR BLD: 0.7 %
BUN SERPL-MCNC: 19 MG/DL (ref 7–20)
CALCIUM SERPL-MCNC: 8.5 MG/DL (ref 8.3–10.6)
CHLORIDE SERPL-SCNC: 105 MMOL/L (ref 99–110)
CO2 SERPL-SCNC: 28 MMOL/L (ref 21–32)
CREAT SERPL-MCNC: 0.9 MG/DL (ref 0.8–1.3)
DEPRECATED RDW RBC AUTO: 14.9 % (ref 12.4–15.4)
ECHO BSA: 2.45 M2
EOSINOPHIL # BLD: 0.1 K/UL (ref 0–0.6)
EOSINOPHIL NFR BLD: 1.3 %
GFR SERPLBLD CREATININE-BSD FMLA CKD-EPI: 89 ML/MIN/{1.73_M2}
GLUCOSE SERPL-MCNC: 79 MG/DL (ref 70–99)
HCT VFR BLD AUTO: 37.6 % (ref 40.5–52.5)
HGB BLD-MCNC: 12.5 G/DL (ref 13.5–17.5)
LYMPHOCYTES # BLD: 2 K/UL (ref 1–5.1)
LYMPHOCYTES NFR BLD: 24.5 %
MAGNESIUM SERPL-MCNC: 1.9 MG/DL (ref 1.8–2.4)
MCH RBC QN AUTO: 30.6 PG (ref 26–34)
MCHC RBC AUTO-ENTMCNC: 33.3 G/DL (ref 31–36)
MCV RBC AUTO: 91.7 FL (ref 80–100)
MONOCYTES # BLD: 0.5 K/UL (ref 0–1.3)
MONOCYTES NFR BLD: 6.3 %
NEUTROPHILS # BLD: 5.5 K/UL (ref 1.7–7.7)
NEUTROPHILS NFR BLD: 67.2 %
PHOSPHATE SERPL-MCNC: 3.2 MG/DL (ref 2.5–4.9)
PLATELET # BLD AUTO: 255 K/UL (ref 135–450)
PMV BLD AUTO: 7.8 FL (ref 5–10.5)
POTASSIUM SERPL-SCNC: 4 MMOL/L (ref 3.5–5.1)
RBC # BLD AUTO: 4.1 M/UL (ref 4.2–5.9)
SODIUM SERPL-SCNC: 141 MMOL/L (ref 136–145)
WBC # BLD AUTO: 8.3 K/UL (ref 4–11)

## 2024-09-30 PROCEDURE — 2580000003 HC RX 258: Performed by: INTERNAL MEDICINE

## 2024-09-30 PROCEDURE — 1200000000 HC SEMI PRIVATE

## 2024-09-30 PROCEDURE — 6360000002 HC RX W HCPCS

## 2024-09-30 PROCEDURE — 6370000000 HC RX 637 (ALT 250 FOR IP)

## 2024-09-30 PROCEDURE — 6360000002 HC RX W HCPCS: Performed by: INTERNAL MEDICINE

## 2024-09-30 PROCEDURE — 97530 THERAPEUTIC ACTIVITIES: CPT

## 2024-09-30 PROCEDURE — 2580000003 HC RX 258

## 2024-09-30 PROCEDURE — 83735 ASSAY OF MAGNESIUM: CPT

## 2024-09-30 PROCEDURE — 99232 SBSQ HOSP IP/OBS MODERATE 35: CPT | Performed by: HOSPITALIST

## 2024-09-30 PROCEDURE — 99233 SBSQ HOSP IP/OBS HIGH 50: CPT | Performed by: INTERNAL MEDICINE

## 2024-09-30 PROCEDURE — 85025 COMPLETE CBC W/AUTO DIFF WBC: CPT

## 2024-09-30 PROCEDURE — 97535 SELF CARE MNGMENT TRAINING: CPT

## 2024-09-30 PROCEDURE — 80069 RENAL FUNCTION PANEL: CPT

## 2024-09-30 PROCEDURE — 6370000000 HC RX 637 (ALT 250 FOR IP): Performed by: INTERNAL MEDICINE

## 2024-09-30 RX ORDER — ATENOLOL 25 MG/1
25 TABLET ORAL ONCE
Status: COMPLETED | OUTPATIENT
Start: 2024-09-30 | End: 2024-09-30

## 2024-09-30 RX ADMIN — PREDNISONE 20 MG: 20 TABLET ORAL at 08:41

## 2024-09-30 RX ADMIN — ASPIRIN 325 MG: 325 TABLET ORAL at 08:41

## 2024-09-30 RX ADMIN — PIPERACILLIN AND TAZOBACTAM 4500 MG: 4; .5 INJECTION, POWDER, LYOPHILIZED, FOR SOLUTION INTRAVENOUS; PARENTERAL at 18:51

## 2024-09-30 RX ADMIN — ENOXAPARIN SODIUM 30 MG: 100 INJECTION SUBCUTANEOUS at 08:42

## 2024-09-30 RX ADMIN — SODIUM CHLORIDE 25 ML: 9 INJECTION, SOLUTION INTRAVENOUS at 10:31

## 2024-09-30 RX ADMIN — METRONIDAZOLE 500 MG: 500 TABLET ORAL at 06:09

## 2024-09-30 RX ADMIN — SODIUM CHLORIDE 25 ML: 9 INJECTION, SOLUTION INTRAVENOUS at 18:49

## 2024-09-30 RX ADMIN — ATENOLOL 25 MG: 25 TABLET ORAL at 09:37

## 2024-09-30 RX ADMIN — SODIUM CHLORIDE, PRESERVATIVE FREE 10 ML: 5 INJECTION INTRAVENOUS at 08:42

## 2024-09-30 RX ADMIN — PANTOPRAZOLE SODIUM 40 MG: 40 TABLET, DELAYED RELEASE ORAL at 06:14

## 2024-09-30 RX ADMIN — SODIUM CHLORIDE, PRESERVATIVE FREE 10 ML: 5 INJECTION INTRAVENOUS at 20:22

## 2024-09-30 RX ADMIN — ENOXAPARIN SODIUM 30 MG: 100 INJECTION SUBCUTANEOUS at 20:03

## 2024-09-30 RX ADMIN — SODIUM CHLORIDE, POTASSIUM CHLORIDE, SODIUM LACTATE AND CALCIUM CHLORIDE: 600; 310; 30; 20 INJECTION, SOLUTION INTRAVENOUS at 01:29

## 2024-09-30 RX ADMIN — DOXEPIN HYDROCHLORIDE 10 MG: 10 CAPSULE ORAL at 20:03

## 2024-09-30 RX ADMIN — CETIRIZINE HYDROCHLORIDE 10 MG: 10 TABLET, FILM COATED ORAL at 08:42

## 2024-09-30 RX ADMIN — PIPERACILLIN AND TAZOBACTAM 4500 MG: 4; .5 INJECTION, POWDER, LYOPHILIZED, FOR SOLUTION INTRAVENOUS; PARENTERAL at 10:32

## 2024-09-30 ASSESSMENT — PAIN SCALES - GENERAL
PAINLEVEL_OUTOF10: 0

## 2024-10-01 VITALS
WEIGHT: 243.61 LBS | BODY MASS INDEX: 28.76 KG/M2 | SYSTOLIC BLOOD PRESSURE: 147 MMHG | RESPIRATION RATE: 17 BRPM | HEART RATE: 50 BPM | DIASTOLIC BLOOD PRESSURE: 78 MMHG | OXYGEN SATURATION: 96 % | TEMPERATURE: 97.9 F | HEIGHT: 77 IN

## 2024-10-01 LAB
ALBUMIN SERPL-MCNC: 3 G/DL (ref 3.4–5)
ALBUMIN SERPL-MCNC: 3.2 G/DL (ref 3.4–5)
ALP SERPL-CCNC: 78 U/L (ref 40–129)
ALT SERPL-CCNC: 43 U/L (ref 10–40)
ANION GAP SERPL CALCULATED.3IONS-SCNC: 7 MMOL/L (ref 3–16)
AST SERPL-CCNC: 29 U/L (ref 15–37)
BASOPHILS # BLD: 0.1 K/UL (ref 0–0.2)
BASOPHILS NFR BLD: 1.1 %
BILIRUB DIRECT SERPL-MCNC: 0.2 MG/DL (ref 0–0.3)
BILIRUB INDIRECT SERPL-MCNC: 0.1 MG/DL (ref 0–1)
BILIRUB SERPL-MCNC: 0.3 MG/DL (ref 0–1)
BUN SERPL-MCNC: 21 MG/DL (ref 7–20)
CALCIUM SERPL-MCNC: 8.3 MG/DL (ref 8.3–10.6)
CHLORIDE SERPL-SCNC: 105 MMOL/L (ref 99–110)
CO2 SERPL-SCNC: 28 MMOL/L (ref 21–32)
CREAT SERPL-MCNC: 1 MG/DL (ref 0.8–1.3)
DEPRECATED RDW RBC AUTO: 15.2 % (ref 12.4–15.4)
EOSINOPHIL # BLD: 0.1 K/UL (ref 0–0.6)
EOSINOPHIL NFR BLD: 1.3 %
GFR SERPLBLD CREATININE-BSD FMLA CKD-EPI: 78 ML/MIN/{1.73_M2}
GLUCOSE SERPL-MCNC: 72 MG/DL (ref 70–99)
HCT VFR BLD AUTO: 37.4 % (ref 40.5–52.5)
HGB BLD-MCNC: 12.4 G/DL (ref 13.5–17.5)
LYMPHOCYTES # BLD: 1.1 K/UL (ref 1–5.1)
LYMPHOCYTES NFR BLD: 15.3 %
MAGNESIUM SERPL-MCNC: 2 MG/DL (ref 1.8–2.4)
MCH RBC QN AUTO: 30.6 PG (ref 26–34)
MCHC RBC AUTO-ENTMCNC: 33.3 G/DL (ref 31–36)
MCV RBC AUTO: 92.1 FL (ref 80–100)
MONOCYTES # BLD: 0.5 K/UL (ref 0–1.3)
MONOCYTES NFR BLD: 7.6 %
NEUTROPHILS # BLD: 5.2 K/UL (ref 1.7–7.7)
NEUTROPHILS NFR BLD: 74.7 %
PHOSPHATE SERPL-MCNC: 3.6 MG/DL (ref 2.5–4.9)
PLATELET # BLD AUTO: 241 K/UL (ref 135–450)
PMV BLD AUTO: 7.7 FL (ref 5–10.5)
POTASSIUM SERPL-SCNC: 4.1 MMOL/L (ref 3.5–5.1)
PROT SERPL-MCNC: 5.9 G/DL (ref 6.4–8.2)
RBC # BLD AUTO: 4.06 M/UL (ref 4.2–5.9)
SODIUM SERPL-SCNC: 140 MMOL/L (ref 136–145)
WBC # BLD AUTO: 6.9 K/UL (ref 4–11)

## 2024-10-01 PROCEDURE — 2580000003 HC RX 258: Performed by: INTERNAL MEDICINE

## 2024-10-01 PROCEDURE — 80069 RENAL FUNCTION PANEL: CPT

## 2024-10-01 PROCEDURE — 6370000000 HC RX 637 (ALT 250 FOR IP)

## 2024-10-01 PROCEDURE — 99232 SBSQ HOSP IP/OBS MODERATE 35: CPT | Performed by: INTERNAL MEDICINE

## 2024-10-01 PROCEDURE — 80076 HEPATIC FUNCTION PANEL: CPT

## 2024-10-01 PROCEDURE — 83735 ASSAY OF MAGNESIUM: CPT

## 2024-10-01 PROCEDURE — 99233 SBSQ HOSP IP/OBS HIGH 50: CPT | Performed by: HOSPITALIST

## 2024-10-01 PROCEDURE — 2580000003 HC RX 258

## 2024-10-01 PROCEDURE — 6360000002 HC RX W HCPCS: Performed by: INTERNAL MEDICINE

## 2024-10-01 PROCEDURE — 85025 COMPLETE CBC W/AUTO DIFF WBC: CPT

## 2024-10-01 PROCEDURE — 6360000002 HC RX W HCPCS

## 2024-10-01 RX ORDER — ATORVASTATIN CALCIUM 40 MG/1
40 TABLET, FILM COATED ORAL NIGHTLY
Qty: 30 TABLET | Refills: 3 | Status: SHIPPED | OUTPATIENT
Start: 2024-10-01

## 2024-10-01 RX ORDER — ATORVASTATIN CALCIUM 40 MG/1
40 TABLET, FILM COATED ORAL NIGHTLY
Status: DISCONTINUED | OUTPATIENT
Start: 2024-10-01 | End: 2024-10-01 | Stop reason: HOSPADM

## 2024-10-01 RX ORDER — CETIRIZINE HYDROCHLORIDE 10 MG/1
10 TABLET ORAL DAILY
Qty: 10 TABLET | Refills: 0 | Status: SHIPPED | OUTPATIENT
Start: 2024-10-01 | End: 2024-10-11

## 2024-10-01 RX ORDER — CETIRIZINE HYDROCHLORIDE 10 MG/1
10 TABLET ORAL DAILY
Qty: 10 TABLET | Refills: 0 | Status: SHIPPED | OUTPATIENT
Start: 2024-10-01 | End: 2024-10-01

## 2024-10-01 RX ORDER — PREDNISONE 20 MG/1
20 TABLET ORAL DAILY
Qty: 1 TABLET | Refills: 0 | Status: SHIPPED | OUTPATIENT
Start: 2024-10-01 | End: 2024-10-01

## 2024-10-01 RX ORDER — PREDNISONE 20 MG/1
20 TABLET ORAL DAILY
Qty: 1 TABLET | Refills: 0 | Status: SHIPPED | OUTPATIENT
Start: 2024-10-01 | End: 2024-10-02

## 2024-10-01 RX ADMIN — PANTOPRAZOLE SODIUM 40 MG: 40 TABLET, DELAYED RELEASE ORAL at 06:14

## 2024-10-01 RX ADMIN — PREDNISONE 20 MG: 20 TABLET ORAL at 09:02

## 2024-10-01 RX ADMIN — SODIUM CHLORIDE 25 ML: 9 INJECTION, SOLUTION INTRAVENOUS at 10:19

## 2024-10-01 RX ADMIN — ASPIRIN 325 MG: 325 TABLET ORAL at 09:02

## 2024-10-01 RX ADMIN — PIPERACILLIN AND TAZOBACTAM 4500 MG: 4; .5 INJECTION, POWDER, LYOPHILIZED, FOR SOLUTION INTRAVENOUS; PARENTERAL at 01:59

## 2024-10-01 RX ADMIN — SODIUM CHLORIDE, PRESERVATIVE FREE 10 ML: 5 INJECTION INTRAVENOUS at 09:10

## 2024-10-01 RX ADMIN — ENOXAPARIN SODIUM 30 MG: 100 INJECTION SUBCUTANEOUS at 09:02

## 2024-10-01 RX ADMIN — SODIUM CHLORIDE, PRESERVATIVE FREE 10 ML: 5 INJECTION INTRAVENOUS at 09:13

## 2024-10-01 RX ADMIN — ATENOLOL 50 MG: 50 TABLET ORAL at 10:18

## 2024-10-01 RX ADMIN — PIPERACILLIN AND TAZOBACTAM 3375 MG: 3; .375 INJECTION, POWDER, LYOPHILIZED, FOR SOLUTION INTRAVENOUS at 10:20

## 2024-10-01 RX ADMIN — CETIRIZINE HYDROCHLORIDE 10 MG: 10 TABLET, FILM COATED ORAL at 09:02

## 2024-10-01 ASSESSMENT — PAIN SCALES - GENERAL: PAINLEVEL_OUTOF10: 0

## 2024-10-01 NOTE — CONSULTS
Clinical Pharmacy Progress Note    Vancomycin has been discontinued - Pharmacy will sign off on dosing  If resumed, please re-consult Pharmacy     Please call with questions:  [phone] (Main Pharmacy)    Arabella Bradford  Pharm.D. BCPS    9/24/2024 12:45 PM            
Department of Podiatry Consult Note  Resident       Reason for Consult:  poorly healing foot ulcer   Requesting Physician:  Frantz Irizarry MD/Yonathan Medina DO    CHIEF COMPLAINT:  Left foot wounds     HISTORY OF PRESENT ILLNESS:      The patient is a 74 y.o. male with significant past medical history as listed below. Podiatry was consulted for dorsal lateral foot wound with overlying necrotic.  Patient is known to Dr. Jonathan Lane and has been seeing him for several years for his wound care as well as prior TMA of his LLE.  Patient states that over the past several days he has noticed increased redness to his left lower extremity as well as minimal pain.  Patient notes that he has been applying a silver Silvadene cream as directed at his last visit with Dr. Lane to both of his wounds.  Patient denies any known trauma to the area and states that he has been wearing diabetic shoes whenever he is ambulating with custom inserts and prostheses within them.  Patient states that he had a subjective fever earlier this a.m., however is feeling much better at this point.  Patient chills, nausea, vomiting, shortness of breath, chest pain.  Patient has no other pedal complaints at this time.    Past Medical History:        Diagnosis Date    Amputation of foot (HCC) 03/12/2024    Chest pain 01/02/2015    Chronic GERD     ESBL (extended spectrum beta-lactamase) producing bacteria infection 09/11/2016    E.coli ESBL-8/2015    Gastroesophageal reflux disease without esophagitis 03/12/2024    H/O gastric bypass 03/12/2024    Hepatitis C virus 01/02/2015    says he received treatment    Hx of hepatitis C 01/16/2015    Hyperlipidemia     Hypertension     Hypertension     Hypertension, essential     MDRO (multiple drug resistant organisms) resistance 09/10/2016    Pseudomonas MDRO in foot 6/23/16    MDRO (multiple drug resistant organisms) resistance 9/22/17; 09/08/2017    Pseudomonas MDR in wound    Morbid obesity (HCC)     Non-pressure 
General Surgery   Resident Consult Note    Reason for Consult: LLE monophasic waveform on arterial duplex    History of Present Illness:   Dominick Aguayo is a 74 y.o. 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 The patient was found to have poor bleeding from his left foot in the operating room. The patient had a bilateral lower extremity arterial duplex completed that showed diffuse multiphasic waveform in the left lower extremity with ABIs of 1. The patient still ambulated on his bilateral midfoot amputations. The patient denies any cramping pains in his leg or thighs at rest or with movement. He has not had any vascular surgical intervention.     Vascular surgery was consulted to evaluate LLE monophasic waveform on arterial duplex.     Past Medical History:        Diagnosis Date    Amputation of foot (HCC) 03/12/2024    Chest pain 01/02/2015    Chronic GERD     ESBL (extended spectrum beta-lactamase) producing bacteria infection 09/11/2016    E.coli ESBL-8/2015    Gastroesophageal reflux disease without esophagitis 03/12/2024    H/O gastric bypass 03/12/2024    Hepatitis C virus 01/02/2015    says he received treatment    Hx of hepatitis C 01/16/2015    Hyperlipidemia     Hypertension     Hypertension     Hypertension, essential     MDRO (multiple drug resistant organisms) resistance 09/10/2016    Pseudomonas MDRO in foot 6/23/16    MDRO (multiple drug resistant organisms) resistance 9/22/17; 09/08/2017    Pseudomonas MDR in wound    Morbid obesity (HCC)     Non-pressure chronic ulcer of other part of left foot limited to breakdown of skin (HCC)     Non-pressure chronic ulcer of other part of right foot limited to breakdown of skin (HCC)     Obstructive sleep apnea     Partial nontraumatic amputation of left foot (HCC) 03/12/2024    
[facility] -  Clinical Pharmacy Note    Vancomycin - Management by Pharmacy    Consult Date(s): 9/23/24  Consulting Provider(s): Johan     Assessment / Plan  Diabetic foot infection  - Vancomycin  Concurrent Antimicrobials:   Cefepime, clindamycin  Day of Vanc Therapy / Ordered Duration: #1  Current Dosing Method: Bayesian-Guided AUC Dosing  Therapeutic Goal: -600 mg/L*hr  Current Dose / Plan:   Admitted with DEBBIE, which is improving. SCr 1.8 on admit -- down to 1.1 today  Baseline SCr ~0.9  Received loading dose of vancomycin 2500mg IV x1 on 9/21  Will give 1000mg IV q12h  Regimen predicts AUC = 525 and trough = 16.8  Level ordered for tomorrow AM, Tues 9/24 to confirm kinetic estimates  Will continue to monitor clinical condition and make adjustments to regimen as appropriate.    Thank you for consulting pharmacy,    Please call with questions--  Nieves Donnelly, PharmD, BCPS  Wireless: [phone]   9/23/2024 9:24 AM        Interval update:  therapy initiation     Subjective/Objective:   Dominick Aguayo is a 74 y.o. male with a PMHx significant for T2DM, peripheral neuropathy, chronic foot ulcer, bilateral mid-foot amputation d/t acute OM, HTN, chronic hepatitis, s/p gastric sleeve (2023) who is admitted with L foot necrotizing foot infection and DEBBIE.     Pharmacy is consulted to dose Vancomycin.    Ht Readings from Last 1 Encounters:   09/21/24 1.956 m (6' 5\")     Wt Readings from Last 1 Encounters:   09/23/24 110.6 kg (243 lb 13.3 oz)     Current & Prior Antimicrobial Regimen(s):  Cefepime (9/22-current)  Clindamycin (9/22-current)  Vancomycin - Pharmacy to dose  2500mg IV x1 9/21  1000mg IV q12h (9/23-current)    Vancomycin Level(s) / Doses:    Date Time Dose Type of Level / Level Interpretation                 Note: Serum levels collected for AUC-based dosing may be high if collected in close proximity to the dose administered. This is not necessarily indicative of toxicity.    Cultures & 
Result      XR FOOT LEFT (MIN 3 VIEWS)   Final Result      1. Postop evaluation as described.      Electronically signed by Jose Alvarez      XR CHEST (2 VW)   Final Result      No evidence for acute cardiopulmonary disease         Electronically signed by You Stephen MD      MRI FOOT LEFT WO CONTRAST   Final Result      1.  Dorsal lateral forefoot soft tissue ulceration with osteomyelitis of the   fifth metatarsal base remnant.   2.  Plantar lateral soft tissue ulceration tracking medially to an 8 mm focus of   susceptibility along the plantar fascia, possible foreign body. This appears to   correspond to a rounded area of lucency on prior radiograph.      Electronically signed by Gabino Davis      XR FOOT LEFT (MIN 3 VIEWS)   Final Result      Status post transmetatarsal dictation. Soft tissue swelling at the stump. Probable packing of the lateral tissues of the stomach. Soft tissue gas at the stump could represent ulceration or soft tissue infection      Electronically signed by Van Clark      XR FOOT LEFT (MIN 3 VIEWS)   Final Result      Status post transmetatarsal amputation. Soft tissue swelling and gas seen at the stump, particularly at the medial aspect may represent gas forming soft tissue infection or ulceration. Slight cortical irregularity, cannot exclude osteomyelitis.      Electronically signed by Van Clark            Assessment:  1. L foot necrotizing infection  S/p I&D (9/24). Culture + for E faecalis and S viridans  PICC line placed 9/25. Currently on Zosyn per ID. Requiring extended IV abx through 11/14/2024  2. PAD  S/p LLE angiogram and angioplasty (9/26)   Started on aspirin 325mg daily   Holding stain given rash  3. DEBBIE (resolved)  4. HTN   Currently holding chlorthalidone 25mg daily given soft BP   Continue atenolol 50mg daily, hold for HR<60      Impairments- Decreased functional mobility, Decreased ADLs    Recommendations:    Patient requiring extended abx coverage through 11/14/2024. 
lucency on prior radiograph       IMPRESSION:      Patient Active Problem List   Diagnosis    Hx of hepatitis C    Peripheral polyneuropathy (HCC)    Essential hypertension    Idiopathic peripheral neuropathy    Gangrene of left foot (HCC)    History of ulcer of lower extremity    DEBBIE (acute kidney injury) (HCC)    Primary osteoarthritis of left knee    Chronic pain of left knee    Hiatal hernia    Thrombocytopenia, unspecified    Partial nontraumatic amputation of foot, right (HCC)    Gastroesophageal reflux disease without esophagitis    H/O gastric bypass    Partial nontraumatic amputation of left foot (HCC)       PMH -  neuropathy, HTN, MIKE, HCV, MARY  PSurgHx - gastric bypass, mult foot surg    Hx severe neuropathy (NOT DM).    Hx R TMA 9/13/16  Hx L  TMA 9/8/17   - L TMA stump infection / SSI  - s/p debridement 9/24, 9/27, 9/30  - hx MDR Pseudomonas   Hx DEBBIE / ARF on tobramycin, colistimate     L plantar foot ulcer.    IMP/  L foot limb necrotizing infection / limb threatening     RECOMMENDATIONS:    Start Zosyn + Daptomycin   D/c other antibiotics   Will f/u on Surg cult   Will review x-ray and MRI w Rad  Wound care  / return to OR per Podiatry    Medical Decision Making:  The following items were considered in medical decision making:  Discussion of patient care with other providers  Reviewed clinical lab tests  Reviewed radiology tests  Reviewed other diagnostic tests/interventions  Independent review of radiologic images - will review imaging w Radiologist  Microbiology cultures and other micro tests reviewed      Risk of Complications/Morbidity: High   Illness(es)/ Infection present that pose threat to bodily function.   There is potential for severe exacerbation of infection/side effects of treatment.  Therapy requires intensive monitoring for antimicrobial agent toxicity    Discussed with pt, Medical Residents and Attending (Dr Egan), Podiatry Residents    George Green MD

## 2024-10-01 NOTE — PLAN OF CARE
Problem: Discharge Planning  Goal: Discharge to home or other facility with appropriate resources  10/1/2024 0924 by Toshia Soni, RN  Outcome: Progressing  Flowsheets (Taken 10/1/2024 0924)  Discharge to home or other facility with appropriate resources:   Identify barriers to discharge with patient and caregiver   Identify discharge learning needs (meds, wound care, etc)   Refer to discharge planning if patient needs post-hospital services based on physician order or complex needs related to functional status, cognitive ability or social support system  Note: Patient awaiting CM for new discharge planning/insurance assistance     Problem: Pain  Goal: Verbalizes/displays adequate comfort level or baseline comfort level  10/1/2024 0924 by Toshia Soni, RN  Outcome: Progressing  Flowsheets (Taken 10/1/2024 0924)  Verbalizes/displays adequate comfort level or baseline comfort level:   Encourage patient to monitor pain and request assistance   Administer analgesics based on type and severity of pain and evaluate response   Consider cultural and social influences on pain and pain management  Note: Patient denies pain this shift, will continue to monitor patient pain levels and apply interventions as needed.      Problem: Safety - Adult  Goal: Free from fall injury  10/1/2024 0924 by Toshia Soni, RN  Outcome: Progressing  Flowsheets (Taken 10/1/2024 0924)  Free From Fall Injury: Instruct family/caregiver on patient safety  Note: Pt will remain free from accidental injury this shift. Pt has fall risk measures (fall risk bracelet, fall risk sign, fall risk cloth, non-slip socks, dome light on) in place. Pt bed is in low position, bed alarm on, bed wheels locked, call light within reach, bedside table within reach, chair wheels locked, chair alarm on.        Problem: ABCDS Injury Assessment  Goal: Absence of physical injury  10/1/2024 0924 by Toshia Soni, RN  Outcome: Progressing  Flowsheets (Taken 10/1/2024 0924)  Absence 
  Problem: Discharge Planning  Goal: Discharge to home or other facility with appropriate resources  9/25/2024 1116 by Mark Olguin RN  Outcome: Progressing  9/24/2024 2351 by Harpreet Hernandez RN  Outcome: Progressing  Flowsheets (Taken 9/22/2024 1921 by Ion Reynaga, RN)  Discharge to home or other facility with appropriate resources:   Identify barriers to discharge with patient and caregiver   Arrange for needed discharge resources and transportation as appropriate     Problem: Pain  Goal: Verbalizes/displays adequate comfort level or baseline comfort level  9/25/2024 1116 by Mark Olguin RN  Outcome: Progressing  9/24/2024 2351 by Harpreet Hernandez RN  Outcome: Progressing  Flowsheets (Taken 9/22/2024 1803 by Jefe Darden, RN)  Verbalizes/displays adequate comfort level or baseline comfort level:   Encourage patient to monitor pain and request assistance   Administer analgesics based on type and severity of pain and evaluate response   Assess pain using appropriate pain scale     Problem: Safety - Adult  Goal: Free from fall injury  9/25/2024 1116 by Mark Olguin RN  Outcome: Progressing  9/24/2024 2351 by Harpreet Hernandez RN  Outcome: Progressing  Flowsheets (Taken 9/24/2024 0428)  Free From Fall Injury: Instruct family/caregiver on patient safety     Problem: ABCDS Injury Assessment  Goal: Absence of physical injury  9/25/2024 1116 by Mark Olguin RN  Outcome: Progressing  9/24/2024 2351 by Harpreet Hernandez RN  Outcome: Progressing  Flowsheets (Taken 9/24/2024 0428)  Absence of Physical Injury: Implement safety measures based on patient assessment     Problem: Chronic Conditions and Co-morbidities  Goal: Patient's chronic conditions and co-morbidity symptoms are monitored and maintained or improved  9/25/2024 1116 by Mark Olguin RN  Outcome: Progressing  9/24/2024 2351 by Harpreet Hernandez RN  Outcome: Progressing  Flowsheets (Taken 9/24/2024 0428)  Care Plan - Patient's Chronic Conditions and Co-Morbidity Symptoms are 
  Problem: Discharge Planning  Goal: Discharge to home or other facility with appropriate resources  Outcome: Progressing  Flowsheets (Taken 9/22/2024 1921 by Ion Reynaga, RN)  Discharge to home or other facility with appropriate resources:   Identify barriers to discharge with patient and caregiver   Arrange for needed discharge resources and transportation as appropriate     Problem: Pain  Goal: Verbalizes/displays adequate comfort level or baseline comfort level  Outcome: Progressing  Flowsheets (Taken 9/22/2024 1803 by Jefe Darden, RN)  Verbalizes/displays adequate comfort level or baseline comfort level:   Encourage patient to monitor pain and request assistance   Administer analgesics based on type and severity of pain and evaluate response   Assess pain using appropriate pain scale     Problem: Safety - Adult  Goal: Free from fall injury  Outcome: Progressing  Flowsheets (Taken 9/24/2024 0428)  Free From Fall Injury: Instruct family/caregiver on patient safety     Problem: ABCDS Injury Assessment  Goal: Absence of physical injury  Outcome: Progressing  Flowsheets (Taken 9/24/2024 0428)  Absence of Physical Injury: Implement safety measures based on patient assessment     Problem: Chronic Conditions and Co-morbidities  Goal: Patient's chronic conditions and co-morbidity symptoms are monitored and maintained or improved  Outcome: Progressing  Flowsheets (Taken 9/24/2024 0428)  Care Plan - Patient's Chronic Conditions and Co-Morbidity Symptoms are Monitored and Maintained or Improved: Monitor and assess patient's chronic conditions and comorbid symptoms for stability, deterioration, or improvement     
  Problem: Discharge Planning  Goal: Discharge to home or other facility with appropriate resources  Outcome: Progressing  Flowsheets (Taken 9/22/2024 1921 by Ion Reynaga, RN)  Discharge to home or other facility with appropriate resources:   Identify barriers to discharge with patient and caregiver   Arrange for needed discharge resources and transportation as appropriate     Problem: Pain  Goal: Verbalizes/displays adequate comfort level or baseline comfort level  Outcome: Progressing  Flowsheets (Taken 9/22/2024 1803 by Jefe Darden, RN)  Verbalizes/displays adequate comfort level or baseline comfort level:   Encourage patient to monitor pain and request assistance   Administer analgesics based on type and severity of pain and evaluate response   Assess pain using appropriate pain scale     Problem: Safety - Adult  Goal: Free from fall injury  Outcome: Progressing  Flowsheets (Taken 9/24/2024 0428)  Free From Fall Injury: Instruct family/caregiver on patient safety     Problem: ABCDS Injury Assessment  Goal: Absence of physical injury  Outcome: Progressing  Flowsheets (Taken 9/24/2024 0428)  Absence of Physical Injury: Implement safety measures based on patient assessment     Problem: Chronic Conditions and Co-morbidities  Goal: Patient's chronic conditions and co-morbidity symptoms are monitored and maintained or improved  Outcome: Progressing  Flowsheets (Taken 9/24/2024 0428)  Care Plan - Patient's Chronic Conditions and Co-Morbidity Symptoms are Monitored and Maintained or Improved: Monitor and assess patient's chronic conditions and comorbid symptoms for stability, deterioration, or improvement     
  Problem: Discharge Planning  Goal: Discharge to home or other facility with appropriate resources  Outcome: Progressing  Flowsheets (Taken 9/26/2024 0156 by Isak Negrete, RN)  Discharge to home or other facility with appropriate resources:   Identify barriers to discharge with patient and caregiver   Arrange for needed discharge resources and transportation as appropriate     Problem: Safety - Adult  Goal: Free from fall injury  Outcome: Progressing  Flowsheets (Taken 9/26/2024 0156 by Isak Negrete, RN)  Free From Fall Injury: Instruct family/caregiver on patient safety     Problem: Chronic Conditions and Co-morbidities  Goal: Patient's chronic conditions and co-morbidity symptoms are monitored and maintained or improved  Outcome: Progressing  Flowsheets (Taken 9/26/2024 0156 by Isak Negrete, RN)  Care Plan - Patient's Chronic Conditions and Co-Morbidity Symptoms are Monitored and Maintained or Improved: Monitor and assess patient's chronic conditions and comorbid symptoms for stability, deterioration, or improvement     
  Problem: Discharge Planning  Goal: Discharge to home or other facility with appropriate resources  Outcome: Progressing  Flowsheets (Taken 9/26/2024 0156)  Discharge to home or other facility with appropriate resources:   Identify barriers to discharge with patient and caregiver   Arrange for needed discharge resources and transportation as appropriate     Problem: Pain  Goal: Verbalizes/displays adequate comfort level or baseline comfort level  Outcome: Progressing  Flowsheets (Taken 9/26/2024 0156)  Verbalizes/displays adequate comfort level or baseline comfort level:   Encourage patient to monitor pain and request assistance   Assess pain using appropriate pain scale   Implement non-pharmacological measures as appropriate and evaluate response   Consider cultural and social influences on pain and pain management     Problem: Safety - Adult  Goal: Free from fall injury  Outcome: Progressing  Flowsheets (Taken 9/26/2024 0156)  Free From Fall Injury: Instruct family/caregiver on patient safety     Problem: ABCDS Injury Assessment  Goal: Absence of physical injury  Outcome: Progressing  Flowsheets (Taken 9/26/2024 0156)  Absence of Physical Injury: Implement safety measures based on patient assessment     Problem: Chronic Conditions and Co-morbidities  Goal: Patient's chronic conditions and co-morbidity symptoms are monitored and maintained or improved  Outcome: Progressing  Flowsheets (Taken 9/26/2024 0156)  Care Plan - Patient's Chronic Conditions and Co-Morbidity Symptoms are Monitored and Maintained or Improved: Monitor and assess patient's chronic conditions and comorbid symptoms for stability, deterioration, or improvement     
  Problem: Discharge Planning  Goal: Discharge to home or other facility with appropriate resources  Outcome: Progressing  Flowsheets (Taken 9/28/2024 0301)  Discharge to home or other facility with appropriate resources: Identify barriers to discharge with patient and caregiver     Problem: Pain  Goal: Verbalizes/displays adequate comfort level or baseline comfort level  9/28/2024 0301 by Tam Cannon RN  Outcome: Progressing  Flowsheets (Taken 9/28/2024 0301)  Verbalizes/displays adequate comfort level or baseline comfort level:   Encourage patient to monitor pain and request assistance   Assess pain using appropriate pain scale   Implement non-pharmacological measures as appropriate and evaluate response  Note: Denied pain during assessment      Problem: Safety - Adult  Goal: Free from fall injury  9/28/2024 0301 by Tam Cannon RN  Outcome: Progressing  Flowsheets (Taken 9/28/2024 0301)  Free From Fall Injury: Instruct family/caregiver on patient safety  Note: Call light within reach, bed alarm on, bed at lowest position      Problem: ABCDS Injury Assessment  Goal: Absence of physical injury  Outcome: Progressing  Flowsheets (Taken 9/28/2024 0301)  Absence of Physical Injury: Implement safety measures based on patient assessment     Problem: Skin/Tissue Integrity - Adult  Goal: Incisions, wounds, or drain sites healing without S/S of infection  9/28/2024 0301 by Tam Cannon RN  Outcome: Progressing     
  Problem: Discharge Planning  Goal: Discharge to home or other facility with appropriate resources  Outcome: Progressing  Flowsheets (Taken 9/29/2024 0318 by Tam Cannon, RN)  Discharge to home or other facility with appropriate resources:   Identify barriers to discharge with patient and caregiver   Arrange for needed discharge resources and transportation as appropriate     Problem: Pain  Goal: Verbalizes/displays adequate comfort level or baseline comfort level  Outcome: Progressing  Flowsheets (Taken 9/30/2024 1041 by Alka Guevara, RN)  Verbalizes/displays adequate comfort level or baseline comfort level:   Encourage patient to monitor pain and request assistance   Administer analgesics based on type and severity of pain and evaluate response   Consider cultural and social influences on pain and pain management   Assess pain using appropriate pain scale   Implement non-pharmacological measures as appropriate and evaluate response   Notify Licensed Independent Practitioner if interventions unsuccessful or patient reports new pain  Note: No pain or discomfort throughout shift     Problem: Safety - Adult  Goal: Free from fall injury  Outcome: Progressing  Flowsheets (Taken 9/30/2024 1041 by Alka Guevara, RN)  Free From Fall Injury:   Instruct family/caregiver on patient safety   Based on caregiver fall risk screen, instruct family/caregiver to ask for assistance with transferring infant if caregiver noted to have fall risk factors     Problem: Chronic Conditions and Co-morbidities  Goal: Patient's chronic conditions and co-morbidity symptoms are monitored and maintained or improved  Outcome: Progressing  Flowsheets (Taken 9/29/2024 1238 by Alka Guevara, RN)  Care Plan - Patient's Chronic Conditions and Co-Morbidity Symptoms are Monitored and Maintained or Improved:   Monitor and assess patient's chronic conditions and comorbid symptoms for stability, deterioration, or improvement   Collaborate 
  Problem: Discharge Planning  Goal: Discharge to home or other facility with appropriate resources  Outcome: Progressing  Flowsheets (Taken 9/29/2024 0318 by Tam Cannon, RN)  Discharge to home or other facility with appropriate resources:   Identify barriers to discharge with patient and caregiver   Arrange for needed discharge resources and transportation as appropriate     Problem: Safety - Adult  Goal: Free from fall injury  Outcome: Progressing  Flowsheets (Taken 9/29/2024 0318 by Tam Cannon, RN)  Free From Fall Injury: Instruct family/caregiver on patient safety     Problem: Chronic Conditions and Co-morbidities  Goal: Patient's chronic conditions and co-morbidity symptoms are monitored and maintained or improved  Outcome: Progressing  Flowsheets (Taken 9/29/2024 1238 by Alka Guevara RN)  Care Plan - Patient's Chronic Conditions and Co-Morbidity Symptoms are Monitored and Maintained or Improved:   Monitor and assess patient's chronic conditions and comorbid symptoms for stability, deterioration, or improvement   Collaborate with multidisciplinary team to address chronic and comorbid conditions and prevent exacerbation or deterioration   Update acute care plan with appropriate goals if chronic or comorbid symptoms are exacerbated and prevent overall improvement and discharge     Problem: Skin/Tissue Integrity - Adult  Goal: Incisions, wounds, or drain sites healing without S/S of infection  Outcome: Progressing  Flowsheets (Taken 9/30/2024 0435)  Incisions, Wounds, or Drain Sites Healing Without Sign and Symptoms of Infection: TWICE DAILY: Assess and document skin integrity  Note: Pt dressing is clean and intact , wound care is following      
  Problem: Discharge Planning  Goal: Discharge to home or other facility with appropriate resources  Outcome: Progressing  Flowsheets (Taken 9/29/2024 0318)  Discharge to home or other facility with appropriate resources:   Identify barriers to discharge with patient and caregiver   Arrange for needed discharge resources and transportation as appropriate     Problem: Pain  Goal: Verbalizes/displays adequate comfort level or baseline comfort level  Outcome: Progressing  Flowsheets (Taken 9/29/2024 0318)  Verbalizes/displays adequate comfort level or baseline comfort level:   Encourage patient to monitor pain and request assistance   Assess pain using appropriate pain scale  Note: Denied pain during assessment      Problem: Safety - Adult  Goal: Free from fall injury  9/29/2024 0318 by Tam Cannon RN  Outcome: Progressing  Flowsheets (Taken 9/29/2024 0318)  Free From Fall Injury: Instruct family/caregiver on patient safety  Note: Call light within reach, bed alarm on, bed at lowest position .      Problem: ABCDS Injury Assessment  Goal: Absence of physical injury  Outcome: Progressing  Flowsheets (Taken 9/29/2024 0318)  Absence of Physical Injury: Implement safety measures based on patient assessment     Problem: Chronic Conditions and Co-morbidities  Goal: Patient's chronic conditions and co-morbidity symptoms are monitored and maintained or improved  Outcome: Progressing  Flowsheets (Taken 9/29/2024 0318)  Care Plan - Patient's Chronic Conditions and Co-Morbidity Symptoms are Monitored and Maintained or Improved: Monitor and assess patient's chronic conditions and comorbid symptoms for stability, deterioration, or improvement     
  Problem: Pain  Goal: Verbalizes/displays adequate comfort level or baseline comfort level  9/24/2024 0930 by Lia Kirby, RN  Flowsheets (Taken 9/22/2024 1803 by Jefe Darden RN)  Verbalizes/displays adequate comfort level or baseline comfort level:   Encourage patient to monitor pain and request assistance   Administer analgesics based on type and severity of pain and evaluate response   Assess pain using appropriate pain scale     
  Problem: Pain  Goal: Verbalizes/displays adequate comfort level or baseline comfort level  9/29/2024 1238 by Alka Guevara RN  Outcome: Progressing  Flowsheets (Taken 9/29/2024 1238)  Verbalizes/displays adequate comfort level or baseline comfort level:   Encourage patient to monitor pain and request assistance   Administer analgesics based on type and severity of pain and evaluate response   Consider cultural and social influences on pain and pain management   Notify Licensed Independent Practitioner if interventions unsuccessful or patient reports new pain   Implement non-pharmacological measures as appropriate and evaluate response   Assess pain using appropriate pain scale  Note: No complaints of pain this shift.     Problem: Chronic Conditions and Co-morbidities  Goal: Patient's chronic conditions and co-morbidity symptoms are monitored and maintained or improved  9/29/2024 1238 by Alka Guevara RN  Outcome: Progressing  Flowsheets (Taken 9/29/2024 1238)  Care Plan - Patient's Chronic Conditions and Co-Morbidity Symptoms are Monitored and Maintained or Improved:   Monitor and assess patient's chronic conditions and comorbid symptoms for stability, deterioration, or improvement   Collaborate with multidisciplinary team to address chronic and comorbid conditions and prevent exacerbation or deterioration   Update acute care plan with appropriate goals if chronic or comorbid symptoms are exacerbated and prevent overall improvement and discharge     Problem: Skin/Tissue Integrity  Goal: Absence of new skin breakdown  Description: 1.  Monitor for areas of redness and/or skin breakdown  2.  Assess vascular access sites hourly  3.  Every 4-6 hours minimum:  Change oxygen saturation probe site  4.  Every 4-6 hours:  If on nasal continuous positive airway pressure, respiratory therapy assess nares and determine need for appliance change or resting period.  Outcome: Progressing  Note: No new skin breakdown noted 
  Problem: Pain  Goal: Verbalizes/displays adequate comfort level or baseline comfort level  Flowsheets (Taken 9/22/2024 1803 by Jefe Darden RN)  Verbalizes/displays adequate comfort level or baseline comfort level:   Encourage patient to monitor pain and request assistance   Administer analgesics based on type and severity of pain and evaluate response   Assess pain using appropriate pain scale     
  Problem: Pain  Goal: Verbalizes/displays adequate comfort level or baseline comfort level  Outcome: Progressing  Flowsheets (Taken 9/30/2024 1041)  Verbalizes/displays adequate comfort level or baseline comfort level:   Encourage patient to monitor pain and request assistance   Administer analgesics based on type and severity of pain and evaluate response   Consider cultural and social influences on pain and pain management   Assess pain using appropriate pain scale   Implement non-pharmacological measures as appropriate and evaluate response   Notify Licensed Independent Practitioner if interventions unsuccessful or patient reports new pain  Note: No complaints of pain this shift.     Problem: Safety - Adult  Goal: Free from fall injury  9/30/2024 1041 by Alka Guevara, RN  Outcome: Progressing  Flowsheets (Taken 9/30/2024 1041)  Free From Fall Injury:   Instruct family/caregiver on patient safety   Based on caregiver fall risk screen, instruct family/caregiver to ask for assistance with transferring infant if caregiver noted to have fall risk factors  Note: Pt educated on fall risk scale, bed in lowest position, alarm in use. Able to tolerate stand pivot x1 with walker well. Reviewed meds with pt prior to admin.     Problem: Skin/Tissue Integrity  Goal: Absence of new skin breakdown  Description: 1.  Monitor for areas of redness and/or skin breakdown  2.  Assess vascular access sites hourly  3.  Every 4-6 hours minimum:  Change oxygen saturation probe site  4.  Every 4-6 hours:  If on nasal continuous positive airway pressure, respiratory therapy assess nares and determine need for appliance change or resting period.  9/30/2024 1041 by Alka Guevara, RN  Outcome: Progressing  Note: No new skin issues.     
  Problem: Pain  Goal: Verbalizes/displays adequate comfort level or baseline comfort level  Outcome: Progressing  Note: Pt does not complain of pain this shift and rates pain 0/10. No intervention needed at this time. Will continue to monitor.        Problem: Safety - Adult  Goal: Free from fall injury  Outcome: Progressing  Note: Pt with absence of falls this shift. Pt has nonskid socks in place and bed alarm is on.  Pt demonstrates safety awareness and calls to RN appropriately. Will continue to monitor.        Problem: Skin/Tissue Integrity - Adult  Goal: Incisions, wounds, or drain sites healing without S/S of infection  Outcome: Progressing  Note: Dressings to BLE intact this shift. No drainage in wound vac canister. IV antibiotics administered per orders. Will continue to monitor and assess.      
  Problem: Safety - Adult  Goal: Free from fall injury  Flowsheets (Taken 9/28/2024 0007)  Free From Fall Injury: Instruct family/caregiver on patient safety   Patient remains free of falls. Bed alarm on, bed in lowest position with wheels locked, call light within reach, continued purposeful hourly rounding.   
Podiatric Surgery Plan of Care Note  Dominick Aguayo     Patient off floor for angioplasty this a.m.  Will come by later this a.m./this afternoon for dressing change and further evaluation.    You Virk DPM, MS    Podiatric Resident, PGY-1   [facility]   Pager Number: [phone]  9/26/2024       
Podiatric Surgery Plan of Care Note  Dominicknina Aguayo    Patient is s/P bedside I&D performed in the ED.  Patient will require formal washout and tentative plan will be for patient to go Monday or Tuesday to the OR.  NWB LLE.  Surgical shoe ordered to room. recommend continue empiric antibiotic treatment at this time.  Full consultation note to follow.    You Virk DPM, MS    Podiatric Resident, PGY-1   [employer]   Pager Number: [phone]  9/21/2024   
assessment     Problem: Chronic Conditions and Co-morbidities  Goal: Patient's chronic conditions and co-morbidity symptoms are monitored and maintained or improved  10/1/2024 1420 by Toshia Soni RN  Outcome: Adequate for Discharge  10/1/2024 0924 by Toshia Soni RN  Outcome: Progressing  Flowsheets (Taken 10/1/2024 0924)  Care Plan - Patient's Chronic Conditions and Co-Morbidity Symptoms are Monitored and Maintained or Improved:   Monitor and assess patient's chronic conditions and comorbid symptoms for stability, deterioration, or improvement   Collaborate with multidisciplinary team to address chronic and comorbid conditions and prevent exacerbation or deterioration   Update acute care plan with appropriate goals if chronic or comorbid symptoms are exacerbated and prevent overall improvement and discharge  Note: Patient aware of own co-morbidities  10/1/2024 0410 by Keyanna Mayberry RN  Outcome: Progressing  Flowsheets (Taken 9/29/2024 1238 by Alka Guevara, RN)  Care Plan - Patient's Chronic Conditions and Co-Morbidity Symptoms are Monitored and Maintained or Improved:   Monitor and assess patient's chronic conditions and comorbid symptoms for stability, deterioration, or improvement   Collaborate with multidisciplinary team to address chronic and comorbid conditions and prevent exacerbation or deterioration   Update acute care plan with appropriate goals if chronic or comorbid symptoms are exacerbated and prevent overall improvement and discharge     Problem: Skin/Tissue Integrity - Adult  Goal: Incisions, wounds, or drain sites healing without S/S of infection  10/1/2024 1420 by Toshia Soni RN  Outcome: Adequate for Discharge  10/1/2024 0410 by Keyanna Mayberry RN  Outcome: Progressing  Flowsheets (Taken 9/30/2024 0435)  Incisions, Wounds, or Drain Sites Healing Without Sign and Symptoms of Infection: TWICE DAILY: Assess and document skin integrity  Note: Dressing are neat and intact on both foots

## 2024-10-01 NOTE — CARE COORDINATION
Anali Ramirez approved. Dates: 10/1/2024-10/3/2024 Auth ID: [id number]    Electronically signed by Gila Malcolm on 10/1/2024 at 12:09 PM

## 2024-10-01 NOTE — CARE COORDINATION
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]  Ulcer of left foot, unspecified ulcer stage (HCC) [L97.529]   Date / Time: 9/21/2024  3:53 PM    Current PCP: Van Egan MD  Clinic patient: Yes    Hospitalization in the last 30 days: No       Advance Directives:  Code Status: Full Code  [place] DNR form completed and on chart: Not Indicated    Financial:  Payor: [employer] MEDICARE / Plan: [employer] GOLD PLUS HMO / Product Type: *No Product type* /      Pharmacy:    [facility] - [facility] [address] - P [phone] - F [phone] [address] [facility] [address]  Phone: [phone] Fax: [phone]    [id number] - [place], [place] - [address] - P [phone] - F [phone]  [address] [facility] [address]  Phone: [phone] Fax: [phone]      Assistance purchasing medications?: Potential Assistance Purchasing Medications: No  Assistance provided by Case Management: None at this time    Does patient want to participate in local refill/ meds to beds program?: Yes    Meds To Beds General Rules:  1. Can ONLY be done Monday- Friday between 8:30am-5pm  2. Prescription(s) must be in pharmacy by 3pm to be filled same day  3.Copy of patient's insurance/ prescription drug card and patient face sheet must be sent along with the prescription(s)  4. Cost of Rx cannot be added to hospital bill. If financial assistance is needed, please contact unit  or ;  or  CANNOT provide pharmacy voucher for patients co-pays  5. Patients can then  the prescription on their way out of the hospital at discharge, or pharmacy can deliver to the

## 2024-10-01 NOTE — CARE COORDINATION
UPDATE:  Precert approved.    Electronically signed by DELMY Haley, RN Case Manager on 10/1/2024 at 12:39 PM      DISCHARGE PLANNING:    Chart review.    Patient from home alone.    Patient admitted with gangrene of left foot.  ID and podiatry following.  PICC line in place.  Needs long term IV ABX.  Patient initially was going to go home with [facility] for home infusions but is unable to afford medications.  ID made aware of patient inabiity to pay for medication and IV ABX were changed but still would cost  $64/day and patient is still unable to afford cost.  Patient will need SNF.    [facility] at [facility] accepted and precert started.      Electronically signed by DELMY Haley, RN Case Manager on 10/1/2024 at 12:08 PM

## 2024-10-02 ENCOUNTER — CLINICAL DOCUMENTATION (OUTPATIENT)
Dept: INFECTIOUS DISEASES | Age: 74
End: 2024-10-02

## 2024-10-02 ENCOUNTER — TELEPHONE (OUTPATIENT)
Dept: INFECTIOUS DISEASES | Age: 74
End: 2024-10-02

## 2024-10-02 DIAGNOSIS — L08.9 LEFT FOOT INFECTION: Primary | ICD-10-CM

## 2024-10-02 NOTE — PROGRESS NOTES
artery. There was good response to balloon angioplasty as described in the report.                                    Micro:   9/21 Wound/abscess foot Anaerobic and Aerobic: Prevotella bivia   9/23   Contains abnormal data Culture, Tissue  Order: [id number]  Status: Final result       Visible to patient: Yes (seen)       Next appt: None       Dx: Necrotizing soft tissue infection    Specimen Information: Tissue   0 Result Notes          Component   Resulting Agency   Gram Stain Result No WBCs or organisms seen  4+ RBC's  No Epithelial Cells seen [facility]   Organism Enterococcus faecalis Abnormal  [facility]   WOUND/ABSCESS Light growth [facility]   Organism Streptococcus viridans group Abnormal  [facility]   WOUND/ABSCESS Light growth  No further workup [facility]   Organism Prevotella bivia Abnormal  [facility]   Anaerobic Culture Light growth  Beta lactamase negative. Sensitivities not routinely done.  Drugs of choice: Penicillin G, Clindamycin or Metronidazole. [facility]   Organism Peptoniphilus asaccharolyticus Abnormal  [facility]   Anaerobic Culture Light growth  No further workup [facility]            Susceptibility     Enterococcus faecalis (1)     Antibiotic Interpretation Microscan   Method Status     ampicillin Sensitive <=2 mcg/mL BACTERIAL SUSCEPTIBILITY PANEL BY OLAYINKA       vancomycin Sensitive 1 mcg/mL BACTERIAL SUSCEPTIBILITY PANEL BY OLAYINKA                   [facility] Orders:  Diagnosis  Necrotizing soft tissue infection      I&D preformed (9/24, 9/27, 9/30)  During washout patient was found to have poor blood perfusion stents placed to restore flow    Antimicrobial Regimen and Projected Term Date Piperacillin-tazobactam 3.375 g every 8 hours until 11/14   Weekly Lab Monitoring CBCwDiff, CMP, CRP, and ESR   Any additional imaging or data needed prior to term? None   Any follow up in ID clinic? None

## 2024-10-02 NOTE — PROGRESS NOTES
[facility] - Clinical Pharmacy Note - Renal Dosing    Cefepime ordered for treatment of SSTI. Per [facility] Renal Dose Adjustment Policy, Cefepime 2000mg IV Q8H will be changed to 2000 mg Q12H.     Estimated Creatinine Clearance: Estimated Creatinine Clearance: 49 mL/min (A) (based on SCr of 1.8 mg/dL (H)).  Dialysis Status, DEBBIE, CKD: Baseline  BMI: Body mass index is 28.9 kg/m².    Rationale for Adjustment: Agent is renally eliminated.    Pharmacy will continue to monitor renal function and adjust dose as necessary.      Please call with any questions.    Daniel Morris, [facility] ext [phone]      
   Nutrition Note       Nutrition Assessment:  Assessing patient for LOS. Admitted with gangrene of left foot. S/p incision and drainage on the LLE. Angioplasty of the LLE performed 9/26. Wound healing supplement ordered BID. Intake adequate, >50-75% of all meals. Likely meeting energy/protein needs. Weight stable PTA. All appropriate nutrition interventions in place. Will follow up. Awaiting SNF placement.     Current Nutrition Therapies:    ADULT DIET; Regular  ADULT ORAL NUTRITION SUPPLEMENT; Breakfast, Dinner; Wound Healing Oral Supplement    Anthropometrics:   Current Height: 195.6 cm (6' 5\")  Current Weight - Scale: 113.3 kg (249 lb 12.5 oz)      Monitoring and Evaluation:  No nutrition diagnosis. Patient will be monitored per nutrition standards of care.     Consult Dietitian if nutrition intervention essential to patient care is needed.     Discharge Planning:  No needs    Ashly Burns RD  Zan:  [phone]    
  Physician Progress Note      PATIENT:               MARIAH REAL  CSN #:                  [id number]  :                       1950  ADMIT DATE:       2024 3:53 PM  DISCH DATE:  RESPONDING  PROVIDER #:        Patrick Chaney MD          QUERY TEXT:    Patient admitted with diabetic foot infection with gangrene.  Per attending   H&P: Diabetic gangrene of left foot.  Per  Podiatry listed under x-ray   findings: Status post transmetatarsal amputation. Soft tissue swelling at the   stump.  Soft tissue gas at the stump could represent ulceration or soft tissue   infection.  If possible, please document in progress notes and discharge   summary further specificity regarding gangrene as follows:    The medical record reflects the following:  Risk Factors:  74-year-old male past medical history of type 2 diabetes,   peripheral neuropathy, chronic foot ulcers, osteomyelitis, bilateral midfoot   amputation  acute OM, hypertension, hyperlipidemia, chronic hepatitis   C, and gastric sleeve   Clinical Indicators: Per  Podiatry: Status post transmetatarsal   amputation. Soft tissue swelling at the stump.  Soft tissue gas at the stump   could represent ulceration or soft tissue infection  Treatment: I&D, excisional debridement with  bone bx by Podiatry  Options provided:  -- Left foot stump infection with Gas gangrene  -- Other - I will add my own diagnosis  -- Disagree - Not applicable / Not valid  -- Disagree - Clinically unable to determine / Unknown  -- Refer to Clinical Documentation Reviewer    PROVIDER RESPONSE TEXT:    This patient has left foot stump infection with gas gangrene.    Query created by: Isaias Jackson on 2024 5:00 PM      Electronically signed by:  Patrick Chaney MD 2024 1:48 PM          
4 Eyes Admission Assessment     I agree as the admission nurse that 2 RN's have performed a thorough Head to Toe Skin Assessment on the patient. ALL assessment sites listed below have been assessed on admission.       Areas assessed by both nurses: jarod and jorge  [x]   Head, Face, and Ears   [x]   Shoulders, Back, and Chest  [x]   Arms, Elbows, and Hands   [x]   Coccyx, Sacrum, and Ischum  [x]   Legs, Feet, and Heels    Bilat foot ulcers   Abrasion on L hip        Does the Patient have Skin Breakdown?  Yes a wound was noted on the Admission Assessment and an LDA was Initiated documentation include the Alis-wound, Wound Assessment, Measurements, Dressing Treatment, Drainage, and Color\",         Christopher Prevention initiated:  Yes   Wound Care Orders initiated:  NA, podiatry following       WOC nurse consulted for Pressure Injury (Stage 3,4, Unstageable, DTI, NWPT, and Complex wounds):  No ,     Nurse 1 eSignature: Electronically signed by Jarod Miguel RN on 9/25/24 at 6:46 PM EDT    **SHARE this note so that the co-signing nurse is able to place an eSignature**    Nurse 2 eSignature: Electronically signed by Jorge Stanley RN on 9/25/24 at 6:51 PM EDT            
4 Eyes Skin Assessment     NAME:  Dominick Aguayo  YOB: 1950  MEDICAL RECORD NUMBER:  [id number]    The patient is being assessed for  Admission    I agree that at least one RN has performed a thorough Head to Toe Skin Assessment on the patient. ALL assessment sites listed below have been assessed.      Areas assessed by both nurses:    Head, Face, Ears, Shoulders, Back, Chest, Arms, Elbows, Hands, Sacrum. Buttock, Coccyx, Ischium, Legs. Feet and Heels, and Under Medical Devices         Does the Patient have a Wound? Yes wound(s) were present on assessment. LDA wound assessment was Initiated and completed by RN    Surgical wound to both feet and covered with gauze.        Christopher Prevention initiated by RN: No  Wound Care Orders initiated by RN: No    Pressure Injury (Stage 3,4, Unstageable, DTI, NWPT, and Complex wounds) if present, place Wound referral order by RN under : No    New Ostomies, if present place, Ostomy referral order under : No     Nurse 1 eSignature: Electronically signed by SUGEY PRITCHETT RN on 9/22/24 at 1:10 AM EDT    **SHARE this note so that the co-signing nurse can place an eSignature**    Nurse 2 eSignature: Electronically signed by Maria Eugenia Solis RN on 9/22/24 at 1:50 AM EDT   
Clinical Pharmacy Consult Note  Medication History     Admit Date: 9/21/2024      List of of current medications patient is taking is complete. Home Medication list in EPIC updated to reflect changes noted below.    Source of information: I reviewed the patient's dispense report and interviewed him.    Patient's home pharmacy: [id number] [place] [id number] - P [phone] - F [phone]      Changes made to medication list:   Medications removed: (include reason, ex: therapy completed, patient no longer taking, etc.)  Doxepin  Medications added:   Silver Sulfadiazine  Bactrim DS  Medication doses adjusted:   None  Other notes:   Bactrim DS: 7/10/2024 to 10/10/2024 (90 day supply)    Current Outpatient Medications   Medication Instructions    atenolol-chlorthalidone (TENORETIC 50) 50-25 MG per tablet 1 tablet, Oral, DAILY    omeprazole (PRILOSEC) 40 mg, Oral, DAILY BEFORE BREAKFAST    silver sulfADIAZINE (SILVADENE) 1 % cream APPLY 0.5 GRAMS TO THE AFFECTED AREA DAILY (FEET ULCERS).    sulfamethoxazole-trimethoprim (BACTRIM DS;SEPTRA DS) 800-160 MG per tablet 1 tablet, Oral, 2 TIMES DAILY       Please call with any questions.    Meme Anderson, Pharmacy Intern    
Dr. Green has placed a referral order for pharmacist to manage Outpatient Parental Antimicrobial Therapy (OPAT) pursuant the ID Collaborative Practice Agreement.     Pertinent PMH and HPI:  Patient presented with SOB, headache, fever, chills and leg pain     PMH: Type 2 DM, PAD, HTN, HLD, hx of peripheral neuropathy, chronic hepatitis C, OM  HPI: Has chronic bilateral foot ulcers with bilateral midfoot amputations (2016 RF, 2017 LF). He was prescribed Bactrim in August by his podiatrist for chronic foot infection presents this admission reporting worsening of foot infection.      Pertinent Objective Data:    Wt Readings from Last 1 Encounters:   10/01/24 110.5 kg (243 lb 9.7 oz)      BMI Readings from Last 1 Encounters:   10/01/24 28.89 kg/m²      Serum creatinine: 1 mg/dL 10/01/24 0615  Estimated creatinine clearance: 88 mL/min    Lab Results   Component Value Date    .9 (H) 09/24/2024       Lab Results   Component Value Date    SEDRATE 96 (H) 09/24/2024       Imaging:   XR Left Foot: Status post transmetatarsal amputation. Soft tissue swelling and gas seen at the stump, particularly at the medial aspect may represent gas forming soft tissue infection or ulceration. Slight cortical irregularity, cannot exclude osteomyelitis.    MRI Left Foot:   1.  Dorsal lateral forefoot soft tissue ulceration with osteomyelitis of the  fifth metatarsal base remnant.  2.  Plantar lateral soft tissue ulceration tracking medially to an 8 mm focus of  susceptibility along the plantar fascia, possible foreign body. This appears to  correspond to a rounded area of lucency on prior radiograph.     Chest XR: No evidence for acute cardiopulmonary disease    Angiography of lower extremities: Three vessel runoff to the left foot with focal stenoses of the proximal posterior tibial artery, proximal and mid anterior tibial artery. There was good response to balloon angioplasty as described in the report.                
ID     Was called about evolving rash suspicious for drug reaction  Currently on Unasyn for severe infection L TMA stump infection / SSI  S/p debridement 9/24, 9/27, 9/30  Polymicrobial - Prevotella, Peptoniphilus, S viridans, amp-S E faecalis   Plan was 6 weeks IV Unasyn in SNF    No eosinophilia or fever   Prior history of allergy to clinda     Today, 9/28/24          Agree this looks like a drug rash and the beta-lactam would be a primary concern    -change to dapto + flagyl   -baseline CK   -hold lipitor   -ALESIA updated   -d/w Medicine team     CIPRIANO BURNS MD        
ID Follow-up NOTE    CC:   Complicated L foot infection   Antibiotics: Daptomycin, Zosyn    Admit Date: 9/21/2024  Hospital Day: 5    Subjective:     Patient reports NO foot pain  No complaint    Objective:     Patient Vitals for the past 8 hrs:   BP Temp Temp src Pulse Resp SpO2 Weight   09/25/24 0753 114/60 98.2 °F (36.8 °C) Oral 72 16 97 % --   09/25/24 0600 -- -- -- -- -- -- 111.1 kg (245 lb)   09/25/24 0349 (!) 116/54 98.6 °F (37 °C) Oral 76 16 95 % --     I/O last 3 completed shifts:  In: 4525.5 [P.O.:720; I.V.:2488.6; IV Piggyback:1316.9]  Out: 4100 [Urine:4100]  No intake/output data recorded.    EXAM:  GENERAL: No apparent distress.  RA  HEENT: Membranes moist, no oral lesion  NECK:  Supple, no lymphadenopathy  LUNGS: Clear b/l, no rales, no dullness  CARDIAC: RRR, no murmur appreciated  ABD:  + BS, soft / NT  EXT:  L foot with VAC, dressing / ACE  NEURO: No focal neurologic findings  PSYCH: Orientation, sensorium, mood normal  LINES:  Peripheral iv       Data Review:  Lab Results   Component Value Date    WBC 5.3 09/25/2024    HGB 13.0 (L) 09/25/2024    HCT 39.2 (L) 09/25/2024    MCV 93.1 09/25/2024     09/25/2024     Lab Results   Component Value Date    CREATININE 0.9 09/25/2024    BUN 13 09/25/2024     (L) 09/25/2024    K 3.8 09/25/2024    CL 97 (L) 09/25/2024    CO2 25 09/25/2024       Hepatic Function Panel:   Lab Results   Component Value Date/Time    ALKPHOS 112 09/24/2024 05:01 AM    ALT 58 09/24/2024 05:01 AM    AST 56 09/24/2024 05:01 AM    BILITOT 0.8 09/24/2024 05:01 AM    BILIDIR 0.3 09/24/2024 05:01 AM    IBILI 0.5 09/24/2024 05:01 AM       9/21 ESR 90,     Micro:  9/8/17     Surgical cx Pseudomonas R cefepime, cipro, meropenem, pip  9/22/17   Wound cx heavy growth Ps aeruginosa, heavy Enterococcus faecalis  PSEUDOMONAS AERUGINOSA      Antibiotic Interpretation [facility] Unit     cefepime Resistant >16 mcg/mL     ciprofloxacin Resistant >2 mcg/mL     gentamicin Sensitive <=4 
ID Follow-up NOTE    CC:   Complicated L foot infection   Antibiotics: Daptomycin, Zosyn    Admit Date: 9/21/2024  Hospital Day: 6    Subjective:     L LE angiogram today    - L ant tib angioplasty   - L post tib angioolasty    Patient reports NO foot pain  No complaint    Objective:     Patient Vitals for the past 8 hrs:   BP Temp Temp src Pulse Resp SpO2   09/26/24 1130 113/65 97.6 °F (36.4 °C) Oral 56 18 96 %   09/26/24 1100 120/71 -- -- 50 -- --   09/26/24 1030 115/64 -- -- 56 -- --   09/26/24 1000 112/67 -- -- 69 -- --   09/26/24 0940 -- -- -- 70 -- --   09/26/24 0930 115/66 -- -- 68 -- --   09/26/24 0915 109/64 -- -- 63 -- --   09/26/24 0900 111/69 -- -- 66 -- --   09/26/24 0848 (!) 117/59 98 °F (36.7 °C) Oral 68 18 93 %     I/O last 3 completed shifts:  In: 6060.8 [P.O.:1110; I.V.:3633.9; IV Piggyback:1316.9]  Out: 4750 [Urine:4650; Drains:100]  I/O this shift:  In: -   Out: 5 [Blood:5]    EXAM:  GENERAL: No apparent distress.  RA  HEENT: Membranes moist, no oral lesion  NECK:  Supple, no lymphadenopathy  LUNGS: Clear b/l, no rales, no dullness  CARDIAC: RRR, no murmur appreciated  ABD:  + BS, soft / NT  EXT:  L foot with VAC, dressing / ACE  NEURO: No focal neurologic findings  PSYCH: Orientation, sensorium, mood normal  LINES: R PICC, placed 9/25       Data Review:  Lab Results   Component Value Date    WBC 6.9 09/26/2024    HGB 13.6 09/26/2024    HCT 42.0 09/26/2024    MCV 93.7 09/26/2024     09/26/2024     Lab Results   Component Value Date    CREATININE 0.9 09/26/2024    BUN 16 09/26/2024     09/26/2024    K 4.0 09/26/2024     09/26/2024    CO2 24 09/26/2024       Hepatic Function Panel:   Lab Results   Component Value Date/Time    ALKPHOS 119 09/26/2024 06:30 AM    ALT 78 09/26/2024 06:30 AM    AST 49 09/26/2024 06:30 AM    BILITOT 0.4 09/26/2024 06:30 AM    BILIDIR 0.2 09/26/2024 06:30 AM    IBILI 0.2 09/26/2024 06:30 AM       9/21 ESR 90,     Micro:  9/8/17     Surgical cx 
ID Follow-up NOTE    CC:   Complicated L foot infection, PAD (angio / angioplasty 9/26), rash 0.27-8   Antibiotics: Zosyn    Admit Date: 9/21/2024  Hospital Day: 11    Subjective:     No rash     Patient reports NO foot pain  No complaint    Objective:     Patient Vitals for the past 8 hrs:   BP Temp Temp src Pulse Resp SpO2 Weight   10/01/24 0749 (!) 159/84 97.9 °F (36.6 °C) Oral 60 16 97 % --   10/01/24 0400 (!) 143/78 97.5 °F (36.4 °C) Oral 58 17 96 % 110.5 kg (243 lb 9.7 oz)     I/O last 3 completed shifts:  In: 1200 [P.O.:1200]  Out: 2650 [Urine:2650]  No intake/output data recorded.    EXAM:  GENERAL: No apparent distress.  RA  HEENT: Membranes moist, no oral lesion  NECK:  Supple, no lymphadenopathy  LUNGS: Clear b/l, no rales, no dullness  CARDIAC: RRR, no murmur appreciated  ABD:  + BS, soft / NT  EXT:  L foot with VAC, dressing / ACE    No rash  NEURO: No focal neurologic findings  PSYCH: Orientation, sensorium, mood normal  LINES: R PICC, placed 9/25       Data Review:  Lab Results   Component Value Date    WBC 6.9 10/01/2024    HGB 12.4 (L) 10/01/2024    HCT 37.4 (L) 10/01/2024    MCV 92.1 10/01/2024     10/01/2024     Lab Results   Component Value Date    CREATININE 1.0 10/01/2024    BUN 21 (H) 10/01/2024     10/01/2024    K 4.1 10/01/2024     10/01/2024    CO2 28 10/01/2024       Hepatic Function Panel:   Lab Results   Component Value Date/Time    ALKPHOS 84 09/29/2024 05:28 AM    ALT 50 09/29/2024 05:28 AM    AST 29 09/29/2024 05:28 AM    BILITOT 0.3 09/29/2024 05:28 AM    BILIDIR 0.2 09/29/2024 05:28 AM    IBILI 0.1 09/29/2024 05:28 AM       9/21 ESR 90,     Micro:  9/8/17     Surgical cx Pseudomonas R cefepime, cipro, meropenem, pip  9/22/17   Wound cx heavy growth Ps aeruginosa, heavy Enterococcus faecalis  PSEUDOMONAS AERUGINOSA      Antibiotic Interpretation [facility] Unit     cefepime Resistant >16 mcg/mL     ciprofloxacin Resistant >2 mcg/mL     gentamicin Sensitive <=4 
Lab called. Pt did not have enough blood for ordered lab tests. Lab will recollect.    Electronically signed by Harpreet Hernandez RN on 9/24/2024 at 6:27 AM    
Notified Dr Chaney at bedside of patients rash this nurse noted.   
Occupational Therapy  Facility/Department: [address]  Occupational Therapy Initial Assessment    Name: Dominick Aguayo  : 1950  MRN: [id number]  Date of Service: 2024    Discharge Recommendations:  IP Rehab  OT Equipment Recommendations  Equipment Needed: Yes  Other: Knee scooter       Patient Diagnosis(es): The primary encounter diagnosis was Ulcer of left foot, unspecified ulcer stage (HCC). Diagnoses of Necrotizing soft tissue infection, Gangrene associated with diabetes mellitus (HCC), and Osteomyelitis, unspecified site, unspecified type were also pertinent to this visit.  Past Medical History:  has a past medical history of Amputation of foot (HCC), Chest pain, Chronic GERD, ESBL (extended spectrum beta-lactamase) producing bacteria infection, Gastroesophageal reflux disease without esophagitis, H/O gastric bypass, Hepatitis C virus, Hx of hepatitis C, Hyperlipidemia, Hypertension, Hypertension, Hypertension, essential, MDRO (multiple drug resistant organisms) resistance, MDRO (multiple drug resistant organisms) resistance, Morbid obesity, Non-pressure chronic ulcer of other part of left foot limited to breakdown of skin (HCC), Non-pressure chronic ulcer of other part of right foot limited to breakdown of skin (HCC), Obstructive sleep apnea, Partial nontraumatic amputation of left foot (HCC), Peripheral neuropathy, and Pre-operative clearance.  Past Surgical History:  has a past surgical history that includes Foot surgery (Bilateral, 10/8/2015); Tonsillectomy; other surgical history (2016); Foot surgery (Right, 2016); other surgical history (Left, 2017); Foot surgery (Left, 2017); other surgical history (Left, 2017); other surgical history (Left, 2017); Upper gastrointestinal endoscopy (N/A, 2021); Sleeve Gastrectomy (N/A, 10/5/2021); and Foot Debridement (Left, 2024).    Treatment Diagnosis: decreased ADLs and fxl mobility      Assessment  Performance 
Occupational Therapy  Facility/Department: [address]  Occupational Therapy Initial Assessment/Treatment    Name: Dominick Aguayo  : 1950  MRN: [id number]  Date of Service: 2024    Discharge Recommendations:  IP Rehab  OT Equipment Recommendations  Equipment Needed: Yes  Mobility Devices: Defer to next level of care, anticipate the need for a Knee Scooter    At baseline this patient was independent with functional mobility & ADL's. The current hospitalization has resulted in the patient now being limited with all aspects of mobility, negatively impacting the patient's functional independence, ability to safely access their home environment, and ability to complete valued daily tasks and life roles. Dominick Aguayo is motivated for recovery and demonstrates the ability to tolerate inpatient rehabilitation 3 hours/day, 5 days/week for optimal return to functional independence, quality of life, and decreased caregiver burden.      Patient Diagnosis(es): The primary encounter diagnosis was Ulcer of left foot, unspecified ulcer stage ([facility]). Diagnoses of Necrotizing soft tissue infection and Gangrene associated with diabetes mellitus ([facility]) were also pertinent to this visit.  Past Medical History:  has a past medical history of Amputation of foot ([facility]), Chest pain, Chronic GERD, ESBL (extended spectrum beta-lactamase) producing bacteria infection, Gastroesophageal reflux disease without esophagitis, H/O gastric bypass, Hepatitis C virus, Hx of hepatitis C, Hyperlipidemia, Hypertension, Hypertension, Hypertension, essential, MDRO (multiple drug resistant organisms) resistance, MDRO (multiple drug resistant organisms) resistance, Morbid obesity ([facility]), Non-pressure chronic ulcer of other part of left foot limited to breakdown of skin ([facility]), Non-pressure chronic ulcer of other part of right foot limited to breakdown of skin ([facility]), Obstructive sleep apnea, Partial nontraumatic amputation of left foot 
Occupational Therapy  Facility/Department: [address]  Occupational Therapy Treatment    Name: Dominick Aguayo  : 1950  MRN: [id number]  Date of Service: 2024    Discharge Recommendations:  IP Rehab  OT Equipment Recommendations  Equipment Needed: Yes  Mobility Devices: Wheelchair;Walker  Other: Knee scooter       Patient Diagnosis(es): The primary encounter diagnosis was Ulcer of left foot, unspecified ulcer stage (HCC). Diagnoses of Necrotizing soft tissue infection, Gangrene associated with diabetes mellitus (HCC), and Osteomyelitis, unspecified site, unspecified type were also pertinent to this visit.  Past Medical History:  has a past medical history of Amputation of foot (HCC), Chest pain, Chronic GERD, ESBL (extended spectrum beta-lactamase) producing bacteria infection, Gastroesophageal reflux disease without esophagitis, H/O gastric bypass, Hepatitis C virus, Hx of hepatitis C, Hyperlipidemia, Hypertension, Hypertension, Hypertension, essential, MDRO (multiple drug resistant organisms) resistance, MDRO (multiple drug resistant organisms) resistance, Morbid obesity, Non-pressure chronic ulcer of other part of left foot limited to breakdown of skin (HCC), Non-pressure chronic ulcer of other part of right foot limited to breakdown of skin (HCC), Obstructive sleep apnea, Partial nontraumatic amputation of left foot (HCC), Peripheral neuropathy, and Pre-operative clearance.  Past Surgical History:  has a past surgical history that includes Foot surgery (Bilateral, 10/8/2015); Tonsillectomy; other surgical history (2016); Foot surgery (Right, 2016); other surgical history (Left, 2017); Foot surgery (Left, 2017); other surgical history (Left, 2017); other surgical history (Left, 2017); Upper gastrointestinal endoscopy (N/A, 2021); Sleeve Gastrectomy (N/A, 10/5/2021); and Foot Debridement (Left, 2024).    Treatment Diagnosis: decreased ADLs and fxl 
Occupational Therapy  HOLD     Chart Reviewed. RN consulted. RN recommending hold therapy until tomorrow. Pt returning from Abdominal aortogram and left lower extremity arteriogram. Continue OT per POC.     Eva CAPONE/ZOILA  
Patient alert and oriented x4. Patient denies any pain or nausea. Dressing to both feet dci. Elevated heels via pillows. Administrating iv NS and abx per order. No AE of abx noted. Assessment done.see flowsheet. Patient in bed lowest position call light and bedside table within reach. All needs are met at this time. Patient aware to call if any help needed.Plan of care ongoing. BP (!) 119/58   Pulse 61   Temp 97.7 °F (36.5 °C) (Oral)   Resp 16   Ht 1.956 m (6' 5\")   Wt 110.5 kg (243 lb 11.2 oz)   SpO2 97%   BMI 28.90 kg/m²       
Patient alert and oriented x4. Patient denies any pain or nausea. Dressing to both legs cdi. Assessment done.see flowsheet. Patient in bed lowest position call light and bedside table within reach. All needs are met at this time. Patient aware to call if any help needed.Plan of care ongoing./64   Pulse 67   Temp 98.7 °F (37.1 °C) (Oral)   Resp 16   Ht 1.956 m (6' 5\")   Wt 110.5 kg (243 lb 11.2 oz)   SpO2 97%   BMI 28.90 kg/m²    
Patient discharged from room 4308 and left via lyft to facility. AVS reviewed with patient, all questions asked and answered at this time. PIV and telemetry removed from patient. All belongings sent with patient/family.       Electronically signed by Toshia Soni RN on 10/1/2024 at 4:32 PM    
Patient just came from ED with a bag of Vancomycin. It was scheduled @1700. Dr. Gloria aware.  
Patient transferred to room 5306 from ED. Patient is A&O x 4. VSS. Patient oriented to the room all safety measures in place. Patient is on Tele monitor box spare 1.. Admission orders released and patient 4 eyes completed. Admission documentation completed. No other needs are noted at this time.    [x] Bed alarm on and cord plugged into wall  [x] Bed in lowest position  [x] Call light and bedside table within reach  [x] Patient educated on all safety measures  []Oxygen connected to wall (if applicable)     Nurse 1 Esignature: Electronically signed by SUGEY PRITCHETT RN on 9/22/24 at 1:11 AM EDT  Nurse 2 Esignature: Electronically signed by Maria Eugenia Solis RN on 9/22/24 at 1:49 AM EDT   
Physical Therapy  Facility/Department: [address]  Physical Therapy Initial Assessment and Treatment    Name: Dominick Aguayo  : 1950  MRN: [id number]  Date of Service: 2024    Discharge Recommendations:  IP Rehab, Patient able to tolerate 3 hours of therapy per day   PT Equipment Recommendations  Other: Defer      Patient Diagnosis(es): The primary encounter diagnosis was Ulcer of left foot, unspecified ulcer stage (HCC). Diagnoses of Necrotizing soft tissue infection and Gangrene associated with diabetes mellitus (HCC) were also pertinent to this visit.  Past Medical History:  has a past medical history of Amputation of foot (HCC), Chest pain, Chronic GERD, ESBL (extended spectrum beta-lactamase) producing bacteria infection, Gastroesophageal reflux disease without esophagitis, H/O gastric bypass, Hepatitis C virus, Hx of hepatitis C, Hyperlipidemia, Hypertension, Hypertension, Hypertension, essential, MDRO (multiple drug resistant organisms) resistance, MDRO (multiple drug resistant organisms) resistance, Morbid obesity (HCC), Non-pressure chronic ulcer of other part of left foot limited to breakdown of skin (HCC), Non-pressure chronic ulcer of other part of right foot limited to breakdown of skin (HCC), Obstructive sleep apnea, Partial nontraumatic amputation of left foot (HCC), Peripheral neuropathy, and Pre-operative clearance.  Past Surgical History:  has a past surgical history that includes Foot surgery (Bilateral, 10/8/2015); Tonsillectomy; other surgical history (2016); Foot surgery (Right, 2016); other surgical history (Left, 2017); Foot surgery (Left, 2017); other surgical history (Left, 2017); other surgical history (Left, 2017); Upper gastrointestinal endoscopy (N/A, 2021); Sleeve Gastrectomy (N/A, 10/5/2021); and Foot Debridement (Left, 2024).    Assessment  Assessment: Pt is 75 yo M admitted on 24 with gangrene L foot.  Pt 
Physical Therapy  [address]  Physical Therapy treatment note    Name: Dominick Aguayo  : 1950  MRN: [id number]  Date of Service: 2024    Discharge Recommendations:  Subacute/Skilled Nursing Facility, 24 hour supervision or assist (SNF v 24 hours)   PT Equipment Recommendations  Equipment Needed:  (will need knee scooter for home if d/c  home)      Patient Diagnosis(es): The primary encounter diagnosis was Ulcer of left foot, unspecified ulcer stage (HCC). Diagnoses of Necrotizing soft tissue infection, Gangrene associated with diabetes mellitus (HCC), and Osteomyelitis, unspecified site, unspecified type were also pertinent to this visit.  Past Medical History:  has a past medical history of Amputation of foot (HCC), Chest pain, Chronic GERD, ESBL (extended spectrum beta-lactamase) producing bacteria infection, Gastroesophageal reflux disease without esophagitis, H/O gastric bypass, Hepatitis C virus, Hx of hepatitis C, Hyperlipidemia, Hypertension, Hypertension, Hypertension, essential, MDRO (multiple drug resistant organisms) resistance, MDRO (multiple drug resistant organisms) resistance, Morbid obesity, Non-pressure chronic ulcer of other part of left foot limited to breakdown of skin (HCC), Non-pressure chronic ulcer of other part of right foot limited to breakdown of skin (HCC), Obstructive sleep apnea, Partial nontraumatic amputation of left foot (HCC), Peripheral neuropathy, and Pre-operative clearance.  Past Surgical History:  has a past surgical history that includes Foot surgery (Bilateral, 10/8/2015); Tonsillectomy; other surgical history (2016); Foot surgery (Right, 2016); other surgical history (Left, 2017); Foot surgery (Left, 2017); other surgical history (Left, 2017); other surgical history (Left, 2017); Upper gastrointestinal endoscopy (N/A, 2021); Sleeve Gastrectomy (N/A, 10/5/2021); and Foot Debridement (Left, 
Physical Therapy/Occupational Therapy  Discharge note    Referral received, chart reviewed. Pt declined PT and OT services today as he is pending OR this am. Please re-refer pt post op when he is ready for therapy. RN aware.    Shayy Miner, PT   Neha Maddox, OTR/L [id number]        
Podiatric Surgery Daily Progress Note  Dominick Aguayo      Subjective :   Patient seen and examined this am at the bedside. Patient denies any acute overnight events.  Dressing to be/L LE intact without any overt signs of external strikethrough.  Patient states that he has not felt febrile since coming to the ED.  Patient been tolerating IV antibiotics well without any issues.  Patient understands weightbearing restrictions has been compliant at this time. patient denies N/V/F/C/SOB. Patient denies calf pain, thigh pain, chest pain.     Review of Systems: A 12 point review of symptoms is unremarkable with the exception of the chief complaint. Patient specifically denies nausea, fever, vomiting, chills, shortness of breath, chest pain, abdominal pain, constipation or difficulty urinating.       Objective     /62   Pulse 61   Temp 98.1 °F (36.7 °C) (Oral)   Resp 14   Ht 1.956 m (6' 5\")   Wt 110.5 kg (243 lb 11.2 oz)   SpO2 95%   BMI 28.90 kg/m²      I/O:  Intake/Output Summary (Last 24 hours) at 9/22/2024 0847  Last data filed at 9/22/2024 0815  Gross per 24 hour   Intake 1650 ml   Output 775 ml   Net 875 ml              Wt Readings from Last 3 Encounters:   09/21/24 110.5 kg (243 lb 11.2 oz)   03/12/24 113.9 kg (251 lb)   02/15/23 109.3 kg (241 lb)       LABS:    Recent Labs     09/21/24  1629 09/22/24  0431   WBC 12.2* 8.1   HGB 14.3 12.9*   HCT 43.1 38.9*    113*        Recent Labs     09/22/24  0431   *   K 3.4*   CL 98*   CO2 22   PHOS 3.2   BUN 33*   CREATININE 1.3      No results for input(s): \"INR\", \"APTT\" in the last 72 hours.    Invalid input(s): \"PROT\"        LOWER EXTREMITY EXAMINATION    Dressing to be/LE intact. No strikethrough noted to the external dressing.  Serosanguineous drainage noted to the internal layers of the dressing.     VASCULAR: DP and PT pulses faintly palpable 1/2.  Upon prior Doppler U/S DP/PT pulses biphasic B/L.  CFT is brisk to the digits of the foot b/l. 
Podiatric Surgery Daily Progress Note  Dominick Aguayo      Subjective :   Patient seen and examined this am at the bedside. Patient denies any acute overnight events.  Dressing to be/L LE intact without any overt signs of external strikethrough.  Patient understands surgical necessity and is amenable to the current plan. patient denies N/V/F/C/SOB. Patient denies calf pain, thigh pain, chest pain.     Review of Systems: A 12 point review of symptoms is unremarkable with the exception of the chief complaint. Patient specifically denies nausea, fever, vomiting, chills, shortness of breath, chest pain, abdominal pain, constipation or difficulty urinating.       Objective     BP (!) 119/58   Pulse 61   Temp 97.7 °F (36.5 °C) (Oral)   Resp 16   Ht 1.956 m (6' 5\")   Wt 110.5 kg (243 lb 11.2 oz)   SpO2 97%   BMI 28.90 kg/m²      I/O:  Intake/Output Summary (Last 24 hours) at 9/23/2024 0551  Last data filed at 9/23/2024 0325  Gross per 24 hour   Intake 960 ml   Output 2875 ml   Net -1915 ml              Wt Readings from Last 3 Encounters:   09/21/24 110.5 kg (243 lb 11.2 oz)   03/12/24 113.9 kg (251 lb)   02/15/23 109.3 kg (241 lb)       LABS:    Recent Labs     09/21/24  1629 09/22/24  0431   WBC 12.2* 8.1   HGB 14.3 12.9*   HCT 43.1 38.9*    113*        Recent Labs     09/22/24  0431   *   K 3.4*   CL 98*   CO2 22   PHOS 3.2   BUN 33*   CREATININE 1.3      No results for input(s): \"INR\", \"APTT\" in the last 72 hours.    Invalid input(s): \"PROT\"        LOWER EXTREMITY EXAMINATION    Dressing to be/LE intact. No strikethrough noted to the external dressing.  Serosanguineous drainage noted to the internal layers of the dressing.     VASCULAR: DP and PT pulses faintly palpable 1/2.  Upon prior Doppler U/S DP/PT pulses biphasic B/L.  CFT is brisk to the digits of the foot b/l. Skin temperature is warm to cool from proximal to distal with no focal calor noted. No edema noted. No pain with calf compression 
Podiatric Surgery Daily Progress Note  Dominick Aguayo      Subjective :   Patient seen and examined this am at the bedside. Patient denies any pain to his feet. Patient denies any acute overnight events.     Patient denies N/V/F/C/SOB. Patient denies calf pain, thigh pain, chest pain.        Objective     /74   Pulse 56   Temp 97.7 °F (36.5 °C) (Oral)   Resp 16   Ht 1.956 m (6' 5\")   Wt 115.5 kg (254 lb 10.1 oz)   SpO2 97%   BMI 30.19 kg/m²      I/O:  Intake/Output Summary (Last 24 hours) at 10/1/2024 0545  Last data filed at 9/30/2024 2300  Gross per 24 hour   Intake 1080 ml   Output 2000 ml   Net -920 ml              Wt Readings from Last 3 Encounters:   09/30/24 115.5 kg (254 lb 10.1 oz)   03/12/24 113.9 kg (251 lb)   02/15/23 109.3 kg (241 lb)       LABS:    Recent Labs     09/29/24  0528 09/30/24  0637   WBC 7.3 8.3   HGB 12.1* 12.5*   HCT 36.5* 37.6*    255        Recent Labs     09/30/24  0637      K 4.0      CO2 28   PHOS 3.2   BUN 19   CREATININE 0.9        No results for input(s): \"INR\", \"APTT\" in the last 72 hours.    Invalid input(s): \"PROT\"    LOWER EXTREMITY EXAMINATION    Dressing to both LE intact. No strikethrough noted to the external dressing. No drainage noted to the internal layers of the dressing. Wound vac in place to left foot.     VASCULAR: DP and PT pulses faintly palpable 1/2 b/l. Upon prior Doppler U/S DP/PT pulses multiphasic Right, Monophasic left. CFT is brisk to the TMA stumps of the foot b/l. Skin temperature is warm to cool from proximal to distal with no focal calor noted. No edema noted. No pain with calf compression b/     NEUROLOGIC: Gross and epicritic sensation is absent b/l. Protective sensation is absent at all pedal sites b/l.    DERMATOLOGIC:  Right lower extremity:  Chronic full thickness ulcer of first metatarsal along prior TMA site. Base is mostly superficial dermal tissue where hyperkeratotic periwound. No malodor, no drainage, no 
Podiatric Surgery Daily Progress Note  Dominick Aguayo      Subjective :   Patient seen and examined this am at the bedside. Patient denies any pain to his feet. Patient denies any acute overnight events.     Patient denies N/V/F/C/SOB. Patient denies calf pain, thigh pain, chest pain.        Objective     BP (!) 164/77   Pulse 55   Temp 97.5 °F (36.4 °C) (Oral)   Resp 17   Ht 1.956 m (6' 5\")   Wt 114 kg (251 lb 5.2 oz)   SpO2 97%   BMI 29.80 kg/m²      I/O:  Intake/Output Summary (Last 24 hours) at 9/30/2024 0546  Last data filed at 9/29/2024 2330  Gross per 24 hour   Intake 810 ml   Output 1300 ml   Net -490 ml              Wt Readings from Last 3 Encounters:   09/29/24 114 kg (251 lb 5.2 oz)   03/12/24 113.9 kg (251 lb)   02/15/23 109.3 kg (241 lb)       LABS:    Recent Labs     09/28/24  0618 09/29/24  0528   WBC 6.5 7.3   HGB 12.3* 12.1*   HCT 36.8* 36.5*    250        Recent Labs     09/29/24  0528      K 4.2      CO2 29   PHOS 3.2   BUN 17   CREATININE 0.8        No results for input(s): \"INR\", \"APTT\" in the last 72 hours.    Invalid input(s): \"PROT\"    WOUND VAC APPLICATION:  At this time a piece of non-adherent dressing was placed over the incision site on the left foot. Next, a [employer]  Wound Vac was placed over the patient's incision site using the manufacturers instructions. Tegaderm was used to cover and protect the surrounding soft tissues from maceration. The black foam was cut to size and placed over the incision site. This was then covered with clear tegaderm. Next, the wound vac was connected to the suction device at a rate of 125mmHg with no leaks noted and great negative pressure noted. The patient tolerated the procedure well.      LOWER EXTREMITY EXAMINATION    Dressing to both LE intact. No strikethrough noted to the external dressing. No drainage noted to the internal layers of the dressing. Wound vac changed today.    VASCULAR: DP and PT pulses faintly palpable 1/2 b/l. 
Podiatric Surgery Daily Progress Note  Dominick Aguayo      Subjective :   Patient seen and examined this pm at the bedside.  Patient resting comfortably with no visible complaints and no voiced complaints.  Patient denies any pain to B/L LE. patient denies N/V/F/C/SOB. Patient denies calf pain, thigh pain, chest pain.     Review of Systems: A 12 point review of symptoms is unremarkable with the exception of the chief complaint. Patient specifically denies nausea, fever, vomiting, chills, shortness of breath, chest pain, abdominal pain, constipation or difficulty urinating.       Objective     /78   Pulse 67   Temp 98.1 °F (36.7 °C) (Oral)   Resp 18   Ht 1.956 m (6' 5\")   Wt 111 kg (244 lb 11.4 oz)   SpO2 95%   BMI 29.02 kg/m²      I/O:  Intake/Output Summary (Last 24 hours) at 9/27/2024 0926  Last data filed at 9/27/2024 0904  Gross per 24 hour   Intake 120 ml   Output 2700 ml   Net -2580 ml              Wt Readings from Last 3 Encounters:   09/27/24 111 kg (244 lb 11.4 oz)   03/12/24 113.9 kg (251 lb)   02/15/23 109.3 kg (241 lb)       LABS:    Recent Labs     09/26/24  0630 09/27/24  0623   WBC 6.9 6.5   HGB 13.6 12.8*   HCT 42.0 38.4*    234        Recent Labs     09/27/24  0623      K 4.1      CO2 28   PHOS 2.9   BUN 15   CREATININE 0.9        Recent Labs     09/26/24  0629   INR 1.24*             LOWER EXTREMITY EXAMINATION     Dressing to be/LE intact. No strikethrough noted to the external dressing.  Serosanguineous drainage noted to the internal layers of the dressing.      VASCULAR: DP and PT pulses faintly palpable 1/2 b/l.  Upon prior Doppler U/S DP/PT pulses multiphasic Right, Monophasic left.  CFT is brisk to the TMA stumps of the foot b/l. Skin temperature is warm to cool from proximal to distal with no focal calor noted. No edema noted. No pain with calf compression b/l.     NEUROLOGIC: Gross and epicritic sensation is absent b/l. Protective sensation is absent at all 
Progress Note    Admit Date: 9/21/2024  Day: 3  Diet: ADULT DIET; Regular; 4 carb choices (60 gm/meal)    CC:  Shortness of Breath and Headache     Interval history:   Formal washout performed this morning in the OR (9/23). Patient was seen briefly by Dr. Egan this morning before moving to the OR. Returned to patient's room this afternoon ~2:30pm. Patient reports he is feeling well after his procedure and does not have any acute complaints. He denies SOB, chest pain, palpitations, chest tightness, and abdominal pain.    HPI:   Dominick Aguayo is a 74 y.o. male with PMHx significant for DM2, HTN, HLD, chronic HCV, and bilateral midfoot amputation (2017) 2/2 OM who presents to the ED with a 6-day history of malaise and headache. Since February, he had been taking Bactrim per his podiatrist. Despite taking the meds, he began feeling symptomatic with SOB and malaise on Monday 9/16. His dyspnea resolved before presenting to the ED on 9/21. He denied fevers and chills at presentation.     ED Course: Labs significant for elevated CRP and sed rate.  Left foot x-ray concerning for gas formation.  Started empirically on cefepime, vancomycin, and clindamycin.  Podiatry consulted for I&D before transfer to floor.    Medications:     Scheduled Meds:   vancomycin  1,000 mg IntraVENous Q12H    [Held by provider] enoxaparin  30 mg SubCUTAneous BID    cefepime  2,000 mg IntraVENous Q8H    doxepin  10 mg Oral Nightly    pantoprazole  40 mg Oral QAM AC    sodium chloride flush  5-40 mL IntraVENous 2 times per day    atenolol  50 mg Oral Daily    [Held by provider] chlorthalidone  25 mg Oral Daily    clindamycin (CLEOCIN) IV  900 mg IntraVENous Q8H    insulin lispro  0-4 Units SubCUTAneous TID WC    insulin lispro  0-4 Units SubCUTAneous Nightly     Continuous Infusions:   sodium chloride 5 mL/hr at 09/22/24 0029    dextrose      sodium chloride 100 mL/hr at 09/23/24 0601     PRN Meds:sodium chloride flush, sodium chloride, ondansetron 
Progress Note    Admit Date: 9/21/2024  Day: 4  Diet: ADULT DIET; Regular  ADULT ORAL NUTRITION SUPPLEMENT; Breakfast, Dinner; Wound Healing Oral Supplement    CC:  Shortness of Breath and Headache     Interval history:   Afebrile and HDS. Developed rash on his back that has spread/worsened over the past ~12 hours. Also has separate areas of rash on his chest..     HPI:   \"Dominick Aguayo is a 74 y.o. 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 Since February, he had been taking Bactrim per his podiatrist. Despite taking the meds, he began feeling symptomatic with SOB and malaise on Monday 9/16. His dyspnea resolved before presenting to the ED on 9/21. He denied fevers and chills at presentation.\"       Medications:     Scheduled Meds:   ampicillin-sulbactam  3,000 mg IntraVENous Q6H    aspirin  325 mg Oral Daily    atorvastatin  40 mg Oral Nightly    sodium chloride flush  5-40 mL IntraVENous 2 times per day    enoxaparin  30 mg SubCUTAneous BID    doxepin  10 mg Oral Nightly    pantoprazole  40 mg Oral QAM AC    sodium chloride flush  5-40 mL IntraVENous 2 times per day    atenolol  50 mg Oral Daily    [Held by provider] chlorthalidone  25 mg Oral Daily    insulin lispro  0-4 Units SubCUTAneous TID WC    insulin lispro  0-4 Units SubCUTAneous Nightly     Continuous Infusions:   sodium chloride 100 mL/hr at 09/28/24 0351    lactated ringers IV soln 75 mL/hr at 09/27/24 2100    sodium chloride Stopped (09/23/24 0606)    dextrose       PRN Meds:sodium chloride flush, sodium chloride, sodium chloride flush, sodium chloride, ondansetron **OR** ondansetron, polyethylene glycol, acetaminophen **OR** acetaminophen, glucose, dextrose bolus **OR** dextrose bolus, glucagon (rDNA), dextrose    Objective:   Vitals:   T-max:  Patient Vitals for the past 8 hrs:   BP Temp Temp src 
Progress Note    Admit Date: 9/21/2024  Day: 4  Diet: ADULT DIET; Regular  ADULT ORAL NUTRITION SUPPLEMENT; Breakfast, Dinner; Wound Healing Oral Supplement    CC:  Shortness of Breath and Headache     Interval history:   Patient evaluated at bedside this morning.  Patient reported that he is doing well.  Vitals reviewed, stable this morning.  Vital stable.  Antibiotics changed from daptomycin and Flagyl to Zosyn by infectious disease.  Will follow patient closely, to evaluate for recurrence of rash.  Lipitor 40 Mg started today as patient no longer on daptomycin.  [facility] consulted for evaluation for suitability.    Patient awaiting disposition.  Concerned about cost of antibiotics and discussing feasible choices with case management.    HPI:   Dominick Aguayo is a 74 y.o. 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 Since February, he had been taking Bactrim per his podiatrist. Despite taking the meds, he began feeling symptomatic with SOB and malaise on Monday 9/16. His dyspnea resolved before presenting to the ED on 9/21. He denied fevers and chills at presentation.     ED Course: Labs significant for elevated CRP and sed rate.  Left foot x-ray concerning for gas formation.  Started empirically on cefepime, vancomycin, and clindamycin.  Podiatry consulted for I&D before transfer to floor.    Medications:     Scheduled Meds:   piperacillin-tazobactam  3,375 mg IntraVENous Q8H    atorvastatin  40 mg Oral Nightly    cetirizine  10 mg Oral Daily    predniSONE  20 mg Oral Daily    aspirin  325 mg Oral Daily    sodium chloride flush  5-40 mL IntraVENous 2 times per day    enoxaparin  30 mg SubCUTAneous BID    doxepin  10 mg Oral Nightly    pantoprazole  40 mg Oral QAM AC    sodium chloride flush  5-40 mL IntraVENous 2 times per day    atenolol  50 mg Oral Daily    [Held by 
Progress Note    Admit Date: 9/21/2024  Day: 4  Diet: ADULT DIET; Regular  ADULT ORAL NUTRITION SUPPLEMENT; Breakfast, Dinner; Wound Healing Oral Supplement    CC:  Shortness of Breath and Headache     Interval history:   Patient examined and evaluated at bedside this morning. Patient feeling well, no complaints. He denies any chest pain, shortness of breath, abdominal pain, pain in his extremities.  Rash seems improved after discontinuation of Unasyn.  Patient denies any complaints.  Vital stable, apart from patient having SBP in 170s.  HR in 50s.  Patient's atenolol dose was halved.  Fluids were stopped.  Repeat SBP in 140s.    Patient awaiting disposition.  Concerned about cost of antibiotics and discussing feasible choices with case management.    HPI:   Dominick Aguayo is a 74 y.o. 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 Since February, he had been taking Bactrim per his podiatrist. Despite taking the meds, he began feeling symptomatic with SOB and malaise on Monday 9/16. His dyspnea resolved before presenting to the ED on 9/21. He denied fevers and chills at presentation.     ED Course: Labs significant for elevated CRP and sed rate.  Left foot x-ray concerning for gas formation.  Started empirically on cefepime, vancomycin, and clindamycin.  Podiatry consulted for I&D before transfer to floor.    Medications:     Scheduled Meds:   piperacillin-tazobactam  4,500 mg IntraVENous 3 times per day    cetirizine  10 mg Oral Daily    predniSONE  20 mg Oral Daily    aspirin  325 mg Oral Daily    sodium chloride flush  5-40 mL IntraVENous 2 times per day    enoxaparin  30 mg SubCUTAneous BID    doxepin  10 mg Oral Nightly    pantoprazole  40 mg Oral QAM AC    sodium chloride flush  5-40 mL IntraVENous 2 times per day    [Held by provider] atenolol  50 mg Oral Daily    
Progress Note    Admit Date: 9/21/2024  Day: 4  Diet: ADULT DIET; Regular  ADULT ORAL NUTRITION SUPPLEMENT; Breakfast, Lunch, Dinner; Standard High Calorie/High Protein Oral Supplement    CC:  Shortness of Breath and Headache     Interval history:   Patient seen and examined beside this AM. He reports feeling well. Podiatry previously noted decreased bleeding in the LLE. Patient taken for LLE angiography this AM. Denies any acute complaints prior to procedure. Angioplasty of the L anterior tibial artery and L posterior tibial artery placed, per post-op note.  He denies SOB, chest pain, palpitations, chest tightness, and abdominal pain.    Ongoing discussions regarding dispo. Patient expresses continued interest in returning home with the help of a mobility scooter.    HPI:   Dominick Aguayo is a 74 y.o. 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 Since February, he had been taking Bactrim per his podiatrist. Despite taking the meds, he began feeling symptomatic with SOB and malaise on Monday 9/16. His dyspnea resolved before presenting to the ED on 9/21. He denied fevers and chills at presentation.     ED Course: Labs significant for elevated CRP and sed rate.  Left foot x-ray concerning for gas formation.  Started empirically on cefepime, vancomycin, and clindamycin.  Podiatry consulted for I&D before transfer to floor.    Medications:     Scheduled Meds:   [START ON 9/27/2024] aspirin  325 mg Oral Daily    [Held by provider] atorvastatin  40 mg Oral Nightly    sodium chloride flush  5-40 mL IntraVENous 2 times per day    DAPTOmycin (CUBICIN) 650 mg in sodium chloride 0.9 % 50 mL IVPB  6 mg/kg IntraVENous Q24H    piperacillin-tazobactam  3,375 mg IntraVENous Q8H    enoxaparin  30 mg SubCUTAneous BID    doxepin  10 mg Oral Nightly    pantoprazole  40 mg Oral QAM AC    
Progress Note    Admit Date: 9/21/2024  Day: 4  Diet: ADULT DIET; Regular; 4 carb choices (60 gm/meal)    CC:  Shortness of Breath and Headache     Interval history:   Patient underwent a formal washout in the OR yesterday morning (9/23) on his L distal extremity for a suspected necrotizing soft-tissue infection. He was seen and examined bedside this AM. He reports doing well. He remains afebrile and is still receiving IV abx of 2 g Cefepime, 900 mg Clindamycin, and 1 g Vancomycin. He declined PT/OT care yesterday. He notes some mild headache, but he acknowledged that he has not been drinking much water since his IVF were removed. He has no acute complaints and denies SOB, chest pain, palpitations, chest tightness, and abdominal pain.    Tissue culture growing E. Fecalis.    HPI:   Dominick Aguayo is a 74 y.o. 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 Since February, he had been taking Bactrim per his podiatrist. Despite taking the meds, he began feeling symptomatic with SOB and malaise on Monday 9/16. His dyspnea resolved before presenting to the ED on 9/21. He denied fevers and chills at presentation.     ED Course: Labs significant for elevated CRP and sed rate.  Left foot x-ray concerning for gas formation.  Started empirically on cefepime, vancomycin, and clindamycin.  Podiatry consulted for I&D before transfer to floor.    Medications:     Scheduled Meds:   vancomycin  1,000 mg IntraVENous Q12H    enoxaparin  30 mg SubCUTAneous BID    cefepime  2,000 mg IntraVENous Q8H    doxepin  10 mg Oral Nightly    pantoprazole  40 mg Oral QAM AC    sodium chloride flush  5-40 mL IntraVENous 2 times per day    atenolol  50 mg Oral Daily    [Held by provider] chlorthalidone  25 mg Oral Daily    clindamycin (CLEOCIN) IV  900 mg IntraVENous Q8H    insulin lispro  0-4 Units 
Pt A+Ox4. VSS. Pt had no pain overnight. Pt IVF infusing per MAR. Pt IV abx infusing per MAR. Pt had adequate urine output. Standard and fall precautions in place. Pt wound vac in place without issue. All needs met at this time. Pt aware to call if help is needed.    Electronically signed by Harpreet Hernandez RN on 9/24/2024 at 4:54 AM    
Pt arrived from OR on bed and RA.  Pt shows no s/s of pain or sob.   Report from CRNA and OR RN was negative for issues during procedure.   INCISION AND DRAINAGE LEFT FOOT WITH RESECTION OF OSTEOMYELYTIC BONE - Left  Marisol Yates DPM  
Pt did a couple of sit to stands and did a stand pivot to the commode. Pt also had a medium formed BM.    Electronically signed by Harpreet Hernandez RN on 9/24/2024 at 11:29 PM    
Pt refused ambulation stating they had an issue with their surgical shoe yesterday and they can't use their regular shoes. Pt also states their NWB status will \"make things hard\". Pt education on importance of early ambulation but pt still refuses.    Electronically signed by Harpreet Hernandez RN on 9/24/2024 at 3:33 AM    
[facility] - Clinical Pharmacy Note - Extended Infusion Beta-Lactam Adjustment    Piperacillin/Tazobactam ordered for treatment of DFI. Per [facility] Extended Infusion Beta-Lactam Policy, Zosyn will be changed to 3375 mg IV EI q8h.     Estimated Creatinine Clearance: Estimated Creatinine Clearance: 88 mL/min (based on SCr of 1 mg/dL).  Dialysis Status, DEBBIE, CKD: n/a   BMI: Body mass index is 28.89 kg/m².    Rationale for Adjustment: Agent is renally eliminated and demonstrates time-dependent effect on bacterial eradication. Extended-infusion dosing strategy aims to enhance microbiologic and clinical efficacy.    Pharmacy will continue to monitor renal function and adjust dose as necessary.      Please call with questions--  Edilma AltmanD, BCPS  Wireless: [phone]   10/1/2024 8:05 AM      
The [facility] - Acute Rehab Unit   After review, this patient is felt to be:       []  Dr. Ivey states this patient is appropriate for rehab.     [x]  Not appropriate for Acute Inpatient Rehab    []  Referral received and ARU reviewing patient      Defer to SNF level of care for long term IV antibiotics treatment   Will notify CM/SW with further updates. Thank you for the referral.    Cadence JOHNSON, OTR/L  Clinical Liaison- [facility]   (P): [phone]  (F): [phone]    
[facility] -  Clinical Pharmacy Note    Vancomycin - Management by Pharmacy    Consult Date(s): 9/23  Consulting Provider(s): Dr Prado    Assessment / Plan  1)  Left foot necrotizing infection - Vancomycin  Concurrent Antimicrobials: Cefepime, Clindamycin  Day of Vanc Therapy / Ordered Duration: 1 of TBD  Current Dosing Method: Bayesian-Guided AUC Dosing  Therapeutic Goal: -600 mg/L*hr  Current Dose / Plan:   Pt with DEBBIE on admission; baseline SCr 0.9.  SCr 1.8-->1.3-->1.1-->1.0 today.  Currently on 1000mg IV q12h.  Random level this AM = 10.4 mcg/mL; Calculated AUC = 433 with trough = 13.4 mcg/mL.  Would likely increase dose to 1250mg IV q12h for  due to necrotizing nature of infection; however, Vancomycin is being changed to Daptomycin by ID.  Will continue to monitor clinical condition and make adjustments to regimen as appropriate.    Please call with questions--  Thanks--  Mary Bailey, PharmD, BCPS, BCGP  [id number] ([facility])   9/24/2024 9:46 AM        Interval update:  Pt is s/p left foot I&D with bone biopsy and wound vac application (done 9/23).  Foot culture 9/21 with mixed skin prabhjot; surgical cultures 9/23 in process.    Subjective/Objective:   Dominick Aguayo is a 74 y.o. 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  Pt is s/p left foot I&D with bone biopsy and wound vac application (done 9/23).    Pharmacy is consulted to dose Vancomycin.    Ht Readings from Last 1 Encounters:   09/21/24 1.956 m (6' 5\")     Wt Readings from Last 1 Encounters:   09/23/24 110.6 kg (243 lb 13.3 oz)     Current & Prior Antimicrobial Regimen(s):  Cefepime (9/21-current)  Clindamycin (9/21-current)  Vancomycin 2500mg IV x1 in ED 9/21 (not continued on admission; confirmed with IM resident 9/22)  Vancomycin - Pharmacy 
Vascular Surgery   Daily Progress Note  Patient: Dominick Aguayo      CC: LLE monophasic waveform on arterial duplex     SUBJECTIVE:   No acute events overnight. Denies pain in bilateral lower extremities.     ROS:   A 14 point review of systems was conducted, significant findings as noted above. All other systems negative.    OBJECTIVE:    PHYSICAL EXAM:    Vitals:    09/25/24 2154 09/25/24 2353 09/26/24 0319 09/26/24 0320   BP: 133/75 117/75  128/77   Pulse: 65 75  80   Resp: 18 18  18   Temp: 98.1 °F (36.7 °C) 98.3 °F (36.8 °C)  98.2 °F (36.8 °C)   TempSrc: Oral Oral  Oral   SpO2: 95% 94%  95%   Weight:   107.4 kg (236 lb 12.4 oz)    Height:           General appearance: alert, no acute distress, grooming appropriate  Eyes: PERRL, no scleral icterus  Neck: trachea midline, no JVD  Chest/Lungs: normal effort, no adventitious breathing, no accessory muscle use, on RA  Cardiovascular: RRR  Abdomen: soft, non-tender, non-distended, no guarding/rigidity   Skin: warm and dry, bilateral midfoot amputations,  L foot wound vac in place with good seal, bilateral LE wounds with dressings C/D/I     Pulses     F P PT AT DP   R + -/+ + + -/+   L + -/+ + + -/+    +, -/+ ,-/-      Extremities: no edema, no cyanosis  Neuro: A&Ox3, no focal deficits, sensation intact    ASSESSMENT & PLAN:   This is a 74 y.o. male with Hx of Idiopathic neuropathy, HTN, HLD, bilateral midfoot amputations secondary to osteomyelitis, who presents to [facility] for left foot necrotizing infection and has now undergone both bedside and OR debridement. The patient was found to have poor bleeding from his left foot in the operating room. The patient had a bilateral lower extremity arterial duplex completed that showed diffuse multiphasic waveform in the left lower extremity with ABIs of 1. Vascular surgery was consulted to evaluate LLE monophasic waveform on arterial duplex.      - Plan LLE angiography today  - NPO  - Management of foot wounds per Podiatry  - 
Vascular Surgery  Post-operative Note      Procedure(s) Performed: Abdominal aortogram and left lower extremity arteriogram. Left anterior tibial artery angioplasty 3 mm x 60 mm Bradly balloon. Left posterior tibial artery angioplasty 4 mm x 40 mm [place] drug-eluting balloon      Subjective:   Patient's pain is controlled, denies nausea or vomiting. Tolerating general diet. Denies flatus or BM at this time.     Objective:  Anesthesia type: IV sedation      I/O    Intra op    Post op     Fluids  700 mL 300 mL     EBL 35 mL 0 mL     Urine -- mL 0 x     Vitals:   Vitals:    09/26/24 1000 09/26/24 1030 09/26/24 1100 09/26/24 1130   BP: 112/67 115/64 120/71 113/65   Pulse: 69 56 50 56   Resp:    18   Temp:    97.6 °F (36.4 °C)   TempSrc:    Oral   SpO2:    96%   Weight:       Height:           Physical Exam:  Post-op vital signs:  Stable   General appearance: alert, no acute distress  Eyes: No scleral icterus, EOM grossly intact  Neck: trachea midline, neck supple  Chest/Lungs: normal effort with no accessory muscle use on RA  Cardiovascular: RRR,  well perfused  Skin: warm and dry, no rashes  Extremities: no edema, no cyanosis. Bilateral femoral pulses palpated, bilateral popliteal signals heard. DP, PT signals strong bilaterally. Right access site c/d/I, no bleeding, no hematoma.  Genitourinary: Grossly normal  Neuro: A&Ox3, no focal deficits, sensation intact    Assessment and Plan  This is a 74 y.o. year old male status post abdominal aortogram and LLE arteriogram, left anterior tibial artery angioplasty 3 mm x 60 mm Bradly balloon, left posterior tibial artery angioplasty 4 mm x 40 mm [place] drug-eluting balloon secondary to osteomyelitis & LLE monophasic waveform on arterial duplex. (9/26) POD0.    Pain management: PRN tylenol  Cardiovasc: hemodynamically stable, will continue to monitor  Respiratory:  IS ordered to bedside, encourage hourly IS and deep breathing, wean oxygen as tolerated  Fluids:  LR 75 ml, 
    Left lower extremity:  Wound vac in place.     MUSCULOSKELETAL: Muscle strength is 5/5 for all pedal groups tested. No pain with palpation of the foot or ankle b/l. Ankle joint ROM is decreased in dorsiflexion with the knee extended. Multiple foot amputations      IMAGING:  XR left foot (9/23/2024-postop)  FINDINGS:  Transmetatarsal amputation again demonstrated. Soft tissue ulceration/defect  laterally near base of fifth metatarsal at site of incision/drainage and wound  VAC. Distal soft tissue swelling.  IMPRESSION:     1. Postop evaluation as described.     Vascular duplex of lower extremity arteries B/L (9/23/2024)  Proximal Common Femoral Artery: Multiphasic (normal) Doppler waveforms.   Distal Common Femoral Artery: Multiphasic (normal) Doppler waveforms.   Profunda Artery: Multiphasic (normal) Doppler waveforms.   Proximal Superficial Femoral Artery: Multiphasic (normal) Doppler waveforms.   Middle Superficial Femoral Artery: Multiphasic (normal) Doppler waveforms.   Distal Superficial Femoral Artery: Multiphasic (normal) Doppler waveforms.   Proximal Popliteal Artery: Multiphasic (normal) Doppler waveforms.   Distal Popliteal Artery: Multiphasic (normal) Doppler waveforms.   Tibial/Peroneal Trunk: Multiphasic (normal) Doppler waveforms.   Distal Anterior Tibial Artery: Multiphasic (normal) Doppler waveforms.   Middle Posterior Tibial Artery: Multiphasic (normal) Doppler waveforms  Distal Posterior Tibial Artery: Multiphasic (normal) Doppler waveforms.   Middle Peroneal Artery: Multiphasic (normal) Doppler waveforms.     The right ankle brachial index is 1.03 (DP: 136 mmHg, PT: 144 mmHg)  Multiphasic flow in the right common femoral artery indicates no significant aorto-iliac inflow disease.  There is multiphasic flow throughout the right lower extremity arteries scanned, with no visible atherosclerotic plaque or focal lesions.      Left Lower Arterial     Proximal Common Femoral Artery: Multiphasic 
Recommend start ASA and statin  - Management of foot wounds per Podiatry  - Abx per ID  - Medical management per primary  - Please reach out to vascular surgery with any questions or concerns    Louie Oquendo DO  PGY-1, General Surgery Resident  09/27/24 6:10 AM  Yoselyn  [phone]   
mL/hr at 09/22/24 0029    dextrose      sodium chloride 100 mL/hr at 09/22/24 0414     PRN Meds:sodium chloride flush, sodium chloride, ondansetron **OR** ondansetron, polyethylene glycol, acetaminophen **OR** acetaminophen, glucose, dextrose bolus **OR** dextrose bolus, glucagon (rDNA), dextrose    Objective:   Vitals:   T-max:  Patient Vitals for the past 8 hrs:   BP Temp Temp src Pulse Resp SpO2   09/22/24 0812 124/62 98.1 °F (36.7 °C) Oral 61 14 95 %   09/22/24 0333 109/64 98.7 °F (37.1 °C) Oral 67 16 97 %       Intake/Output Summary (Last 24 hours) at 9/22/2024 0837  Last data filed at 9/22/2024 0815  Gross per 24 hour   Intake 1650 ml   Output 775 ml   Net 875 ml       Review of Systems  Per interval history    Physical Exam  Constitutional:       General: He is not in acute distress.  HENT:      Head: Normocephalic and atraumatic.   Eyes:      Conjunctiva/sclera: Conjunctivae normal.   Cardiovascular:      Rate and Rhythm: Normal rate and regular rhythm.      Pulses: Normal pulses.      Heart sounds: Normal heart sounds. No murmur heard.  Pulmonary:      Effort: Pulmonary effort is normal. No respiratory distress.      Breath sounds: Normal breath sounds.   Abdominal:      General: Abdomen is flat. Bowel sounds are normal. There is no distension.      Palpations: Abdomen is soft.      Tenderness: There is no abdominal tenderness.   Musculoskeletal:      Comments: R foot with prior TMA, left foot wrapped in ACE wrap   Skin:     Findings: No rash.   Neurological:      Mental Status: He is alert and oriented to person, place, and time.   Psychiatric:         Mood and Affect: Mood normal.         Behavior: Behavior normal.           LABS:    CBC:   Recent Labs     09/21/24  1629 09/22/24  0431   WBC 12.2* 8.1   HGB 14.3 12.9*   HCT 43.1 38.9*    113*   MCV 91.5 92.7     Renal:    Recent Labs     09/21/24 1629 09/22/24  0431   * 134*   K 3.1* 3.4*   CL 90* 98*   CO2 24 22   BUN 34* 33*   CREATININE 
understanding.     Safety Devices  Pt left with needs in reach. In bed with PICC nurse present. RN updated.     AM-PAC score  AM-PAC Inpatient Mobility Raw Score : 15  AM-PAC Inpatient T-Scale Score : 39.45  Mobility Inpatient CMS 0-100% Score: 57.7  Mobility Inpatient CMS G-Code Modifier : CK    Goals: (as determined and assessed by primary PT)  Time Frame for Short Term Goals: by discharge  Short Term Goal 1: Pt will perform bed mobility with supervision   Short Term Goal 2: Pt will transfer sit to and from stand with supervision maintaining L LE NWB   Short Term Goal 3: Pt will ambulate 150 feet using knee scooter for L LE NWB and SBA          Plan:  Plan: 2-5x/week  Current Treatment Recommendations: Strengthening, Balance training, Functional mobility training, Transfer training, Gait training, Safety education & training, Therapeutic activities    Therapy Time    Individual  Concurrent  Group  Co-treatment    Time In  1405            Time Out  1430            Minutes  25              Timed Code Treatment Minutes: 25  Total Treatment Minutes: 25    Will continue per plan of care.   If patient is discharged prior to next treatment, this note will serve as the discharge summary.    Sana Santamaria, PTA #[id number]              
(normal) Doppler waveforms.   Distal Common Femoral Artery: Multiphasic (normal) Doppler waveforms.   Profunda Artery: Multiphasic (normal) Doppler waveforms. .   Proximal Superficial Femoral Artery: Multiphasic (normal) Doppler waveforms.   Middle Superficial Femoral Artery: Monophasic Doppler waveforms.   Distal Superficial Femoral Artery: Monophasic Doppler waveforms.   Proximal Popliteal Artery: Monophasic Doppler waveforms.   Distal Popliteal Artery: Monophasic Doppler waveforms.   Tibial/Peroneal Trunk: Monophasic Doppler waveforms.   Distal Anterior Tibial Artery: Monophasic Doppler waveforms.   Middle Posterior Tibial Artery: Monophasic Doppler waveforms.   Distal Posterior Tibial Artery: Monophasic Doppler waveforms.   Distal Peroneal Artery: Monophasic Doppler waveforms    The left ankle brachial index is 1.01 (DP: 142 mmHg, PT: 130 mmHg)  Multiphasic flow in the left common femoral artery indicates no significant aorto-iliac inflow disease.  No evidence of atherosclerotic plaque in the left lower extremity arteries scanned, consistent with <50% stenosis.      MRI left foot (9/22/2024)  IMPRESSION:  1.  Dorsal lateral forefoot soft tissue ulceration with osteomyelitis of the  fifth metatarsal base remnant.  2.  Plantar lateral soft tissue ulceration tracking medially to an 8 mm focus of  susceptibility along the plantar fascia, possible foreign body. This appears to  correspond to a rounded area of lucency on prior radiograph.     XR left foot (9/21/2024)  FINDINGS/  IMPRESSION  Status post transmetatarsal amputation. Soft tissue swelling and gas seen at the stump, particularly at the medial aspect may represent gas forming soft tissue infection or ulceration. Slight cortical irregularity, cannot exclude osteomyelitis.     XR left foot (9/21/2024-post bedside I&D)  FINDINGS/  IMPRESSION:  Status post transmetatarsal amputation. Soft tissue swelling at the stump. Probable packing of the lateral tissues of 
Daily    aspirin  325 mg Oral Daily    sodium chloride flush  5-40 mL IntraVENous 2 times per day    enoxaparin  30 mg SubCUTAneous BID    doxepin  10 mg Oral Nightly    pantoprazole  40 mg Oral QAM AC    sodium chloride flush  5-40 mL IntraVENous 2 times per day    atenolol  50 mg Oral Daily    [Held by provider] chlorthalidone  25 mg Oral Daily       Continuous Infusions:   sodium chloride 25 mL (09/29/24 1330)    sodium chloride 25 mL (09/28/24 1354)    dextrose         PRN Meds:  sodium chloride flush, sodium chloride, sodium chloride flush, sodium chloride, ondansetron **OR** ondansetron, polyethylene glycol, acetaminophen **OR** acetaminophen, glucose, dextrose bolus **OR** dextrose bolus, glucagon (rDNA), dextrose      Assessment:     PMH -  neuropathy, HTN, MIKE, HCV, MARY  PSurgHx - gastric bypass, mult foot surg     Hx severe neuropathy (NOT DM).    Hx R TMA 9/13/16  Hx L  TMA 9/8/17   - L TMA stump infection / SSI  - s/p debridement 9/24, 9/27, 9/30  - hx MDR Pseudomonas   Hx DEBBIE / ARF on tobramycin, colistimate     L plantar foot ulcer.     IMP/  L foot limb necrotizing infection / limb threatening    - polymicrobial cult - Prevotella, Streptococcus, Enterococcus, Peptoniphus sp (formerly Peptostrepococcus)   - debridement 9/23, little bleeding, necrotic tissue / bone     PAD    - L LE angiogram 9/26, L post tib / ant tib angioplasty    Rash   - occurred after started Unasyn yet did NOT have rash with zosyn   - pt is willing to restart Zosyn to see if rash recurs     Plan:     Restart Zosyn    Will review x-ray and MRI w Rad  Wound care and f/u per Podiatry    Discharge rx  depends on whether she has recurrent rash w Zosyn  Pt request different home infusion provider    Medical Decision Making:  The following items were considered in medical decision making:  Discussion of patient care with other providers  Reviewed clinical lab tests  Reviewed radiology tests  Reviewed other diagnostic 
waveforms.   Distal Popliteal Artery: Multiphasic (normal) Doppler waveforms.   Tibial/Peroneal Trunk: Multiphasic (normal) Doppler waveforms.   Distal Anterior Tibial Artery: Multiphasic (normal) Doppler waveforms.   Middle Posterior Tibial Artery: Multiphasic (normal) Doppler waveforms  Distal Posterior Tibial Artery: Multiphasic (normal) Doppler waveforms.   Middle Peroneal Artery: Multiphasic (normal) Doppler waveforms.     The right ankle brachial index is 1.03 (DP: 136 mmHg, PT: 144 mmHg)  Multiphasic flow in the right common femoral artery indicates no significant aorto-iliac inflow disease.  There is multiphasic flow throughout the right lower extremity arteries scanned, with no visible atherosclerotic plaque or focal lesions.     Left Lower Arterial    Proximal Common Femoral Artery: Multiphasic (normal) Doppler waveforms.   Distal Common Femoral Artery: Multiphasic (normal) Doppler waveforms.   Profunda Artery: Multiphasic (normal) Doppler waveforms. .   Proximal Superficial Femoral Artery: Multiphasic (normal) Doppler waveforms.   Middle Superficial Femoral Artery: Monophasic Doppler waveforms.   Distal Superficial Femoral Artery: Monophasic Doppler waveforms.   Proximal Popliteal Artery: Monophasic Doppler waveforms.   Distal Popliteal Artery: Monophasic Doppler waveforms.   Tibial/Peroneal Trunk: Monophasic Doppler waveforms.   Distal Anterior Tibial Artery: Monophasic Doppler waveforms.   Middle Posterior Tibial Artery: Monophasic Doppler waveforms.   Distal Posterior Tibial Artery: Monophasic Doppler waveforms.   Distal Peroneal Artery: Monophasic Doppler waveforms    The left ankle brachial index is 1.01 (DP: 142 mmHg, PT: 130 mmHg)  Multiphasic flow in the left common femoral artery indicates no significant aorto-iliac inflow disease.  No evidence of atherosclerotic plaque in the left lower extremity arteries scanned, consistent with <50% stenosis.     MRI left foot (9/22/2024)  IMPRESSION:     1.  
Dorsal lateral forefoot soft tissue ulceration with osteomyelitis of the   fifth metatarsal base remnant.   2.  Plantar lateral soft tissue ulceration tracking medially to an 8 mm focus of   susceptibility along the plantar fascia, possible foreign body. This appears to   correspond to a rounded area of lucency on prior radiograph.      Electronically signed by Gabino Davis      XR FOOT LEFT (MIN 3 VIEWS)   Final Result      Status post transmetatarsal dictation. Soft tissue swelling at the stump. Probable packing of the lateral tissues of the stomach. Soft tissue gas at the stump could represent ulceration or soft tissue infection      Electronically signed by Van Clark      XR FOOT LEFT (MIN 3 VIEWS)   Final Result      Status post transmetatarsal amputation. Soft tissue swelling and gas seen at the stump, particularly at the medial aspect may represent gas forming soft tissue infection or ulceration. Slight cortical irregularity, cannot exclude osteomyelitis.      Electronically signed by Van Clark          Assessment/Plan:     Dominick Aguayo is a 74 y.o. male with PMHx T2DM, HTN, HLD, chronic hep C who presents with malaise.     #Left Foot Necrotizing Foot Infection  #T2DM w/ peripheral neuropathy   Pt taken to OR 9/23 for incision and drainage. Cultures from this procedure show growth of E. Faecalis and Strep. Viridans. Patient has prior history of infection with Pseudomonas.   - Discontinue IV Cefepime 2000 mg Q8  - Discontinue IV Vancomycin 1000 mg Q12  - Discontinue IV Clindamycin 900 mg Q8  - Per ID recommendations: Add IV Daptomycin 650 mg Q24 and add IV Zosyn 3375 mg Q8  - Possible PICC line placement today to support long-term abx therapy    #DEBBIE, likely pre-renal   Baseline creatinine 0.9 but 1.8 on admission. Creatinine today 0.9. IVF hydration was d/c yesterday evening. Relative polycythemia resolved at this time. DEBBIE likely resolved.  -  Encourage PO intake as tolerated  -  Chlorthalidone held at 
Monophasic Doppler waveforms.   Distal Anterior Tibial Artery: Monophasic Doppler waveforms.   Middle Posterior Tibial Artery: Monophasic Doppler waveforms.   Distal Posterior Tibial Artery: Monophasic Doppler waveforms.   Distal Peroneal Artery: Monophasic Doppler waveforms    The left ankle brachial index is 1.01 (DP: 142 mmHg, PT: 130 mmHg)  Multiphasic flow in the left common femoral artery indicates no significant aorto-iliac inflow disease.  No evidence of atherosclerotic plaque in the left lower extremity arteries scanned, consistent with <50% stenosis.     MRI left foot (9/22/2024)  IMPRESSION:     1.  Dorsal lateral forefoot soft tissue ulceration with osteomyelitis of the  fifth metatarsal base remnant.  2.  Plantar lateral soft tissue ulceration tracking medially to an 8 mm focus of  susceptibility along the plantar fascia, possible foreign body. This appears to  correspond to a rounded area of lucency on prior radiograph.      XR left foot (9/21/2024)  FINDINGS/  IMPRESSION:     Status post transmetatarsal amputation. Soft tissue swelling and gas seen at the stump, particularly at the medial aspect may represent gas forming soft tissue infection or ulceration. Slight cortical irregularity, cannot exclude osteomyelitis.     XR left foot (9/21/2024-post bedside I&D)  FINDINGS/  IMPRESSION:     Status post transmetatarsal amputation. Soft tissue swelling at the stump. Probable packing of the lateral tissues of the stomach. 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 Leukocytosis resolved (5.3WBC).  -ESR 96 .9 on 9/24/2024; repeat inflamm markers ordered   -A1c 5.5 
particularly at the medial aspect may represent gas forming soft tissue infection or ulceration. Slight cortical irregularity, cannot exclude osteomyelitis.      Electronically signed by Van Clark                      Assessment/Plan:   Dominick Aguayo is a 74 y.o. male with PMHx T2DM, HTN, HLD, chronic hep C who presents with malaise.    Medically stable for discharge, pending placement     #Left Foot Necrotizing Foot Infection  #Idiopathic peripheral neuropathy   Pt taken to OR 9/23 for incision and drainage on the LLE. Angioplasty of the LLE performed 9/26. Cultures from this procedure show growth of E. Faecalis and Strep. Viridans. Patient has prior history of infection with Pseudomonas.  - ID consulted, appreciate recs  - Pivoting from Unasyn to Daptomycin + Flagyl d/t allergic reaction  - Dapto 700 mg IV Q24H through 11/8/2024  - Flagyl 500 mg PO TID through 11/8/2024  - PICC line placed 9/25 for ongoing Abx therapy    #Allergic drug reaction  Most likely medication allergy to Unasyn, penicillins. Morbilliform rash over back, buttocks, worsening/spreading over ~18 hours. See chart for images.   - Changed abx per above  - Continue Cetrizine 10 mg daily until rash improves  - Prednisone 20 mg daily for 5 days    #Allergic contact dermatitis  Rashes developing in square shapes under previous telemetry lead stickers. Adhesive allergy. Will note in chart.  - Cetirizine and prednisone per above, if they become itchy can use triamcinolone cream  - D/C telemetry, if need to use again recommend sensitive skin lead stickers    #PAD  Patient has history of peripheral arterial disease.  KEVIN 1.  However, patient observed to have decreased bleeding during surgery by podiatry.  Vascular surgery consulted due to LLE monophasic waveform on arterial duplex..  Patient underwent LLE angiogram (9/26).  Underwent left anterior and posterior tibial artery angioplasty.  - Started aspirin 325 Mg daily  - restarted atorvastatin 40 Mg 
radiologic images - will review imaging w Radiologist   Microbiology cultures and other micro tests reviewed      Discussed with pt, Medical Team including Dr Julisa Green MD    Recommended Follow-up, Labs or Other Treatments After Discharge:    ID INFUSION ORDERS:    Unasyn 12 gm iv daily by continuous infusion through   - Diagnosis - L foot osteomyelitis  - First dose given in hospital  - Routine CVC care   - Labs: CBC w diff, CMP, ESR, CRP every Mon or Tue, FAX results to [phone]  - Call with antibiotic / infusion issues, [phone]  - Call with any change in status, transfer in or out of a facility or to hospital - [phone]  - Orders only valid for current disposition, NOT transferable upon discharge from facility or new admission to another facility.  - No f/u in outpatient ID office necessary    George Green MD           
the  fifth metatarsal base remnant.  2.  Plantar lateral soft tissue ulceration tracking medially to an 8 mm focus of  susceptibility along the plantar fascia, possible foreign body. This appears to  correspond to a rounded area of lucency on prior radiograph.      XR left foot (9/21/2024)  FINDINGS/  IMPRESSION:     Status post transmetatarsal amputation. Soft tissue swelling and gas seen at the stump, particularly at the medial aspect may represent gas forming soft tissue infection or ulceration. Slight cortical irregularity, cannot exclude osteomyelitis.     XR left foot (9/21/2024-post bedside I&D)  FINDINGS/  IMPRESSION:     Status post transmetatarsal amputation. Soft tissue swelling at the stump. Probable packing of the lateral tissues of the stomach. Soft tissue gas at the stump could represent ulceration or soft tissue infection    ASSESSMENT/PLAN  -S/P I&D left foot (DOS 9/23/2024)  -Chronic ulcer right plantar midfoot without signs of infection  -Condition is complicated by idiopathic peripheral neuropathy (last A1c 5.2 3/20/2024)  -Chronic hepatitis C    -Patient examined and evaluated at the bedside   -VSS. Leukocytosis resolved (5.5WBC).  -ESR 64 .7 on 9/23/2024; repeat inflamm markers ordered   -A1c 5.5 (9/22/24)  - Prealbumin 7.0, wound healing supplement ordered  -all imaging reviewed and impressions noted above   -Surgical path: Pending  -Tissue culture (9/23/2024): 4+ RBCs, no WBCs, no epithelial cells  -Fungal culture (9/23/2024): No fungal elements seen  -wound culture (9/21/2024): 4+ GPC, 4+ GPC, 2+ GNR, 2+ RBCs, 1+ WBC, no epithelial cells  -RLE dressed betadine & gauze, kreliex, ACE   -LLE left CDI with wound VAC intact and adequate suction appreciated  -Infectious disease consulted, appreciate recs  - Antibiotics per primary: IV cefepime, clinda, vancomycin  -NWB LLE, WBAT RLE    -Patient will require further surgical intervention while in house with potential repeat washout and 
the lateral tissues of the stomach. Soft tissue gas at the stump could represent ulceration or soft tissue infection      Electronically signed by Van Clark      XR FOOT LEFT (MIN 3 VIEWS)   Final Result      Status post transmetatarsal amputation. Soft tissue swelling and gas seen at the stump, particularly at the medial aspect may represent gas forming soft tissue infection or ulceration. Slight cortical irregularity, cannot exclude osteomyelitis.      Electronically signed by Van Clark          Assessment/Plan:     Dominick Aguayo is a 74 y.o. male with PMHx T2DM, HTN, HLD, chronic hep C who presents with malaise.     #Left Foot Necrotizing Foot Infection  #T2DM w/ peripheral neuropathy   Pt taken to OR 9/23 for incision and drainage on the LLE. Angioplasty of the LLE performed 9/26. Cultures from this procedure show growth of E. Faecalis and Strep. Viridans. Patient has prior history of infection with Pseudomonas. Due to financial constraints, Daptomycin and Zosyn therapy has been adjusted to be more cost sensitive.  - Discontinue IV Daptomycin 650 mg Q24   - Discontinue IV Zosyn 3375 mg Q8  - Start Unasyn 3,000 mg Q6, per ID recommendations  - PICC line placed 9/25 for ongoing Abx therapy      #DEBBIE, likely pre-renal   Baseline creatinine 0.9 but 1.8 on admission. Creatinine today 0.9. DEBBIE likely resolved.  -  Encourage PO intake as tolerated  -  Chlorthalidone held at this time.    #Chronic hepatitis C  Patient discussing hep C treatment with PCP.  - Continue discussion of treatment while patient is in the hospital     #HTN   Takes atenolol-chlorthalidone at home. Blood pressure is well-controlled.  - Chlorthalidone held at this time.  - Continue atenolol 50 mg PO daily    #HLD  Patient's statin therapy previously held due to altered LFTs. 9/26 LFTs suggest therapy may resume.  - Restart Lipitor 40 mg PO daily    DVT PPx: Lovenox  Code status:  Full Code     Disposition  - Preadmission: Home  - Current: 5

## 2024-10-02 NOTE — TELEPHONE ENCOUNTER
Spoke with Eva, nurse at [facility], and verified IV ATB and weekly lab orders -  Zosyn 3.375 q 8 hr extended infusion through 11/14/24  Labs: CBC w diff, CMP, ESR, CRP every Mon or Tue, FAX results to [phone]  Lab will be drawn on Mondays  Eva stated pt is doing fine this am, but could not elaborate as pt arrived last evening and she has only looked in on pt today

## 2024-10-07 LAB
ALBUMIN: 2.9 G/DL
ALP BLD-CCNC: 42 U/L
ALT SERPL-CCNC: 17 U/L
ANION GAP SERPL CALCULATED.3IONS-SCNC: ABNORMAL MMOL/L
AST SERPL-CCNC: 14 U/L
BASOPHILS ABSOLUTE: 0.1 /ΜL
BASOPHILS RELATIVE PERCENT: 0.18 %
BILIRUB SERPL-MCNC: 0.5 MG/DL (ref 0.1–1.4)
BUN BLDV-MCNC: 28 MG/DL
C-REACTIVE PROTEIN: 23.8
CALCIUM SERPL-MCNC: 7.1 MG/DL
CHLORIDE BLD-SCNC: 110 MMOL/L
CO2: 26 MMOL/L
CREAT SERPL-MCNC: 1.1 MG/DL
EOSINOPHILS ABSOLUTE: 0.1 /ΜL
EOSINOPHILS RELATIVE PERCENT: 3.4 %
GFR, ESTIMATED: ABNORMAL
GLUCOSE BLD-MCNC: 62 MG/DL
HCT VFR BLD CALC: 36.2 % (ref 41–53)
HEMOGLOBIN: 12.1 G/DL (ref 13.5–17.5)
LYMPHOCYTES ABSOLUTE: 1.1 /ΜL
LYMPHOCYTES RELATIVE PERCENT: 26.7 %
MCH RBC QN AUTO: 30 PG
MCHC RBC AUTO-ENTMCNC: 33.3 G/DL
MCV RBC AUTO: 90.1 FL
MONOCYTES ABSOLUTE: 0.6 /ΜL
MONOCYTES RELATIVE PERCENT: 13.4 %
NEUTROPHILS ABSOLUTE: 2.3 /ΜL
NEUTROPHILS RELATIVE PERCENT: 54.7 %
PDW BLD-RTO: 15.3 %
PLATELET # BLD: 147 K/ΜL
PMV BLD AUTO: 8.5 FL
POTASSIUM SERPL-SCNC: 4.2 MMOL/L
RBC # BLD: 4.02 10^6/ΜL
SED RATE, AUTOMATED: 18
SODIUM BLD-SCNC: 145 MMOL/L
TOTAL PROTEIN: 5.1 G/DL (ref 6.4–8.2)
WBC # BLD: 4.3 10^3/ML

## 2024-10-08 ENCOUNTER — TELEPHONE (OUTPATIENT)
Dept: INFECTIOUS DISEASES | Age: 74
End: 2024-10-08

## 2024-10-08 DIAGNOSIS — L08.9 LEFT FOOT INFECTION: ICD-10-CM

## 2024-10-08 NOTE — TELEPHONE ENCOUNTER
OPAT Nurse Coordinator Weekly Update Note    Current OPAT plan:  Zosyn 3.375 q 8 hr extended infusion through 24    Diagnosis:   L foot infection    Assessment: left  for nurse caring for pt today requesting a return call for a clinical update and how his foot is healing  My name, title, Dr Green's name, specialty and affiliation  Pt name and +  My direct number [phone]    10/10  Left 2nd  for facility  to return my call to obtain clinical update for pt and to inquire on how foot is healing  My name, title, Dr Green's name, specialty and affiliation  Pt name and +  My direct number [phone]    10/11  Left 3rd  for facility  to return my call to obtain clinical update for pt and to inquire on how foot is healing  My name, title, Dr Green's name, specialty and affiliation  Pt name and +  My direct number [phone]              Follow up appts: 10/17 hospital follow up

## 2024-10-14 LAB
ALBUMIN: 4 G/DL
ALP BLD-CCNC: 50 U/L
ALT SERPL-CCNC: 25 U/L
ANION GAP SERPL CALCULATED.3IONS-SCNC: ABNORMAL MMOL/L
AST SERPL-CCNC: 19 U/L
BASOPHILS ABSOLUTE: NORMAL
BASOPHILS RELATIVE PERCENT: 0.8 %
BILIRUB SERPL-MCNC: 0.8 MG/DL (ref 0.1–1.4)
BUN BLDV-MCNC: 29 MG/DL
C-REACTIVE PROTEIN: 10.3
CALCIUM SERPL-MCNC: 8.8 MG/DL
CHLORIDE BLD-SCNC: 105 MMOL/L
CO2: 25 MMOL/L
CREAT SERPL-MCNC: 1.2 MG/DL
EOSINOPHILS ABSOLUTE: 0.2 /ΜL
EOSINOPHILS RELATIVE PERCENT: 3.6 %
GFR, ESTIMATED: ABNORMAL
GLUCOSE BLD-MCNC: 81 MG/DL
HCT VFR BLD CALC: 43.7 % (ref 41–53)
HEMOGLOBIN: 14.3 G/DL (ref 13.5–17.5)
LYMPHOCYTES ABSOLUTE: 1.4 /ΜL
LYMPHOCYTES RELATIVE PERCENT: 22.4 %
MCH RBC QN AUTO: 29.5 PG
MCHC RBC AUTO-ENTMCNC: 32.6 G/DL
MCV RBC AUTO: 90.3 FL
MONOCYTES ABSOLUTE: 0.6 /ΜL
MONOCYTES RELATIVE PERCENT: 9.1 %
NEUTROPHILS ABSOLUTE: 4.1 /ΜL
NEUTROPHILS RELATIVE PERCENT: 64.1 %
PDW BLD-RTO: 15.8 %
PLATELET # BLD: 163 K/ΜL
PMV BLD AUTO: 8.7 FL
POTASSIUM SERPL-SCNC: 4 MMOL/L
RBC # BLD: 4.84 10^6/ΜL
SED RATE, AUTOMATED: 34
SODIUM BLD-SCNC: 144 MMOL/L
TOTAL PROTEIN: 6.6 G/DL (ref 6.4–8.2)
WBC # BLD: 6.4 10^3/ML

## 2024-10-15 ENCOUNTER — TELEPHONE (OUTPATIENT)
Dept: INTERNAL MEDICINE CLINIC | Age: 74
End: 2024-10-15

## 2024-10-15 ENCOUNTER — TELEPHONE (OUTPATIENT)
Dept: INFECTIOUS DISEASES | Age: 74
End: 2024-10-15

## 2024-10-15 DIAGNOSIS — L08.9 LEFT FOOT INFECTION: ICD-10-CM

## 2024-10-15 NOTE — TELEPHONE ENCOUNTER
BENJI with [facility] called to notify office pt is set to DC home with [facility] on 10/21   states [employer] ([phone]) will be supplying IV ATB and supplies to pt and needs an order directly from Dr Green faxed to them [phone]  BENJI unsure of who C will be at this time, but pt will be seen 3x weekly d/t wound vac.     Will verify with Dr Green it is ok to fax OPAT orders to [employer]    SW stated pt is doing well      Discussed with Dr Green, orders faxed to [employer]    Confirmation to be scanned into chart

## 2024-10-16 NOTE — TELEPHONE ENCOUNTER
Call returned spoke to Vicente  
Macarena is returning Mistys Call she is asking if we can fax over the order for wound vacc with setting, foam and frequency changes.    [phone]    Please fax order to [phone]  
Needs to go through surgeon  
Wendy from [facility] is calling to get verbal to see if Dr. Egan will follow patient while they do care in the home and sign for skilled nursing.     Also, pt will be getting a wound vacc and they will need orders for setting, foam and frequency changes.     Wendy [phone]  
Yes  will sign orders for the patient.    Message has been left for Wendy.  
daily

## 2024-10-21 LAB
FUNGUS SPEC CULT: NORMAL
LOEFFLER MB STN SPEC: NORMAL

## 2024-10-23 ENCOUNTER — TELEPHONE (OUTPATIENT)
Dept: INFECTIOUS DISEASES | Age: 74
End: 2024-10-23

## 2024-10-23 ENCOUNTER — CARE COORDINATION (OUTPATIENT)
Dept: CASE MANAGEMENT | Age: 74
End: 2024-10-23

## 2024-10-23 NOTE — TELEPHONE ENCOUNTER
Called pt -   Left NH and now at home  Has had f/u with Dr Yates, has 'knee scooter', ordered VAC    L foot deep space infection  Cult 9/23 - E faecalis, viridan Streptococcus, Prevotella bivia, Peptoniphilus sp  Pt is on Zosyn CI.  Pt notes it is 'pain' to have 24 hr infusion  Last labs 1/14 - CRP 10.3, ESR 34  End 11/14

## 2024-10-23 NOTE — CARE COORDINATION
Care Transitions Note    Initial Call - Call within 2 business days of discharge: Yes    Attempted to reach patient for transitions of care follow up. Unable to reach patient.    Outreach Attempts:   HIPAA compliant voicemail left for patient.     Patient: Dominick Aguayo    Patient : 1950   MRN: [id number]    Reason for Admission: L foot ulcer with necrotizing infection.   Discharge Date: 10/1/24  RURS: Readmission Risk Score: 12.1    Last Discharge Facility       Date Complaint Diagnosis Description Type Department Provider    24 Shortness of Breath; Headache Ulcer of left foot, unspecified ulcer stage (HCC) ... ED to Hosp-Admission (Discharged) (ADMITTED) [facility] 4 U Cadence Jones MD; Shalini Mirza...            Was this an external facility discharge? Yes. Discharge Date: 10/1. Facility Name:   [facility] to home with [facility]    Follow Up Appointment:   Patient has hospital follow up appointment scheduled within 14 days of discharge.  UTR - 15 days  Future Appointments         Provider Specialty Dept Phone    2024 2:30 PM Preethi Ibrahim MD Vascular Surgery [phone]            Plan for follow-up call in 2-5 days     DELMY Davidson, RN   [facility] Acute Care Coordinator  [phone]

## 2024-10-24 ENCOUNTER — CARE COORDINATION (OUTPATIENT)
Dept: CASE MANAGEMENT | Age: 74
End: 2024-10-24

## 2024-10-24 ENCOUNTER — TELEPHONE (OUTPATIENT)
Dept: INTERNAL MEDICINE CLINIC | Age: 74
End: 2024-10-24

## 2024-10-24 ENCOUNTER — CARE COORDINATION (OUTPATIENT)
Dept: CARE COORDINATION | Age: 74
End: 2024-10-24

## 2024-10-24 ENCOUNTER — PATIENT MESSAGE (OUTPATIENT)
Dept: INTERNAL MEDICINE CLINIC | Age: 74
End: 2024-10-24

## 2024-10-24 DIAGNOSIS — L97.529 ULCER OF LEFT FOOT, UNSPECIFIED ULCER STAGE (HCC): Primary | ICD-10-CM

## 2024-10-24 PROCEDURE — 1111F DSCHRG MED/CURRENT MED MERGE: CPT | Performed by: INTERNAL MEDICINE

## 2024-10-24 NOTE — CARE COORDINATION
Ambulatory Care Coordination Note     10/24/2024 3:45 PM     Patient Current Location:  Home: [address] [place] [address]  [address]  [facility] [id number]     This patient was received as a referral from Ambulatory Care Manager .    ACM contacted the patient by telephone. Verified name and  with patient as identifiers. Provided introduction to self, and explanation of the ACM role.   Patient declined care management services at this time.          ACM: Christina Summerlin     Challenges to be reviewed by the provider   Additional needs identified to be addressed with provider No  none               Method of communication with provider: none.    Has the patient been seen in the ED since your last call? no    Care Summary Note: ACM completed an enrollment outreach call to patient. ACM offered care management services to patient and he declined care management services stating that he has no needs or concerns at this time. ACM will enroll into care management if needs ever arise in the future.      PCP/Specialist follow up:   Future Appointments         Provider Specialty Dept Phone    2024 2:30 PM Preethi Ibrahim MD Vascular Surgery [phone]            Follow Up:   No further Ambulatory Care Management follow-up scheduled at this time.

## 2024-10-24 NOTE — CARE COORDINATION
Care Transitions Note    Initial Call - Call within 2 business days of discharge: Yes    Patient Current Location:  Home: [address] [place] [address]  [address]  [facility] [address]    Post Acute Care Manager contacted the patient by telephone to perform post hospital discharge assessment, verified name and  as identifiers. Provided introduction to self, and explanation of the Post Acute Care Manager role.     Patient: Dominick Aguayo    Patient : 1950   MRN: [id number]    Reason for Admission: L foot with necrotizing infection  Discharge Date: 10/1/24  RURS: Readmission Risk Score: 12.1      Last Discharge Facility       Date Complaint Diagnosis Description Type Department Provider    24 Shortness of Breath; Headache Ulcer of left foot, unspecified ulcer stage (HCC) ... ED to Hosp-Admission (Discharged) (ADMITTED) [facility] 4 U Cadence Jones MD; Shalini Mirza...            Was this an external facility discharge? Yes. Discharge Date: 10/2. Facility Name:   [facility] to home with [facility]    Additional needs identified to be addressed with provider   No needs identified             Method of communication with provider: none.    Patients top risk factors for readmission: medical condition-L foot gangrene    Interventions to address risk factors:   Education: completing antibiotics    Care Summary Note: Spoke with Dominick after 2 IDs. He is finding it a challenge being home but he is progressing. He is using a knee scooter to get around. [facility] nurse is coming M,W,F and she has showed him how to administer his Zosyn. He is currently giving himself Zosyn. States he had conference with Dr Green yesterday. States that he is very happy with [facility]. He does not sleep well and mostly takes naps. States this is his normal. States he is retired so this is ok. He states he has a good appetite. No DM. He orders his supplies on line to be delivered. Has all his medications. Reviewed medications. Needs

## 2024-10-24 NOTE — TELEPHONE ENCOUNTER
Atorvastatin  (Newest Message First)  View All Conversations on this Encounter  Dominick Aguayo \"Dominick Aguayo\"  P [facility] [place] [address] Practice Support (supporting Van Egan MD)47 minutes ago (12:47 PM)     TP  I am not certain who had me taking Atorvastatin while at [facility] but if you wish that I continue the medication then please write the prescription following the 90 day medicare protocol and send it to [facility].

## 2024-10-28 ENCOUNTER — TELEPHONE (OUTPATIENT)
Dept: INFECTIOUS DISEASES | Age: 74
End: 2024-10-28

## 2024-10-28 LAB
ALBUMIN SERPL-MCNC: 4.3 G/DL (ref 3.4–5)
ALBUMIN/GLOB SERPL: 2 {RATIO} (ref 1.1–2.2)
ALP SERPL-CCNC: 72 U/L (ref 40–129)
ALT SERPL-CCNC: 30 U/L (ref 10–40)
ANION GAP SERPL CALCULATED.3IONS-SCNC: 14 MMOL/L (ref 3–16)
AST SERPL-CCNC: 27 U/L (ref 15–37)
BASOPHILS # BLD: 0.1 K/UL (ref 0–0.2)
BASOPHILS NFR BLD: 1 %
BILIRUB SERPL-MCNC: 0.6 MG/DL (ref 0–1)
BUN SERPL-MCNC: 23 MG/DL (ref 7–20)
CALCIUM SERPL-MCNC: 9.5 MG/DL (ref 8.3–10.6)
CHLORIDE SERPL-SCNC: 98 MMOL/L (ref 99–110)
CO2 SERPL-SCNC: 28 MMOL/L (ref 21–32)
CREAT SERPL-MCNC: 1.1 MG/DL (ref 0.8–1.3)
CRP SERPL-MCNC: 60 MG/L (ref 0–5.1)
DEPRECATED RDW RBC AUTO: 16 % (ref 12.4–15.4)
EOSINOPHIL # BLD: 0.2 K/UL (ref 0–0.6)
EOSINOPHIL NFR BLD: 2.7 %
ERYTHROCYTE [SEDIMENTATION RATE] IN BLOOD BY WESTERGREN METHOD: 22 MM/HR (ref 0–20)
GFR SERPLBLD CREATININE-BSD FMLA CKD-EPI: 70 ML/MIN/{1.73_M2}
GLUCOSE SERPL-MCNC: 108 MG/DL (ref 70–99)
HCT VFR BLD AUTO: 42.8 % (ref 40.5–52.5)
HGB BLD-MCNC: 14.7 G/DL (ref 13.5–17.5)
LYMPHOCYTES # BLD: 1.1 K/UL (ref 1–5.1)
LYMPHOCYTES NFR BLD: 16.2 %
MCH RBC QN AUTO: 31.3 PG (ref 26–34)
MCHC RBC AUTO-ENTMCNC: 34.3 G/DL (ref 31–36)
MCV RBC AUTO: 91.1 FL (ref 80–100)
MONOCYTES # BLD: 0.7 K/UL (ref 0–1.3)
MONOCYTES NFR BLD: 9.6 %
NEUTROPHILS # BLD: 4.8 K/UL (ref 1.7–7.7)
NEUTROPHILS NFR BLD: 70.5 %
PLATELET # BLD AUTO: 153 K/UL (ref 135–450)
PMV BLD AUTO: 8.9 FL (ref 5–10.5)
POTASSIUM SERPL-SCNC: 3.1 MMOL/L (ref 3.5–5.1)
PROT SERPL-MCNC: 6.5 G/DL (ref 6.4–8.2)
RBC # BLD AUTO: 4.69 M/UL (ref 4.2–5.9)
SODIUM SERPL-SCNC: 140 MMOL/L (ref 136–145)
WBC # BLD AUTO: 6.8 K/UL (ref 4–11)

## 2024-10-28 NOTE — TELEPHONE ENCOUNTER
OPAT Nurse Coordinator Weekly Update Note    Current OPAT plan:  Zosyn 10.125 CI through 11/14/24      Diagnosis:  L foot infection      Assessment: spoke with pt.  He is doing well with IVATB.  No adverse SE noted  Pt is awaiting a call back from Dr Yates's office regarding wound vac.  [facility] nurse feels the would vac would be ineffective to one of the wounds on his foot and would like to discuss options     Follow up appts: Pt is calling today 6o schedule an appointment

## 2024-10-29 NOTE — TELEPHONE ENCOUNTER
Patient should be on IV pip/tazo 13.5g/24hr as CI.    K is slightly low- encourage K rich foods.   CRP up this week but ESR stable- will monitor.    Giovana Houston, PharmD, BCPS  Clinical Pharmacy Specialist- [facility] Infectious Disease  Phone: [phone] (also available on "[employer]  Fax: [phone]    For Pharmacy Admin Tracking Only    Program: Medical Group  CPA in place: Yes  Intervention Detail: Dose Adjustment: 1, reason: Therapy Optimization  Time Spent (min): 10

## 2024-10-29 NOTE — TELEPHONE ENCOUNTER
Spoke with pt  He will increase his dietary intake of K+ by eating more bananas, yogurt, potatoes, tomatoes, avocados; ect    Dapsone Counseling: I discussed with the patient the risks of dapsone including but not limited to hemolytic anemia, agranulocytosis, rashes, methemoglobinemia, kidney failure, peripheral neuropathy, headaches, GI upset, and liver toxicity.  Patients who start dapsone require monitoring including baseline LFTs and weekly CBCs for the first month, then every month thereafter.  The patient verbalized understanding of the proper use and possible adverse effects of dapsone.  All of the patient's questions and concerns were addressed.

## 2024-10-30 ENCOUNTER — TELEPHONE (OUTPATIENT)
Dept: INFECTIOUS DISEASES | Age: 74
End: 2024-10-30

## 2024-10-30 NOTE — TELEPHONE ENCOUNTER
Spoke with Giovana and Dr Green during OPAT rounds  Pt kidney function if stable   Dr Green increased Zosyn dose to 13.5gm CI with next shipment.    Spoke with Macey, pharmacist with [facility], and gave verbal order to increase Zosyn dose with next shipment to 13.5gm CI  Macey read back order correctly

## 2024-11-04 ENCOUNTER — TELEPHONE (OUTPATIENT)
Dept: INFECTIOUS DISEASES | Age: 74
End: 2024-11-04

## 2024-11-04 ENCOUNTER — TELEPHONE (OUTPATIENT)
Dept: INTERNAL MEDICINE CLINIC | Age: 74
End: 2024-11-04

## 2024-11-04 LAB
FUNGUS SPEC CULT: NORMAL
LOEFFLER MB STN SPEC: NORMAL

## 2024-11-04 NOTE — TELEPHONE ENCOUNTER
OPAT Nurse Coordinator Weekly Update Note    Current OPAT plan:  Zosyn 13.5gm CI through 11/14/24    Diagnosis:  L foot infection    Assessment:  spoke with pt.  He states he is doing well.  L ft is \"getting better\".  Updated pt on increase in Zosyn dose.  Let him know it isn't because anything in wrong, we were watching his kidney function and since it was doing well, we increased the dose for the last days of his infusion.  I asked pt to contact this office if any adverse SE pop up - GI symptoms NVD.  Pt verbalized understanding      Follow up appts:

## 2024-11-04 NOTE — TELEPHONE ENCOUNTER
Dr. Preethi Gonzales post-op appointment  (Newest Message First)  View All Conversations on this Encounter  Dominick Aguayo \"Dominick Aguayo\"  P [facility] [place] [address] Practice Support (supporting Van Egan MD)5 hours ago (9:22 AM)     TP  Just a heads up FYI.   No reply is necessary.   I have a post-op   appointment with   Dr. Preethi alvarez, Tuesday 11/05/24 at 2:30 PM

## 2024-11-05 ENCOUNTER — OFFICE VISIT (OUTPATIENT)
Dept: VASCULAR SURGERY | Age: 74
End: 2024-11-05
Payer: MEDICARE

## 2024-11-05 VITALS — SYSTOLIC BLOOD PRESSURE: 141 MMHG | HEART RATE: 63 BPM | DIASTOLIC BLOOD PRESSURE: 83 MMHG

## 2024-11-05 DIAGNOSIS — I73.9 PAD (PERIPHERAL ARTERY DISEASE) (HCC): Primary | ICD-10-CM

## 2024-11-05 DIAGNOSIS — L97.529 ULCER OF LEFT FOOT, UNSPECIFIED ULCER STAGE (HCC): ICD-10-CM

## 2024-11-05 PROBLEM — K44.9 HIATAL HERNIA: Status: RESOLVED | Noted: 2021-10-05 | Resolved: 2024-11-05

## 2024-11-05 PROBLEM — Z87.2 HISTORY OF ULCER OF LOWER EXTREMITY: Status: RESOLVED | Noted: 2017-01-20 | Resolved: 2024-11-05

## 2024-11-05 PROBLEM — G89.29 CHRONIC PAIN OF LEFT KNEE: Status: RESOLVED | Noted: 2019-05-09 | Resolved: 2024-11-05

## 2024-11-05 PROBLEM — N17.9 AKI (ACUTE KIDNEY INJURY) (HCC): Status: RESOLVED | Noted: 2017-09-28 | Resolved: 2024-11-05

## 2024-11-05 PROBLEM — M17.12 PRIMARY OSTEOARTHRITIS OF LEFT KNEE: Status: RESOLVED | Noted: 2019-05-02 | Resolved: 2024-11-05

## 2024-11-05 PROBLEM — M25.562 CHRONIC PAIN OF LEFT KNEE: Status: RESOLVED | Noted: 2019-05-09 | Resolved: 2024-11-05

## 2024-11-05 PROBLEM — D69.6 THROMBOCYTOPENIA, UNSPECIFIED (HCC): Status: RESOLVED | Noted: 2021-10-20 | Resolved: 2024-11-05

## 2024-11-05 LAB
ALBUMIN SERPL-MCNC: 4.1 G/DL (ref 3.4–5)
ALBUMIN/GLOB SERPL: 2.1 {RATIO} (ref 1.1–2.2)
ALP SERPL-CCNC: 77 U/L (ref 40–129)
ALT SERPL-CCNC: 39 U/L (ref 10–40)
ANION GAP SERPL CALCULATED.3IONS-SCNC: 14 MMOL/L (ref 3–16)
AST SERPL-CCNC: 31 U/L (ref 15–37)
BASOPHILS # BLD: 0 K/UL (ref 0–0.2)
BASOPHILS NFR BLD: 0.7 %
BILIRUB SERPL-MCNC: 0.3 MG/DL (ref 0–1)
BUN SERPL-MCNC: 29 MG/DL (ref 7–20)
CALCIUM SERPL-MCNC: 9.3 MG/DL (ref 8.3–10.6)
CHLORIDE SERPL-SCNC: 96 MMOL/L (ref 99–110)
CO2 SERPL-SCNC: 26 MMOL/L (ref 21–32)
CREAT SERPL-MCNC: 1.1 MG/DL (ref 0.8–1.3)
CRP SERPL-MCNC: 44.2 MG/L (ref 0–5.1)
DEPRECATED RDW RBC AUTO: 15.9 % (ref 12.4–15.4)
EOSINOPHIL # BLD: 0.2 K/UL (ref 0–0.6)
EOSINOPHIL NFR BLD: 3.8 %
ERYTHROCYTE [SEDIMENTATION RATE] IN BLOOD BY WESTERGREN METHOD: 23 MM/HR (ref 0–20)
GFR SERPLBLD CREATININE-BSD FMLA CKD-EPI: 70 ML/MIN/{1.73_M2}
GLUCOSE SERPL-MCNC: 167 MG/DL (ref 70–99)
HCT VFR BLD AUTO: 42.8 % (ref 40.5–52.5)
HGB BLD-MCNC: 14.4 G/DL (ref 13.5–17.5)
LYMPHOCYTES # BLD: 0.9 K/UL (ref 1–5.1)
LYMPHOCYTES NFR BLD: 18.9 %
MCH RBC QN AUTO: 30.5 PG (ref 26–34)
MCHC RBC AUTO-ENTMCNC: 33.6 G/DL (ref 31–36)
MCV RBC AUTO: 90.7 FL (ref 80–100)
MONOCYTES # BLD: 0.4 K/UL (ref 0–1.3)
MONOCYTES NFR BLD: 7.8 %
NEUTROPHILS # BLD: 3.3 K/UL (ref 1.7–7.7)
NEUTROPHILS NFR BLD: 68.8 %
PLATELET # BLD AUTO: 172 K/UL (ref 135–450)
PMV BLD AUTO: 8.2 FL (ref 5–10.5)
POTASSIUM SERPL-SCNC: 3.3 MMOL/L (ref 3.5–5.1)
PROT SERPL-MCNC: 6.1 G/DL (ref 6.4–8.2)
RBC # BLD AUTO: 4.72 M/UL (ref 4.2–5.9)
SODIUM SERPL-SCNC: 136 MMOL/L (ref 136–145)
WBC # BLD AUTO: 4.8 K/UL (ref 4–11)

## 2024-11-05 PROCEDURE — 1159F MED LIST DOCD IN RCRD: CPT | Performed by: SURGERY

## 2024-11-05 PROCEDURE — 3079F DIAST BP 80-89 MM HG: CPT | Performed by: SURGERY

## 2024-11-05 PROCEDURE — 1123F ACP DISCUSS/DSCN MKR DOCD: CPT | Performed by: SURGERY

## 2024-11-05 PROCEDURE — 3017F COLORECTAL CA SCREEN DOC REV: CPT | Performed by: SURGERY

## 2024-11-05 PROCEDURE — 1036F TOBACCO NON-USER: CPT | Performed by: SURGERY

## 2024-11-05 PROCEDURE — G8427 DOCREV CUR MEDS BY ELIG CLIN: HCPCS | Performed by: SURGERY

## 2024-11-05 PROCEDURE — 99213 OFFICE O/P EST LOW 20 MIN: CPT | Performed by: SURGERY

## 2024-11-05 PROCEDURE — 3077F SYST BP >= 140 MM HG: CPT | Performed by: SURGERY

## 2024-11-05 PROCEDURE — G8484 FLU IMMUNIZE NO ADMIN: HCPCS | Performed by: SURGERY

## 2024-11-05 PROCEDURE — G8417 CALC BMI ABV UP PARAM F/U: HCPCS | Performed by: SURGERY

## 2024-11-05 NOTE — PROGRESS NOTES
2024     Dominick Aguayo (:  1950) is a 74 y.o. male,Established patient, here for evaluation of the following chief complaint(s):  Follow-up (Hospital Follow up angiography )      ASSESSMENT/PLAN:  1. PAD (peripheral artery disease) (HCC)  2. Ulcer of left foot, unspecified ulcer stage (HCC)    Doing well following arteriogram.  Repeat duplex in 6-8 weeks.     No follow-ups on file.       SUBJECTIVE/OBJECTIVE:  POV#1 left anterior tibial and posterior tibial artery angioplasty 2024.  Continues follow up with Dr. Yates.  Left foot seems to be healing well.   No complaints following procedure.  Left foot is in surgical dressing.  Left leg is warm and well-perfused with no swelling.   Right femoral pulse 2+ with no hematoma or swelling.     Physical Exam  Vitals:    24 1430   BP: (!) 141/83   Pulse: 63       An electronic signature was used to authenticate this note.    --Preethi Ibrahim MD

## 2024-11-11 ENCOUNTER — TELEPHONE (OUTPATIENT)
Dept: INFECTIOUS DISEASES | Age: 74
End: 2024-11-11

## 2024-11-11 LAB
ALBUMIN SERPL-MCNC: 4.2 G/DL (ref 3.4–5)
ALBUMIN/GLOB SERPL: 2.1 {RATIO} (ref 1.1–2.2)
ALP SERPL-CCNC: 66 U/L (ref 40–129)
ALT SERPL-CCNC: 41 U/L (ref 10–40)
ANION GAP SERPL CALCULATED.3IONS-SCNC: 9 MMOL/L (ref 3–16)
AST SERPL-CCNC: 35 U/L (ref 15–37)
BILIRUB SERPL-MCNC: 0.4 MG/DL (ref 0–1)
BUN SERPL-MCNC: 31 MG/DL (ref 7–20)
CALCIUM SERPL-MCNC: 9.4 MG/DL (ref 8.3–10.6)
CHLORIDE SERPL-SCNC: 95 MMOL/L (ref 99–110)
CO2 SERPL-SCNC: 28 MMOL/L (ref 21–32)
CREAT SERPL-MCNC: 1.1 MG/DL (ref 0.8–1.3)
CRP SERPL-MCNC: 11.9 MG/L (ref 0–5.1)
GFR SERPLBLD CREATININE-BSD FMLA CKD-EPI: 70 ML/MIN/{1.73_M2}
GLUCOSE SERPL-MCNC: 90 MG/DL (ref 70–99)
POTASSIUM SERPL-SCNC: 3.6 MMOL/L (ref 3.5–5.1)
PROT SERPL-MCNC: 6.2 G/DL (ref 6.4–8.2)
SODIUM SERPL-SCNC: 132 MMOL/L (ref 136–145)

## 2024-11-11 NOTE — TELEPHONE ENCOUNTER
Pt left vm stating a 90 day supply of oral antibiotic must be ordered for Medicare to approve for home delivery

## 2024-11-11 NOTE — TELEPHONE ENCOUNTER
Tentative plan for PO Augmentin after IV Zosyn is complete for chronic OM.     Patient had rash with Unasyn in the hospital but patient has had Augmentin in 2016 and 2023 and is willing to try this again for PO suppression against Efaecalis, strep, and prevotella.     Will send rx but will still need to confer with Dr. Green and podiatry on when we will end IV. Should not start taking PO until we determine IV plan. Patient v/u.     [facility] RN has been measuring the wound and it has been \"shrinking.\"   Patient is reassured by this.   The wound vac seems to be working well.

## 2024-11-11 NOTE — TELEPHONE ENCOUNTER
OPAT Nurse Coordinator Weekly Update Note    Current OPAT plan:      Diagnosis:      Assessment:  feels good.  Nwb foot healing slower than he would like and now has a wound vac.  He saw Dr Yates last week and does not need to see pt again for a few more weeks.  Pt states Dr Yates is pleased with progress.  Pt is asking if any oral atb are ordered, to please send them early.  Pt uses a home delivery pharmacy and it takes a few days to get the meds       Follow up appts: Dr. Yates \"in a few weeks\"

## 2024-11-12 LAB
REASON FOR REJECTION: NORMAL
REJECTED TEST: NORMAL

## 2024-11-12 RX ORDER — AMOXICILLIN AND CLAVULANATE POTASSIUM 500; 125 MG/1; MG/1
1 TABLET, FILM COATED ORAL 2 TIMES DAILY
Qty: 180 TABLET | Refills: 3 | Status: SHIPPED | OUTPATIENT
Start: 2024-11-12 | End: 2025-02-10

## 2024-11-12 NOTE — TELEPHONE ENCOUNTER
Spoke with Dr Green and he verbally ordered Augmentin 500mg BI for life to start after completing IV ATB on 11/14/24.  VO also given to Pull PICC after last dose on 11/14.  Order placed as a 90 day supply to home delivery pharmacy

## 2024-11-12 NOTE — TELEPHONE ENCOUNTER
Received call from Carmen with [facility]  Carmen stated the CBC tube of blood was clotted  Pt is set to term this week.  Did not request a redraw of CBC

## 2024-11-13 NOTE — TELEPHONE ENCOUNTER
Contacted Cruz, pharmacist with [facility], and gave verbal order to end IV ATB and Pull PICC after 11/14 dose  Cruz verbalized understanding    11/14  Left vm for pt asking for a return call to schedule a f/u with Dr Green in February.   My name, title, Dr Green's name,   Pt name   My direct number [phone]    F/U is dt to pt being on oral suppressioin - how is pt feeling/tolerating the medication    11/21  Spoke with pt.  [facility] nurse removed PICC line yesterday during visit

## 2024-12-06 ENCOUNTER — TELEPHONE (OUTPATIENT)
Dept: VASCULAR SURGERY | Age: 74
End: 2024-12-06

## 2024-12-06 NOTE — TELEPHONE ENCOUNTER
Spoke with Mr. Aguayo to reschedule his 12/31 aptt for duplex scan and office visit. At this time he was unable to reschedule d/t work so he stated he will call after the first of the year and reschedule them.   14-Jul-2019 16:14

## 2024-12-17 RX ORDER — ATENOLOL AND CHLORTHALIDONE TABLET 50; 25 MG/1; MG/1
1 TABLET ORAL DAILY
Qty: 90 TABLET | Refills: 1 | Status: SHIPPED | OUTPATIENT
Start: 2024-12-17

## 2025-03-21 ENCOUNTER — TELEPHONE (OUTPATIENT)
Dept: INFECTIOUS DISEASES | Age: 75
End: 2025-03-21

## 2025-03-21 DIAGNOSIS — R31.9 HEMATURIA, UNSPECIFIED TYPE: Primary | ICD-10-CM

## 2025-03-21 NOTE — TELEPHONE ENCOUNTER
Pt called and left vm on this RN line  Pt states his PCP is out and he is \"leaning on us\"  Pt states he is \"peeing clumps of blood\" and \"they directed me to urgent care, but I'm not comfortable with that until I speak with you\"  Pt was an OPAT pt in November 2024    Contacted pt  Blood proceeds stream of urine.  Pt has no pain, no fevers, no chills.  Pt not on anticoag except an aspirin, pt has not flank pain nor history of kidney stones.  I ordered UA with reflex for pt - home care nurse in home now  Pt is currently on Augmentin per Dr Green for L foot Infection  Pt was advised to go to ED IF - gross blood in urine, urine becomes very dark (pt states urine is most always clear), fevers, chills.  Also advised pt to see one of Dr Egan's partners for a possible urology consult should the bleeding persist.  Pt verbalized understanding    Yue, [facility] nurse will collect UA today  Lemhi [facility] contacted with VO for UA- spoke with Wendy

## 2025-03-22 LAB
BACTERIA URNS QL MICRO: ABNORMAL /HPF
BILIRUB UR QL STRIP.AUTO: ABNORMAL
CHARACTER UR: ABNORMAL
CLARITY UR: ABNORMAL
COLOR UR: ABNORMAL
EPI CELLS #/AREA URNS HPF: ABNORMAL /HPF (ref 0–5)
GLUCOSE UR STRIP.AUTO-MCNC: ABNORMAL MG/DL
HGB UR QL STRIP.AUTO: ABNORMAL
KETONES UR STRIP.AUTO-MCNC: ABNORMAL MG/DL
LEUKOCYTE ESTERASE UR QL STRIP.AUTO: ABNORMAL
NITRITE UR QL STRIP.AUTO: ABNORMAL
PH UR STRIP.AUTO: ABNORMAL [PH] (ref 5–8)
PROT UR STRIP.AUTO-MCNC: ABNORMAL MG/DL
RBC #/AREA URNS HPF: >100 /HPF (ref 0–4)
SP GR UR STRIP.AUTO: ABNORMAL (ref 1–1.03)
UA COMPLETE W REFLEX CULTURE PNL UR: NO
UA DIPSTICK W REFLEX MICRO PNL UR: YES
URN SPEC COLLECT METH UR: ABNORMAL
UROBILINOGEN UR STRIP-ACNC: ABNORMAL E.U./DL
WBC #/AREA URNS HPF: ABNORMAL /HPF (ref 0–5)

## 2025-03-24 ENCOUNTER — RESULTS FOLLOW-UP (OUTPATIENT)
Dept: INFECTIOUS DISEASES | Age: 75
End: 2025-03-24

## 2025-03-24 NOTE — TELEPHONE ENCOUNTER
Called pt --  Urine with blood, had 'blob' on Fri  Continued Fri / Sat and stopped.    UA with RBC    He should have refer to Urology from PCP.    No urgent as long as no active bleeding    Separate issues -   L foot stump with pinhole wound

## 2025-04-15 ASSESSMENT — PATIENT HEALTH QUESTIONNAIRE - PHQ9
2. FEELING DOWN, DEPRESSED OR HOPELESS: NOT AT ALL
SUM OF ALL RESPONSES TO PHQ QUESTIONS 1-9: 0
1. LITTLE INTEREST OR PLEASURE IN DOING THINGS: NOT AT ALL
2. FEELING DOWN, DEPRESSED OR HOPELESS: NOT AT ALL
1. LITTLE INTEREST OR PLEASURE IN DOING THINGS: NOT AT ALL
SUM OF ALL RESPONSES TO PHQ9 QUESTIONS 1 & 2: 0
SUM OF ALL RESPONSES TO PHQ QUESTIONS 1-9: 0

## 2025-04-18 ENCOUNTER — OFFICE VISIT (OUTPATIENT)
Dept: INTERNAL MEDICINE CLINIC | Age: 75
End: 2025-04-18

## 2025-04-18 VITALS — DIASTOLIC BLOOD PRESSURE: 80 MMHG | SYSTOLIC BLOOD PRESSURE: 140 MMHG

## 2025-04-18 DIAGNOSIS — I10 ESSENTIAL HYPERTENSION: ICD-10-CM

## 2025-04-18 DIAGNOSIS — Z89.431 PARTIAL NONTRAUMATIC AMPUTATION OF FOOT, RIGHT (HCC): ICD-10-CM

## 2025-04-18 DIAGNOSIS — L97.529 ULCER OF LEFT FOOT, UNSPECIFIED ULCER STAGE (HCC): ICD-10-CM

## 2025-04-18 DIAGNOSIS — B18.2 HEPATITIS C CARRIER (HCC): ICD-10-CM

## 2025-04-18 DIAGNOSIS — R31.9 HEMATURIA, UNSPECIFIED TYPE: Primary | ICD-10-CM

## 2025-04-18 RX ORDER — OMEPRAZOLE 40 MG/1
40 CAPSULE, DELAYED RELEASE ORAL
Qty: 90 CAPSULE | Refills: 3 | Status: SHIPPED | OUTPATIENT
Start: 2025-04-18

## 2025-04-18 RX ORDER — ATENOLOL AND CHLORTHALIDONE TABLET 50; 25 MG/1; MG/1
1 TABLET ORAL DAILY
Qty: 90 TABLET | Refills: 1 | Status: SHIPPED | OUTPATIENT
Start: 2025-04-18

## 2025-04-18 RX ORDER — SILDENAFIL 100 MG/1
100 TABLET, FILM COATED ORAL PRN
Qty: 10 TABLET | Refills: 3 | Status: SHIPPED | OUTPATIENT
Start: 2025-04-18

## 2025-04-18 NOTE — PROGRESS NOTES
CHIEF COMPLAINT: Dominick Aguayo is a 74 y.o. male who presents for : Post hematuria 2 weeks ago    HPI: Patient presented with gross hematuria 2 weeks ago this lasted 2 days he did have a UA which showed predominantly red cells but 3+ bacteria he has been on Augmentin now stopped for his foot anyway said no fevers chills or any other send he is now asymptomatic    Review of Systems:   Constitutional:  Denies fever or chills   Eyes:  Denies change in visual acuity   HENT:  Denies nasal congestion or sore throat   Respiratory:  Denies cough or shortness of breath   Cardiovascular:  Denies chest pain or edema   GI:  Denies abdominal pain, nausea, vomiting, bloody stools or diarrhea   :  Denies dysuria   Musculoskeletal:  Denies back pain or joint pain   Integument:  Denies rash   Neurologic:  Denies headache, focal weakness or sensory changes   Endocrine:  Denies polyuria or polydipsia   Lymphatic:  Denies swollen glands   Psychiatric:  Denies depression or anxiety     Past Medical History:        Diagnosis Date    Amputation of foot (HCC) 03/12/2024    Chest pain 01/02/2015    Chronic GERD     ESBL (extended spectrum beta-lactamase) producing bacteria infection 09/11/2016    E.coli ESBL-8/2015    Gastroesophageal reflux disease without esophagitis 03/12/2024    H/O gastric bypass 03/12/2024    Hepatitis C virus 01/02/2015    says he received treatment    Hx of hepatitis C 01/16/2015    Hyperlipidemia     Hypertension     Hypertension     Hypertension, essential     MDRO (multiple drug resistant organisms) resistance 09/10/2016    Pseudomonas MDRO in foot 6/23/16    MDRO (multiple drug resistant organisms) resistance 9/22/17; 09/08/2017    Pseudomonas MDR in wound    Morbid obesity     Non-pressure chronic ulcer of other part of left foot limited to breakdown of skin (HCC)     Non-pressure chronic ulcer of other part of right foot limited to breakdown of skin (HCC)     Obstructive sleep apnea     Partial

## 2025-04-22 ENCOUNTER — CLINICAL SUPPORT (OUTPATIENT)
Dept: INTERNAL MEDICINE CLINIC | Age: 75
End: 2025-04-22
Payer: MEDICARE

## 2025-04-22 DIAGNOSIS — R50.9 FEVER AND CHILLS: Primary | ICD-10-CM

## 2025-04-22 LAB
BILIRUBIN, POC: NORMAL
BLOOD URINE, POC: NORMAL
CLARITY, POC: CLEAR
COLOR, POC: YELLOW
GLUCOSE URINE, POC: NORMAL MG/DL
KETONES, POC: NORMAL MG/DL
LEUKOCYTE EST, POC: NORMAL
NITRITE, POC: NORMAL
PH, POC: 6
PROTEIN, POC: NORMAL MG/DL
SPECIFIC GRAVITY, POC: 1.01
UROBILINOGEN, POC: 0.2 MG/DL

## 2025-04-22 PROCEDURE — 81002 URINALYSIS NONAUTO W/O SCOPE: CPT | Performed by: INTERNAL MEDICINE

## 2025-04-23 ENCOUNTER — APPOINTMENT (OUTPATIENT)
Dept: MRI IMAGING | Age: 75
End: 2025-04-23
Payer: MEDICARE

## 2025-04-23 ENCOUNTER — APPOINTMENT (OUTPATIENT)
Dept: GENERAL RADIOLOGY | Age: 75
End: 2025-04-23
Payer: MEDICARE

## 2025-04-23 ENCOUNTER — HOSPITAL ENCOUNTER (INPATIENT)
Age: 75
LOS: 3 days | Discharge: HOME | End: 2025-04-26
Attending: EMERGENCY MEDICINE | Admitting: HOSPITALIST
Payer: MEDICARE

## 2025-04-23 DIAGNOSIS — L08.9 RIGHT FOOT INFECTION: Primary | ICD-10-CM

## 2025-04-23 DIAGNOSIS — E87.6 HYPOKALEMIA: ICD-10-CM

## 2025-04-23 PROBLEM — L03.115 CELLULITIS OF RIGHT FOOT: Status: ACTIVE | Noted: 2025-04-23

## 2025-04-23 LAB
ANION GAP SERPL CALCULATED.3IONS-SCNC: 15 MMOL/L (ref 3–16)
BASOPHILS # BLD: 0.1 K/UL (ref 0–0.2)
BASOPHILS NFR BLD: 0.5 %
BUN SERPL-MCNC: 23 MG/DL (ref 7–20)
CALCIUM SERPL-MCNC: 9.5 MG/DL (ref 8.3–10.6)
CHLORIDE SERPL-SCNC: 96 MMOL/L (ref 99–110)
CO2 SERPL-SCNC: 25 MMOL/L (ref 21–32)
CREAT SERPL-MCNC: 1.3 MG/DL (ref 0.8–1.3)
CRP SERPL-MCNC: 292 MG/L (ref 0–5.1)
DEPRECATED RDW RBC AUTO: 14.6 % (ref 12.4–15.4)
EOSINOPHIL # BLD: 0.1 K/UL (ref 0–0.6)
EOSINOPHIL NFR BLD: 1.1 %
ERYTHROCYTE [SEDIMENTATION RATE] IN BLOOD BY WESTERGREN METHOD: 40 MM/HR (ref 0–20)
GFR SERPLBLD CREATININE-BSD FMLA CKD-EPI: 57 ML/MIN/{1.73_M2}
GLUCOSE SERPL-MCNC: 115 MG/DL (ref 70–99)
HCT VFR BLD AUTO: 51.8 % (ref 40.5–52.5)
HGB BLD-MCNC: 17.6 G/DL (ref 13.5–17.5)
LACTATE BLDV-SCNC: 1 MMOL/L (ref 0.4–2)
LYMPHOCYTES # BLD: 1.2 K/UL (ref 1–5.1)
LYMPHOCYTES NFR BLD: 11.3 %
MAGNESIUM SERPL-MCNC: 1.83 MG/DL (ref 1.8–2.4)
MCH RBC QN AUTO: 30.4 PG (ref 26–34)
MCHC RBC AUTO-ENTMCNC: 34 G/DL (ref 31–36)
MCV RBC AUTO: 89.5 FL (ref 80–100)
MONOCYTES # BLD: 0.9 K/UL (ref 0–1.3)
MONOCYTES NFR BLD: 7.8 %
NEUTROPHILS # BLD: 8.7 K/UL (ref 1.7–7.7)
NEUTROPHILS NFR BLD: 79.3 %
PLATELET # BLD AUTO: 128 K/UL (ref 135–450)
PMV BLD AUTO: 9.6 FL (ref 5–10.5)
POTASSIUM SERPL-SCNC: 3.3 MMOL/L (ref 3.5–5.1)
RBC # BLD AUTO: 5.78 M/UL (ref 4.2–5.9)
SODIUM SERPL-SCNC: 136 MMOL/L (ref 136–145)
WBC # BLD AUTO: 11 K/UL (ref 4–11)

## 2025-04-23 PROCEDURE — 87040 BLOOD CULTURE FOR BACTERIA: CPT

## 2025-04-23 PROCEDURE — 6360000004 HC RX CONTRAST MEDICATION

## 2025-04-23 PROCEDURE — 1200000000 HC SEMI PRIVATE

## 2025-04-23 PROCEDURE — 02HV33Z INSERTION OF INFUSION DEVICE INTO SUPERIOR VENA CAVA, PERCUTANEOUS APPROACH: ICD-10-PCS | Performed by: EMERGENCY MEDICINE

## 2025-04-23 PROCEDURE — 99285 EMERGENCY DEPT VISIT HI MDM: CPT

## 2025-04-23 PROCEDURE — 73630 X-RAY EXAM OF FOOT: CPT

## 2025-04-23 PROCEDURE — 80048 BASIC METABOLIC PNL TOTAL CA: CPT

## 2025-04-23 PROCEDURE — 6360000002 HC RX W HCPCS: Performed by: EMERGENCY MEDICINE

## 2025-04-23 PROCEDURE — 83735 ASSAY OF MAGNESIUM: CPT

## 2025-04-23 PROCEDURE — 2580000003 HC RX 258: Performed by: EMERGENCY MEDICINE

## 2025-04-23 PROCEDURE — 85025 COMPLETE CBC W/AUTO DIFF WBC: CPT

## 2025-04-23 PROCEDURE — 85652 RBC SED RATE AUTOMATED: CPT

## 2025-04-23 PROCEDURE — 83605 ASSAY OF LACTIC ACID: CPT

## 2025-04-23 PROCEDURE — 86140 C-REACTIVE PROTEIN: CPT

## 2025-04-23 PROCEDURE — A9576 INJ PROHANCE MULTIPACK: HCPCS

## 2025-04-23 PROCEDURE — 96375 TX/PRO/DX INJ NEW DRUG ADDON: CPT

## 2025-04-23 PROCEDURE — 73720 MRI LWR EXTREMITY W/O&W/DYE: CPT

## 2025-04-23 PROCEDURE — 96365 THER/PROPH/DIAG IV INF INIT: CPT

## 2025-04-23 RX ORDER — SODIUM CHLORIDE 9 MG/ML
INJECTION, SOLUTION INTRAVENOUS PRN
Status: DISCONTINUED | OUTPATIENT
Start: 2025-04-23 | End: 2025-04-26 | Stop reason: HOSPADM

## 2025-04-23 RX ORDER — POLYETHYLENE GLYCOL 3350 17 G/17G
17 POWDER, FOR SOLUTION ORAL DAILY PRN
Status: DISCONTINUED | OUTPATIENT
Start: 2025-04-23 | End: 2025-04-26 | Stop reason: HOSPADM

## 2025-04-23 RX ORDER — PANTOPRAZOLE SODIUM 40 MG/1
40 TABLET, DELAYED RELEASE ORAL
Status: DISCONTINUED | OUTPATIENT
Start: 2025-04-24 | End: 2025-04-26 | Stop reason: HOSPADM

## 2025-04-23 RX ORDER — SODIUM CHLORIDE 0.9 % (FLUSH) 0.9 %
5-40 SYRINGE (ML) INJECTION PRN
Status: DISCONTINUED | OUTPATIENT
Start: 2025-04-23 | End: 2025-04-26 | Stop reason: HOSPADM

## 2025-04-23 RX ORDER — ASPIRIN 81 MG/1
81 TABLET ORAL DAILY
COMMUNITY

## 2025-04-23 RX ORDER — ONDANSETRON 4 MG/1
4 TABLET, ORALLY DISINTEGRATING ORAL EVERY 8 HOURS PRN
Status: DISCONTINUED | OUTPATIENT
Start: 2025-04-23 | End: 2025-04-26 | Stop reason: HOSPADM

## 2025-04-23 RX ORDER — ACETAMINOPHEN 325 MG/1
650 TABLET ORAL EVERY 6 HOURS PRN
Status: DISCONTINUED | OUTPATIENT
Start: 2025-04-23 | End: 2025-04-26 | Stop reason: HOSPADM

## 2025-04-23 RX ORDER — ASPIRIN 325 MG
325 TABLET ORAL DAILY
Status: DISCONTINUED | OUTPATIENT
Start: 2025-04-24 | End: 2025-04-26 | Stop reason: HOSPADM

## 2025-04-23 RX ORDER — ENOXAPARIN SODIUM 100 MG/ML
30 INJECTION SUBCUTANEOUS 2 TIMES DAILY
Status: DISCONTINUED | OUTPATIENT
Start: 2025-04-24 | End: 2025-04-26 | Stop reason: HOSPADM

## 2025-04-23 RX ORDER — ONDANSETRON 2 MG/ML
4 INJECTION INTRAMUSCULAR; INTRAVENOUS EVERY 6 HOURS PRN
Status: DISCONTINUED | OUTPATIENT
Start: 2025-04-23 | End: 2025-04-26 | Stop reason: HOSPADM

## 2025-04-23 RX ORDER — M-VIT,TX,IRON,MINS/CALC/FOLIC 27MG-0.4MG
1 TABLET ORAL DAILY
COMMUNITY

## 2025-04-23 RX ORDER — ATENOLOL AND CHLORTHALIDONE TABLET 50; 25 MG/1; MG/1
1 TABLET ORAL DAILY
Status: DISCONTINUED | OUTPATIENT
Start: 2025-04-24 | End: 2025-04-24

## 2025-04-23 RX ORDER — AMOXICILLIN AND CLAVULANATE POTASSIUM 500; 125 MG/1; MG/1
1 TABLET, FILM COATED ORAL DAILY
Status: ON HOLD | COMMUNITY
End: 2025-04-25 | Stop reason: HOSPADM

## 2025-04-23 RX ORDER — ACETAMINOPHEN 650 MG/1
650 SUPPOSITORY RECTAL EVERY 6 HOURS PRN
Status: DISCONTINUED | OUTPATIENT
Start: 2025-04-23 | End: 2025-04-26 | Stop reason: HOSPADM

## 2025-04-23 RX ORDER — SODIUM CHLORIDE 0.9 % (FLUSH) 0.9 %
5-40 SYRINGE (ML) INJECTION EVERY 12 HOURS SCHEDULED
Status: DISCONTINUED | OUTPATIENT
Start: 2025-04-24 | End: 2025-04-26 | Stop reason: HOSPADM

## 2025-04-23 RX ADMIN — PIPERACILLIN AND TAZOBACTAM 3375 MG: 3; .375 INJECTION, POWDER, LYOPHILIZED, FOR SOLUTION INTRAVENOUS at 19:42

## 2025-04-23 RX ADMIN — GADOTERIDOL 20 ML: 279.3 INJECTION, SOLUTION INTRAVENOUS at 23:46

## 2025-04-23 RX ADMIN — VANCOMYCIN HYDROCHLORIDE 2500 MG: 10 INJECTION, POWDER, LYOPHILIZED, FOR SOLUTION INTRAVENOUS at 20:17

## 2025-04-23 ASSESSMENT — LIFESTYLE VARIABLES
HOW OFTEN DO YOU HAVE A DRINK CONTAINING ALCOHOL: NEVER
HOW MANY STANDARD DRINKS CONTAINING ALCOHOL DO YOU HAVE ON A TYPICAL DAY: PATIENT DOES NOT DRINK

## 2025-04-23 ASSESSMENT — ENCOUNTER SYMPTOMS
SORE THROAT: 0
COUGH: 0
GASTROINTESTINAL NEGATIVE: 1
SHORTNESS OF BREATH: 0
RESPIRATORY NEGATIVE: 1

## 2025-04-23 ASSESSMENT — PAIN - FUNCTIONAL ASSESSMENT: PAIN_FUNCTIONAL_ASSESSMENT: NONE - DENIES PAIN

## 2025-04-23 NOTE — ED PROVIDER NOTES
*Care was provided during a period of multiple hospital medication shortages, including a nationwide IV fluid shortage due to natural disasters*    [facility]  EMERGENCY DEPARTMENT ENCOUNTER          ATTENDING PHYSICIAN NOTE       Date of evaluation: 4/23/2025    Chief Complaint     Wound Infection      History of Present Illness     Dominick Aguayo is a 74 y.o. 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  States over the past 3 days or so he has noticed some intermittent fevers/chills and also noticed some redness and warmth to his right foot.  He has a baseline no sensation in his feet but does check them daily with a mirror.  Yesterday he noticed a small wound to the plantar forefoot and was seen in podiatry clinic this evening for evaluation.  They referred him here for further treatment of this wound and changes to his foot.  He denies any known injury.  Denies pain.  Does state the fever/chills have resolved over the last 1 to 2 days.  No changes to the contralateral foot.    Physical Exam     INITIAL VITALS: BP: (!) 166/80, Temp: 98 °F (36.7 °C), Pulse: 88, Respirations: 16, SpO2: 96 %     Physical Exam    Nursing note and vitals reviewed.    General:  Adult male, alert and appropriately interactive. In no distress.  HENT: Normocephalic and atraumatic. External ears normal. Nose appears normal externally.  Eyes: Conjunctivae normal. No scleral icterus.  Neck: Neck supple. No tracheal deviation present.   CV: Normal rate. Regular rhythm.   Pulm: Effort normal on RA.   Musculoskeletal: Bilateral TMA's noted with right foot mildly edematous and significantly erythematous and warm compared to the left.  There is a small punctate but dusky wound to the medial forefoot without drainage, focal fluctuance, or induration.  Neurological: Alert and

## 2025-04-24 ENCOUNTER — TELEPHONE (OUTPATIENT)
Dept: INTERNAL MEDICINE CLINIC | Age: 75
End: 2025-04-24

## 2025-04-24 PROBLEM — L02.611 ABSCESS OF RIGHT FOOT: Status: ACTIVE | Noted: 2025-04-24

## 2025-04-24 PROBLEM — E87.6 HYPOKALEMIA: Status: ACTIVE | Noted: 2025-04-24

## 2025-04-24 LAB
ANION GAP SERPL CALCULATED.3IONS-SCNC: 10 MMOL/L (ref 3–16)
BASOPHILS # BLD: 0 K/UL (ref 0–0.2)
BASOPHILS NFR BLD: 0.5 %
BUN SERPL-MCNC: 21 MG/DL (ref 7–20)
CALCIUM SERPL-MCNC: 9.1 MG/DL (ref 8.3–10.6)
CHLORIDE SERPL-SCNC: 97 MMOL/L (ref 99–110)
CO2 SERPL-SCNC: 27 MMOL/L (ref 21–32)
CREAT SERPL-MCNC: 1.2 MG/DL (ref 0.8–1.3)
DEPRECATED RDW RBC AUTO: 14.9 % (ref 12.4–15.4)
EOSINOPHIL # BLD: 0.1 K/UL (ref 0–0.6)
EOSINOPHIL NFR BLD: 1.7 %
GFR SERPLBLD CREATININE-BSD FMLA CKD-EPI: 63 ML/MIN/{1.73_M2}
GLUCOSE SERPL-MCNC: 132 MG/DL (ref 70–99)
HCT VFR BLD AUTO: 47.3 % (ref 40.5–52.5)
HGB BLD-MCNC: 16.3 G/DL (ref 13.5–17.5)
LYMPHOCYTES # BLD: 0.4 K/UL (ref 1–5.1)
LYMPHOCYTES NFR BLD: 5.6 %
MCH RBC QN AUTO: 30.2 PG (ref 26–34)
MCHC RBC AUTO-ENTMCNC: 34.3 G/DL (ref 31–36)
MCV RBC AUTO: 88 FL (ref 80–100)
MONOCYTES # BLD: 0.6 K/UL (ref 0–1.3)
MONOCYTES NFR BLD: 8 %
NEUTROPHILS # BLD: 6.3 K/UL (ref 1.7–7.7)
NEUTROPHILS NFR BLD: 84.2 %
PLATELET # BLD AUTO: 103 K/UL (ref 135–450)
PMV BLD AUTO: 8.8 FL (ref 5–10.5)
POTASSIUM SERPL-SCNC: 3.6 MMOL/L (ref 3.5–5.1)
RBC # BLD AUTO: 5.38 M/UL (ref 4.2–5.9)
SODIUM SERPL-SCNC: 134 MMOL/L (ref 136–145)
WBC # BLD AUTO: 7.4 K/UL (ref 4–11)

## 2025-04-24 PROCEDURE — 36415 COLL VENOUS BLD VENIPUNCTURE: CPT

## 2025-04-24 PROCEDURE — 86403 PARTICLE AGGLUT ANTBDY SCRN: CPT

## 2025-04-24 PROCEDURE — 85025 COMPLETE CBC W/AUTO DIFF WBC: CPT

## 2025-04-24 PROCEDURE — 87075 CULTR BACTERIA EXCEPT BLOOD: CPT

## 2025-04-24 PROCEDURE — 2580000003 HC RX 258: Performed by: INTERNAL MEDICINE

## 2025-04-24 PROCEDURE — 2580000003 HC RX 258

## 2025-04-24 PROCEDURE — 6360000002 HC RX W HCPCS

## 2025-04-24 PROCEDURE — 87077 CULTURE AEROBIC IDENTIFY: CPT

## 2025-04-24 PROCEDURE — 80048 BASIC METABOLIC PNL TOTAL CA: CPT

## 2025-04-24 PROCEDURE — 1200000000 HC SEMI PRIVATE

## 2025-04-24 PROCEDURE — 99205 OFFICE O/P NEW HI 60 MIN: CPT | Performed by: INTERNAL MEDICINE

## 2025-04-24 PROCEDURE — 99222 1ST HOSP IP/OBS MODERATE 55: CPT | Performed by: HOSPITALIST

## 2025-04-24 PROCEDURE — 6360000002 HC RX W HCPCS: Performed by: INTERNAL MEDICINE

## 2025-04-24 PROCEDURE — 87205 SMEAR GRAM STAIN: CPT

## 2025-04-24 PROCEDURE — 87070 CULTURE OTHR SPECIMN AEROBIC: CPT

## 2025-04-24 PROCEDURE — 87186 SC STD MICRODIL/AGAR DIL: CPT

## 2025-04-24 RX ORDER — POTASSIUM CHLORIDE 7.45 MG/ML
10 INJECTION INTRAVENOUS
Status: DISPENSED | OUTPATIENT
Start: 2025-04-24 | End: 2025-04-24

## 2025-04-24 RX ORDER — CHLORTHALIDONE 25 MG/1
25 TABLET ORAL DAILY
Status: DISCONTINUED | OUTPATIENT
Start: 2025-04-24 | End: 2025-04-26 | Stop reason: HOSPADM

## 2025-04-24 RX ORDER — ATENOLOL 50 MG/1
50 TABLET ORAL DAILY
Status: DISCONTINUED | OUTPATIENT
Start: 2025-04-24 | End: 2025-04-26 | Stop reason: HOSPADM

## 2025-04-24 RX ADMIN — PIPERACILLIN AND TAZOBACTAM 3375 MG: 3; .375 INJECTION, POWDER, LYOPHILIZED, FOR SOLUTION INTRAVENOUS at 02:44

## 2025-04-24 RX ADMIN — ENOXAPARIN SODIUM 30 MG: 100 INJECTION SUBCUTANEOUS at 09:06

## 2025-04-24 RX ADMIN — POTASSIUM CHLORIDE 10 MEQ: 10 INJECTION, SOLUTION INTRAVENOUS at 02:05

## 2025-04-24 RX ADMIN — DAPTOMYCIN 700 MG: 500 INJECTION, POWDER, LYOPHILIZED, FOR SOLUTION INTRAVENOUS at 22:23

## 2025-04-24 RX ADMIN — VANCOMYCIN HYDROCHLORIDE 1000 MG: 1 INJECTION, POWDER, LYOPHILIZED, FOR SOLUTION INTRAVENOUS at 09:06

## 2025-04-24 RX ADMIN — PIPERACILLIN AND TAZOBACTAM 3375 MG: 3; .375 INJECTION, POWDER, LYOPHILIZED, FOR SOLUTION INTRAVENOUS at 17:52

## 2025-04-24 RX ADMIN — ENOXAPARIN SODIUM 30 MG: 100 INJECTION SUBCUTANEOUS at 22:21

## 2025-04-24 RX ADMIN — PIPERACILLIN AND TAZOBACTAM 3375 MG: 3; .375 INJECTION, POWDER, LYOPHILIZED, FOR SOLUTION INTRAVENOUS at 10:18

## 2025-04-24 ASSESSMENT — ENCOUNTER SYMPTOMS
GASTROINTESTINAL NEGATIVE: 1
RESPIRATORY NEGATIVE: 1

## 2025-04-24 NOTE — TELEPHONE ENCOUNTER
[facility] is calling to verify that  will follow pt thorough home care and if he will sigh orders for skilled nursing plz call back with verbal alexis   Call back [phone]

## 2025-04-24 NOTE — CONSULTS
Clinical Pharmacy Progress Note  Medication History     Pharmacy consulted to verify home medication list by Dr. Prado.  Home med list was updated by phaemiliano intern last evening - see note (copied below) for details.    Please call with questions--  Thanks--  Mary Bailey, PharmD, BCPS, [facility]  [id number] ([facility])   4/24/2025 12:25 PM    +++++++++++++++++++++++++++++++++++++++++++++++++++++++++++++++++++++  Clinical Pharmacy Consult Note  Medication History      Admit Date: 4/23/2025     List of current medications patient is taking is complete. Home Medication list in Epic updated to reflect changes noted below.     Source of information: Patient, dispense history      Patient's home pharmacy: [facility] - [facility] [address] - P [phone] - F [phone]       Changes made to medication list:      Medications removed:   None      Medications added:   Aspirin 81 mg tablet  Amoxicillin-clavulanate 500-125 mg tablet   Multivitamin minerals supplement     Medication doses adjusted:   Aspirin 325 mg tablet - dose frequency changed from 1 tablet daily to 1-3 tablets every 4-6 hours as needed for pain per patient      Other notes:   Aspirin 81 mg and 325 mg tablet - patient takes aspirin 81 mg daily for heart health, takes 325 mg tablets as needed for pain. Reported that on 04/23/2025 he took 81 mg in the morning, and then 975 mg later in the day for leg pain. His total dose was 1056 mg   Amoxicillin-clavulanate 500-125 mg tablet - per dispense history last filled 01/24/2025 for a 90 day supply. Patient reports he started taking it again Monday 04/21/2025, unclear whether provider directed him to re-start   Dispensing pharmacy: patient reports he uses [facility] long term pharmacy for regular prescriptions, stated he does not currently have a local pharmacy of choice for short term prescriptions. Stated he would be open to using the [facility]

## 2025-04-24 NOTE — PROGRESS NOTES
Internal Medicine Progress Note    Patient: Dominick Aguayo   : 1950   Admit Date: 2025     Date: 2025     Interval History     Patient seen and examined at bedside. Post I&D bedside with podiatry. Pulses intact other extremities. ID consulted. Cultures taken during bedside I&D.      ROS     Review of Systems   Constitutional:         Rigors   HENT: Negative.     Respiratory: Negative.     Cardiovascular: Negative.    Gastrointestinal: Negative.    Genitourinary: Negative.    Musculoskeletal: Negative.    Hematological: Negative.         Objective     I/Os:  I/O last 3 completed shifts:  In: 256.1 [P.O.:240; IV Piggyback:16.1]  Out: 500 [Urine:500]    Vital Signs:  Patient Vitals for the past 5 hrs:   BP Temp Temp src Pulse Resp SpO2   25 1131 (!) 144/79 98.3 °F (36.8 °C) Oral 80 18 96 %       Physical Exam:  Physical Exam  Vitals reviewed.   Constitutional:       Appearance: Normal appearance.   HENT:      Head: Normocephalic.      Mouth/Throat:      Mouth: Mucous membranes are moist.      Pharynx: Oropharynx is clear.   Eyes:      Pupils: Pupils are equal, round, and reactive to light.   Neck:      Vascular: No carotid bruit.   Cardiovascular:      Rate and Rhythm: Normal rate and regular rhythm.   Pulmonary:      Effort: Pulmonary effort is normal.      Breath sounds: Normal breath sounds.   Abdominal:      General: Abdomen is flat. Bowel sounds are normal.      Palpations: Abdomen is soft. There is no mass.      Hernia: No hernia is present.   Musculoskeletal:      Cervical back: Normal range of motion.      Right lower leg: No edema.      Left lower leg: No edema.      Comments: erythema, warmth, swelling R foot&ankle. Dark, ~5mm, circular wound on plantar aspect of R foot   Lymphadenopathy:      Cervical: No cervical adenopathy.   Skin:     Findings: Erythema (R foot) present.   Neurological:      Mental Status: He is alert and oriented to person, place, and time. Mental status is

## 2025-04-24 NOTE — CONSULTS
Infectious Diseases Inpatient Consult Note    Reason for Consult:   Complicated L foot infection  Requesting Physician:   Dr Egan  Primary Care Physician:  Van Egan MD  History Obtained From:   Pt, EPIC    Admit Date: 4/23/2025  Hospital Day: 2    CHIEF COMPLAINT:       Chief Complaint   Patient presents with    Wound Infection       HISTORY OF PRESENT ILLNESS:      75 yo man   PMH - neuropathy, HTN, MIKE, HCV, MARY  PSurgHx - gastric bypass, mult foot surg    Hx severe neuropathy (NOT DM).    Hx R TMA 9/13/16  Hx L  TMA 9/8/17   - L TMA stump infection / SSI  - s/p debridement 9/24, 9/27, 9/30  - hx MDR Pseudomonas (see below for sensitivities)    Admit 9/21 with SOB, HA, chills, nausea.     WBC 12.2, Cr 1.8.  ESR 90, CRP>300  L foot x-ray with gas.    Bedside I&D 9/22.  Cult - Prevotella, Streptococcus, Enterococcus, Peptoniphus sp   OR 9/23 - purulent fluid, necrotic ST, limited bleeding  Rash w Unasyn.  Rx Zosyn  through 11/14/24    Pt developed small wound / ulceration and erythema over 1-2 days.  Pt had shaking chills.  Seen by Podiatry 4/23 (Sandoval), referred to     In ED 4/23, afebrile, WBC 11.0.  ESR 40, . Cr 1.3  X-ray - STS, no bone changes  MRI - ulceration, abscess, tenosynovitis  Admit, started on Vancomycin, Zosyn    Today 4/24, afeb, WBC 7.4  Podiatry performed bedside I&D      Past Medical History:    Past Medical History:   Diagnosis Date    Amputation of foot (HCC) 03/12/2024    Chest pain 01/02/2015    Chronic GERD     ESBL (extended spectrum beta-lactamase) producing bacteria infection 09/11/2016    E.coli ESBL-8/2015    Gastroesophageal reflux disease without esophagitis 03/12/2024    H/O gastric bypass 03/12/2024    Hepatitis C virus 01/02/2015    says he received treatment    Hx of hepatitis C 01/16/2015    Hyperlipidemia     Hypertension     Hypertension     Hypertension, essential     MDRO (multiple drug resistant organisms) resistance 09/10/2016    Pseudomonas MDRO in foot

## 2025-04-24 NOTE — DISCHARGE INSTR - COC
Continuity of Care Form    Patient Name: Dominick Aguayo   :  1950  MRN:  [id number]    Admit date:  2025  Discharge date:  ***    Code Status Order: Full Code   Advance Directives:     Admitting Physician:  Patrick Chaney MD  PCP: Van Egan MD    Discharging Nurse: ***  Discharging Hospital Unit/Room#: 5315/5315-01  Discharging Unit Phone Number: ***    Emergency Contact:   Extended Emergency Contact Information  Primary Emergency Contact: bhavik parish   [facility]  Home Phone: [phone]  Mobile Phone: [phone]  Relation: Child    Past Surgical History:  Past Surgical History:   Procedure Laterality Date    FOOT DEBRIDEMENT Left 2024    INCISION AND DRAINAGE LEFT FOOT WITH RESECTION OF OSTEOMYELYTIC BONE performed by Marisol Yates DPM at [facility] OR    FOOT SURGERY Bilateral 10/8/2015    left foot bone biopsy X 2, bilat wound debridement, bilat. amnio graft    FOOT SURGERY Right 2016    TRANSMETATARSAL AMPUTATION RIGHT FOOT;    FOOT SURGERY Left 2017    INCISION AND DRAINAGE WITH EXCISION OF ALL NECROTIC SOFT TISSUE AND BONE, LEFT FOOT WITH APPLICATION OF WOUND VAC    INVASIVE VASCULAR N/A 2024    Angiography lower ext left performed by Preethi Ibrahim MD at [facility] CARDIAC CATH LAB    OTHER SURGICAL HISTORY  2016    PARTIAL FIRST RAY TOE AMPUTATION - RIGHT FOOT (VANCOMYCIN    OTHER SURGICAL HISTORY Left 2017     TRANSMETATARSAL AMPUTATION LEFT FOOT ;    OTHER SURGICAL HISTORY Left 2017    INCISION AND DRAINAGE OF NECROTIC TISSUE AND BONE WITH RE-    OTHER SURGICAL HISTORY Left 2017    RESECTION FIRST METATARSAL WITH PARTIAL RESECTION SECOND,    SLEEVE GASTRECTOMY N/A 10/5/2021    ROBOTIC SLEEVE GASTRECTOMY; ROBOTIC HIATAL HERNIA REPAIR performed by Ganesh Isaac DO at [facility] OR    TONSILLECTOMY      UPPER GASTROINTESTINAL ENDOSCOPY N/A 2021    EGD BIOPSY performed by Ganesh Isaac DO at [facility] ENDOSCOPY       Immunization History:  Restriction: {CHP DME Yes amt example:051294684}  Last Modified Barium Swallow with Video (Video Swallowing Test): {Done Not Done Date:}    Treatments at the Time of Hospital Discharge:   Respiratory Treatments: ***  Oxygen Therapy:  {Therapy; copd oxygen:75391}  Ventilator:    {[facility] Vent List:335793666}    Rehab Therapies: {THERAPEUTIC INTERVENTION:0693032140}  Weight Bearing Status/Restrictions: {[facility] Weight Bearin}  Other Medical Equipment (for information only, NOT a DME order):  {EQUIPMENT:832979583}  Other Treatments: ***    Patient's personal belongings (please select all that are sent with patient):  {P DME Belongings:849095680}    RN SIGNATURE:  {Esignature:898499281}    CASE MANAGEMENT/SOCIAL WORK SECTION    Inpatient Status Date: ***    Readmission Risk Assessment Score:  [facility] RISK OF UNPLANNED READMISSION 2.0             8.4 Total Score        Discharging to Facility/ Agency   Name:   Address:  Phone:  Fax:    Dialysis Facility (if applicable)   Name:  Address:  Dialysis Schedule:  Phone:  Fax:    / signature: {Esignature:597920936}    PHYSICIAN SECTION    Prognosis: Good    Condition at Discharge: Stable    Rehab Potential (if transferring to Rehab): Good    Recommended Labs or Other Treatments After Discharge:     Recommended Follow-up, Labs or Other Treatments After Discharge:    ID INFUSION ORDERS:    Zosyn 13.5  gm iv daily (by continuous infusion) through 5/15  - Diagnosis - R foot cellulitis / abscess  - First dose given in hospital  - Routine CVC care   - Labs: CBC w diff, CMP, ESR, CRP every Mon or Tue, FAX results to [phone]  - Call with antibiotic / infusion issues, [phone]  - Call with any change in status, transfer in or out of a facility or to hospital - [phone]  - Orders only valid for current disposition, NOT transferable upon discharge from facility or new admission to another facility.  - No f/u in outpatient ID office

## 2025-04-24 NOTE — CONSULTS
[facility] -  Clinical Pharmacy Note    Vancomycin - Management by Pharmacy    Consult Date(s): 04/24/2025  Consulting Provider(s): Dr. Prado     Assessment / Plan  Cellulitis of right foot- Vancomycin  Concurrent Antimicrobials: Zosyn  Day of Vanc Therapy / Ordered Duration: Day 2 of 7  Current Dosing Method: Bayesian-Guided AUC Dosing  Therapeutic Goal: -600 mg/L*hr  Current Dose / Plan:   Vancomycin 2500 mg IV (~ 25 mg/kg) x1  Will follow with Vancomycin 1000 mg IV Q12H  Estimated AUCss ~ 543 mg/L*hr and troughss ~ 17.7 mg/L  Will continue to monitor clinical condition and make adjustments to regimen as appropriate.    Thank you for consulting pharmacy,      Gillian Vazquez, PharmD  [id number] (Main Pharmacy)        Interval update:  therapy initiation     Subjective/Objective:   Dominick Aguayo is a 74 y.o. male with a PMHx significant for peripheral neuropathy, chronic foot ulcers, osteomyelitis, bilateral midfoot amputations secondary to osteomyelitis, HTN, HLD, chronic HCV who is admitted with cellulitis.     Pharmacy is consulted to dose Vancomycin.    Ht Readings from Last 1 Encounters:   04/23/25 1.956 m (6' 5\")     Wt Readings from Last 1 Encounters:   04/23/25 112.8 kg (248 lb 11.2 oz)     Current & Prior Antimicrobial Regimen(s):  Zosyn (04/23- Current)  Vancomycin- Pharmacy to dose (04/23- Current)  2500 mg IV x1 (04/23)  1000 mg IV Q12H (04/24- Current)    Vancomycin Level(s) / Doses:    Date Time Dose Type of Level / Level Interpretation                 Note: Serum levels collected for AUC-based dosing may be high if collected in close proximity to the dose administered. This is not necessarily indicative of toxicity.    Cultures & Sensitivities:    Date Site Micro Susceptibility / Result   04/23 Blood x2 Sent            Recent Labs     04/23/25  1928 04/24/25  0421   CREATININE 1.3 1.2   BUN 23* 21*   WBC 11.0 7.4       Estimated Creatinine Clearance: 74 mL/min (based on SCr of 1.2

## 2025-04-24 NOTE — CARE COORDINATION
Case Management Assessment  Initial Evaluation    Date/Time of Evaluation: 4/24/2025 11:24 AM  Assessment Completed by: Thania Rivera RN    If patient is discharged prior to next notation, then this note serves as note for discharge by case management.    Patient Name: Dominick Aguayo                   YOB: 1950  Diagnosis: Cellulitis of right foot [L03.115]  Right foot infection [L08.9]                   Date / Time: 4/23/2025  6:23 PM    Patient Admission Status: Inpatient   Readmission Risk (Low < 19, Mod (19-27), High > 27): Readmission Risk Score: 8.4    Current PCP: Van Egan MD  PCP verified by CM? Yes    Chart Reviewed: Yes      History Provided by: Patient  Patient Orientation: Alert and Oriented    Patient Cognition: Alert    Hospitalization in the last 30 days (Readmission):  No    If yes, Readmission Assessment in CM Navigator will be completed.    Advance Directives:      Code Status: Full Code   Patient's Primary Decision Maker is: Legal Next of Kin      Discharge Planning:    Patient lives with: Alone Type of Home: Apartment  Primary Care Giver: Self  Patient Support Systems include: Children, Family Members, Friends/Neighbors   Current Financial resources: Medicare  Current community resources: None  Current services prior to admission: None            Current DME:              Type of Home Care services:  None    ADLS  Prior functional level: Independent in ADLs/IADLs  Current functional level: Independent in ADLs/IADLs    PT AM-PAC:   /24  OT AM-PAC:   /24    Family can provide assistance at DC: No  Would you like Case Management to discuss the discharge plan with any other family members/significant others, and if so, who? No  Plans to Return to Present Housing: Yes  Other Identified Issues/Barriers to RETURNING to current housing: n/a  Potential Assistance needed at discharge: Home Care            Potential DME:    Patient expects to discharge to: Apartment  Plan for  transportation at discharge: Self    Financial    Payor: [employer] MEDICARE / Plan: [employer] GOLD PLUS HMO / Product Type: *No Product type* /     Does insurance require precert for SNF: Yes    Potential assistance Purchasing Medications: No  Meds-to-Beds request:        [facility] - [place], [place] - [address] - P [phone] - F [phone] [address] [facility] [facility] [address] [address]  Phone: [phone] Fax: [phone]    [id number] - [place], [place] - [address] - P [phone] - F [phone]  [address]  [facility] [address]  Phone: [phone] Fax: [phone]      Notes:    Factors facilitating achievement of predicted outcomes: Family support, Friend support, Motivated, Cooperative, and Pleasant    Barriers to discharge: Pain, Wound Care, and podiatry consulted, ID consulted    Additional Case Management Notes: Patient is from home alone and he is independent at baseline with all ADL's. He would like home care and requested [facility], particularly Yue who was his nurse before. Referral was made with [facility]. If he needs IV ABX he had [facility] before. Patient will have transport to home at d/c.     The Plan for Transition of Care is related to the following treatment goals of Cellulitis of right foot [L03.115]  Right foot infection [L08.9]    IF APPLICABLE: The Patient and/or patient representative Dominick and his family were provided with a choice of provider and agrees with the discharge plan. Freedom of choice list with basic dialogue that supports the patient's individualized plan of care/goals and shares the quality data associated with the providers was provided to: Patient   Patient Representative Name:       The Patient and/or Patient Representative Agree with the Discharge Plan? Yes    Thania Rivera RN  Case Management Department  Ph: [phone] Fax: [phone]

## 2025-04-24 NOTE — H&P
Internal Medicine H&P    Date: 2025   Patient: Dominick Aguayo   Patient : 1950     CC: Wound Infection       Subjective     Chief Complaint   Patient presents with    Wound Infection      HPI: 74-year-old male with history of peripheral neuropathy, chronic foot ulcers, osteomyelitis, bilateral midfoot amputations secondary to osteomyelitis, HTN, HLD, chronic HCV, presented to ED for concern of worsening swelling of right foot.    Patient was last admitted in 2024-10/01/2024 for concern of necrotizing infection of the left foot, underwent washout procedure by podiatry, cultures grew E. Fecalis, strep. Viridans, two balloon angioplasties by vascular surgery, antibiotic treatment including Vanco cefepime and clindamycin, later transitioned to Zosyn by ID upon discharge.      Today, patient reports that on Monday patient felt subjective fevers including chills, fatigue, flushed face.  At this point patient also noticed wound on plantar aspect of his right foot.  He denies walking barefoot, uses prosthesis insert/socks and checks his feet daily with mirror for signs of injury. Patient reported worsening swelling over the last 2 days, extending to dorsal aspect of foot and up to his ankle.  Due to patient's history of osteomyelitis, amputations, neuropathy, patient decided to seek emergent medical attention.  Patient denies any pain due to his history of neuropathy, no trauma.  He denies documented fevers, lightheadedness, dizziness, recent URI, abdominal pain.  Patient reported that he has been following Dr. Green for his foot infection since getting discharged from hospital.  He reported that after he completed his recommended Zosyn antibiotic through PICC line, he was switched to oral Augmentin.  Patient is currently taking Augmentin, however, he reduced the prescribed dose 2 tablets daily to 0.5 daily for the last month.     ED course:  Vitals: Afebrile, HR 88, RR 16, /80.    Labs:  ext left performed by Preethi Ibrahim MD at [facility] CARDIAC CATH LAB    OTHER SURGICAL HISTORY  09/11/2016    PARTIAL FIRST RAY TOE AMPUTATION - RIGHT FOOT (VANCOMYCIN    OTHER SURGICAL HISTORY Left 09/08/2017     TRANSMETATARSAL AMPUTATION LEFT FOOT ;    OTHER SURGICAL HISTORY Left 09/27/2017    INCISION AND DRAINAGE OF NECROTIC TISSUE AND BONE WITH RE-    OTHER SURGICAL HISTORY Left 09/30/2017    RESECTION FIRST METATARSAL WITH PARTIAL RESECTION SECOND,    SLEEVE GASTRECTOMY N/A 10/5/2021    ROBOTIC SLEEVE GASTRECTOMY; ROBOTIC HIATAL HERNIA REPAIR performed by Ganesh Isaac DO at [facility] OR    TONSILLECTOMY      UPPER GASTROINTESTINAL ENDOSCOPY N/A 7/12/2021    EGD BIOPSY performed by Ganesh Isaac DO at [facility] ENDOSCOPY        No current facility-administered medications on file prior to encounter.     Current Outpatient Medications on File Prior to Encounter   Medication Sig Dispense Refill    amoxicillin-clavulanate (AUGMENTIN) 500-125 MG per tablet Take 1 tablet by mouth daily      aspirin 81 MG EC tablet Take 1 tablet by mouth daily      Multiple Vitamins-Minerals (THERAPEUTIC MULTIVITAMIN-MINERALS) tablet Take 1 tablet by mouth daily      omeprazole (PRILOSEC) 40 MG delayed release capsule Take 1 capsule by mouth every morning (before breakfast) 90 capsule 3    atenolol-chlorthalidone (TENORETIC) 50-25 MG per tablet Take 1 tablet by mouth daily 90 tablet 1    aspirin 325 MG tablet Take 1 tablet by mouth daily (Patient taking differently: Take 1-3 tablets by mouth every 4-6 hours as needed for Pain) 30 tablet 3    sildenafil (VIAGRA) 100 MG tablet Take 1 tablet by mouth as needed for Erectile Dysfunction (Patient not taking: Reported on 4/23/2025) 10 tablet 3        Allergies:  Other and Unasyn [ampicillin-sulbactam sodium]    SocHx:  Social History     Socioeconomic History    Marital status:      Spouse name: None    Number of children: None    Years of education: None    Highest education level: None   Tobacco

## 2025-04-24 NOTE — PROGRESS NOTES
Clinical Pharmacy Consult Note  Medication History     Admit Date: 4/23/2025    List of current medications patient is taking is complete. Home Medication list in Epic updated to reflect changes noted below.    Source of information: Patient, dispense history     Patient's home pharmacy: [facility] - [place], [place] - [address] - P [phone] - F [phone]      Changes made to medication list:     Medications removed:   None     Medications added:   Aspirin 81 mg tablet  Amoxicillin-clavulanate 500-125 mg tablet   Multivitamin minerals supplement    Medication doses adjusted:   Aspirin 325 mg tablet - dose frequency changed from 1 tablet daily to 1-3 tablets every 4-6 hours as needed for pain per patient     Other notes:   Aspirin 81 mg and 325 mg tablet - patient takes aspirin 81 mg daily for heart health, takes 325 mg tablets as needed for pain. Reported that on 04/23/2025 he took 81 mg in the morning, and then 975 mg later in the day for leg pain. His total dose was 1056 mg   Amoxicillin-clavulanate 500-125 mg tablet - per dispense history last filled 01/24/2025 for a 90 day supply. Patient reports he started taking it again Monday 04/21/2025, unclear whether provider directed him to re-start   Dispensing pharmacy: patient reports he uses [facility] long term pharmacy for regular prescriptions, stated he does not currently have a local pharmacy of choice for short term prescriptions. Stated he would be open to using the [facility]     Current Outpatient Medications   Medication Instructions    amoxicillin-clavulanate (AUGMENTIN) 500-125 MG per tablet 1 tablet, DAILY    aspirin 325 mg, Oral, DAILY    aspirin 81 mg, DAILY    atenolol-chlorthalidone (TENORETIC) 50-25 MG per tablet 1 tablet, Oral, DAILY    Multiple Vitamins-Minerals (THERAPEUTIC MULTIVITAMIN-MINERALS) tablet 1 tablet, DAILY    omeprazole (PRILOSEC) 40 mg, Oral, DAILY BEFORE BREAKFAST    sildenafil  (VIAGRA) 100 mg, Oral, PRN       Thank you for consulting pharmacy,    Luly Fuchs, PharmD Candidate 2027

## 2025-04-24 NOTE — PLAN OF CARE
Problem: Discharge Planning  Goal: Discharge to home or other facility with appropriate resources  Outcome: Progressing  Flowsheets (Taken 4/24/2025 0118)  Discharge to home or other facility with appropriate resources:   Identify barriers to discharge with patient and caregiver   Arrange for needed discharge resources and transportation as appropriate   Identify discharge learning needs (meds, wound care, etc)     Problem: Safety - Adult  Goal: Free from fall injury  Outcome: Progressing

## 2025-04-24 NOTE — CONSULTS
tablet Take 1 tablet by mouth daily 90 tablet 1    aspirin 325 MG tablet Take 1 tablet by mouth daily (Patient taking differently: Take 1-3 tablets by mouth every 4-6 hours as needed for Pain) 30 tablet 3    sildenafil (VIAGRA) 100 MG tablet Take 1 tablet by mouth as needed for Erectile Dysfunction (Patient not taking: Reported on 4/23/2025) 10 tablet 3       Current Meds  [Held by provider] atenolol (TENORMIN) tablet 50 mg, Daily   And  [Held by provider] chlorthalidone (HYGROTON) tablet 25 mg, Daily  vancomycin (VANCOCIN) 1,000 mg in sodium chloride 0.9 % 250 mL IVPB ([id number]), Q12H  [Held by provider] pantoprazole (PROTONIX) tablet 40 mg, QAM AC  [Held by provider] aspirin tablet 325 mg, Daily  sodium chloride flush 0.9 % injection 5-40 mL, 2 times per day  sodium chloride flush 0.9 % injection 5-40 mL, PRN  0.9 % sodium chloride infusion, PRN  enoxaparin Sodium (LOVENOX) injection 30 mg, BID  ondansetron (ZOFRAN-ODT) disintegrating tablet 4 mg, Q8H PRN   Or  ondansetron (ZOFRAN) injection 4 mg, Q6H PRN  polyethylene glycol (GLYCOLAX) packet 17 g, Daily PRN  acetaminophen (TYLENOL) tablet 650 mg, Q6H PRN   Or  acetaminophen (TYLENOL) suppository 650 mg, Q6H PRN  piperacillin-tazobactam (ZOSYN) 3,375 mg in sodium chloride 0.9 % 50 mL IVPB (addEASE), Q8H        Family History:   History reviewed. No pertinent family history.    Social History:   TOBACCO:   reports that he quit smoking about 8 years ago. His smoking use included cigarettes. He started smoking about 13 years ago. He has a 1.3 pack-year smoking history. He has never used smokeless tobacco.  ETOH:   reports that he does not currently use alcohol after a past usage of about 3.0 standard drinks of alcohol per week.  DRUGS:   reports no history of drug use.      REVIEW OF SYSTEMS:    As above.    PHYSICAL EXAM:      Vitals:    BP (!) 122/54   Pulse 76   Temp 98.7 °F (37.1 °C) (Oral)   Resp 16   Ht 1.956 m (6' 5\")   Wt 112.8 kg (248 lb 11.2 oz)    Not obtained  -Bedside I&D performed, see details above  -RLE dressed with gauze, Kerlix, Ace  -Antibiotics: Vancomycin and Zosyn  -Consults: Infectious disease  -Weightbearing: As tolerated    Bedside incision and drainage performed, a culture was obtained.      DISPO: Bedside I&D, right foot.  All labs and images reviewed, impressions noted above.  Continue IV antibiotics: Vancomycin and Zosyn.  No podiatry surgery at this juncture.  Patient will continue to monitor.    - The patient will be staffed with Dr. Stepan Nick DPM.    Marko Tolbert DPM, MS  Podiatric Resident PGY-1  [employer]  Pager #[phone]  4/24/2025, 6:49 AM

## 2025-04-24 NOTE — PROGRESS NOTES
4 Eyes Skin Assessment     NAME:  Dominick Aguayo  YOB: 1950  MEDICAL RECORD NUMBER:  [id number]    The patient is being assessed for  Admission    I agree that at least one RN has performed a thorough Head to Toe Skin Assessment on the patient. ALL assessment sites listed below have been assessed.      Areas assessed by both nurses:    Head, Face, Ears, Shoulders, Back, Chest, Arms, Elbows, Hands, Sacrum. Buttock, Coccyx, Ischium, Legs. Feet and Heels, Under Medical Devices , and Other          Does the Patient have a Wound? Yes wound(s) were present on assessment. LDA wound assessment was Initiated and completed by RN  Right plantar forefoot wound  Blanchable redness to back and chest       Christopher Prevention initiated by RN: Yes  Wound Care Orders initiated by RN: Yes    Pressure Injury (Stage 3,4, Unstageable, DTI, NWPT, and Complex wounds) if present, place Wound referral order by RN under : No    New Ostomies, if present place, Ostomy referral order under : No     Nurse 1 eSignature: Electronically signed by Danielle Sanchez RN on 4/24/25 at 3:53 AM EDT    **SHARE this note so that the co-signing nurse can place an eSignature**    Nurse 2 eSignature: Electronically signed by CELIA DONOVAN RN on 4/24/25 at 4:21 AM EDT     Patient admitted to room 5315 from ED. Patient is A&O x 4. VSS. Patient oriented to the room all safety measures in place. Patient given IS and SCDs at this time. Admission orders released and patient 4 eyes completed. Admission documentation completed. No other needs are noted at this time.    [x] Bed alarm on and cord plugged into wall  [x] Bed in lowest position  [x] Call light and bedside table within reach  [x] Patient educated on all safety measures  []Oxygen connected to wall (if applicable)     Nurse 1 Esignature: Electronically signed by Danielle Sanchez RN on 4/24/25 at 3:54 AM EDT  Nurse 2 Esignature: Electronically signed by CELIA DONOVAN

## 2025-04-24 NOTE — PROGRESS NOTES
[facility] -  Clinical Pharmacy Note    Vancomycin - Management by Pharmacy    Consult Date(s): 04/24/2025  Consulting Provider(s): Dr. Prado     Assessment / Plan  1)  Rt foot cellulitis with abscess - Vancomycin  Concurrent Antimicrobials: Zosyn  Day of Vanc Therapy / Ordered Duration: Day 2 of 7  Current Dosing Method: Bayesian-Guided AUC Dosing  Therapeutic Goal: -600 mg/L*hr  Current Dose / Plan:   SCr 1.3-->1.2 this AM.  Recent baseline appears to be ~0.9-1.2.  Currently on 1000mg IV q12h.  Regimen predicts an AUC = 552 with trough = 17.9 mcg/mL.  Random level is ordered for 4/25 AM to further evaluate above regimen.  Will continue to monitor clinical condition and make adjustments to regimen as appropriate.    Please call with questions--  Thanks--  Mary Bailey, PharmD, BCPS, BCGP  [phone] (hospitals)   4/24/2025 12:31 PM      Interval update:  Podiatry performed bedside I&D this AM; cultures sent.  ID consulted to evaluate.    Subjective/Objective:   Dominick Aguayo is a 74 y.o. male with a PMHx significant for peripheral neuropathy, chronic foot ulcers, osteomyelitis, bilateral midfoot amputations secondary to osteomyelitis, HTN, HLD, chronic HCV, and PAD who is admitted with Rt foot cellulitis / abscess.     Pharmacy is consulted to dose Vancomycin.    Ht Readings from Last 1 Encounters:   04/23/25 1.956 m (6' 5\")     Wt Readings from Last 1 Encounters:   04/23/25 112.8 kg (248 lb 11.2 oz)     Current & Prior Antimicrobial Regimen(s):  Zosyn (4/23- Current)  Vancomycin - Pharmacy to dose  2500 mg IV x1 (4/23 20:00)  1000 mg IV Q12H (4/24- Current)    Vancomycin Level(s) / Doses:    Date Time Dose Type of Level / Level Interpretation   4/25  1000mg q12h Random = ordered           Note: Serum levels collected for AUC-based dosing may be high if collected in close proximity to the dose administered. This is not necessarily indicative of toxicity.    Cultures &  Sensitivities:    Date Site Micro Susceptibility / Result   4/23 Blood x2 Sent    4/24 Foot cx sent      Recent Labs     04/23/25  1928 04/24/25  0421   CREATININE 1.3 1.2   BUN 23* 21*   WBC 11.0 7.4       Estimated Creatinine Clearance: 74 mL/min (based on SCr of 1.2 mg/dL).    Additional Lab Values / Findings of Note:    No results for input(s): \"PROCAL\" in the last 72 hours.

## 2025-04-24 NOTE — DISCHARGE INSTRUCTIONS
We saw you in the hospital for a full thickness ulceration and abscess of your right foot.   You were treated with incision and drainage of the abscess, as well as with antibiotics, daptomycin and piperacillin-tazobactam.  You will continue to receive antibiotics through your PICC line as prescribed by infectious disease physician, Dr. George Green.    Important Reminders:    Please call [phone] and schedule an appointment with your primary care physician, Dr. Van Egan, within 1 week of discharge from the hospital.    Please call [phone] and schedule a follow-up appointment with your podiatrist, Dr. Stepan Nick, for follow-up and continued wound care.    Your potassium was found to be low.  Please take potassium chloride 20 mEq daily for 7 days and have your labs drawn either Tuesday, 4/29 or Wednesday, 4/30.    Carefully follow the daily dressing change instructions as per the details below.    Carefully follow the instructions on how to care for your peripherally inserted catheter (PICC) line as detailed below.    Podiatry Wound Care Discharge Instructions:    Please perform every day dressing changes to right lower extremity as follows:    Apply saline soaked gauze to the wound on the right lower extremity.    Next apply gauze to the top of the right foot, ankle, and leg. This step is critical as Kerlix is abrasive and will rub wounds at the front of the ankle.     Next loosely wrap the right lower extremity with Kerlix starting from just in front of the toes and ending just above the ankle. Kerlix should be rolled on with absolutely no compression.    Next gently wrap the right foot with a 4-inch Ace bandage starting from just in front of the toes and ending just above the ankle.    Please follow-up with Dr. Stepan Nick DPLISA for continued wound care    Caring for Your Peripherally Inserted Central Catheter (PICC)    You are going home with a peripherally inserted catheter (PICC). This small,  arm.  Drink plenty of water. Staying hydrated helps keep blood clots from forming.    Prevent Infection with Good Hand Hygiene  A PICC can let germs into your body. This can lead to serious and sometimes deadly infections. To prevent infection, it’s very important that you, your caregivers and others around you use good hand hygiene. This means washing your hands well with soap and water, and cleaning them with alcohol based hand gel as directed. Never touch the PICC or dressing without first using one of the methods.     To wash your hands with soap and water:  Wet your hands with warm water. (Avoid hot water, which can cause skin irritation when you wash your hands often).  Apply enough soap to cover the entire surface of your hands, including fingers.  Rub your hands together vigorously for at least 15 seconds. Make sure to rub the front and back of each hand up to the wrist, your fingers and fingernails, between the fingers, and each thumb.  Rinse your hands with warm water.  Dry your hands completely with a new paper towel. Don’t use a cloth towel or other reusable towel. These can harbor germs.  Use the paper towel to turn off the faucet, then throw it away. If you are in a bathroom, also use a paper towel to open the door instead of touching the handle.  When you don’t have access to soap and water: Use alcohol-based hand gel. The gel should have at least 60% alcohol. Follow the instructions on the package. Your healthcare team can answer any questions you have about when to use hand gel, or when it’s better to wash with soap and water.    When to Call Your Healthcare Provider  Call your provider right away if you have any of the following:  Pain or burning in your shoulder, chest, back, arm, or leg  Fever of 100.4 F or higher  Chills  Signs of infection at the catheter site (pain, redness, drainage, burning, or stinging)  Coughing, wheezing, or shortness of breath  A racing or irregular heartbeat  Muscle

## 2025-04-25 LAB
ANION GAP SERPL CALCULATED.3IONS-SCNC: 13 MMOL/L (ref 3–16)
BASOPHILS # BLD: 0 K/UL (ref 0–0.2)
BASOPHILS NFR BLD: 0.7 %
BUN SERPL-MCNC: 21 MG/DL (ref 7–20)
CALCIUM SERPL-MCNC: 9.3 MG/DL (ref 8.3–10.6)
CHLORIDE SERPL-SCNC: 96 MMOL/L (ref 99–110)
CO2 SERPL-SCNC: 29 MMOL/L (ref 21–32)
CREAT SERPL-MCNC: 1.2 MG/DL (ref 0.8–1.3)
CRP SERPL-MCNC: 235 MG/L (ref 0–5.1)
DEPRECATED RDW RBC AUTO: 14.8 % (ref 12.4–15.4)
EOSINOPHIL # BLD: 0.2 K/UL (ref 0–0.6)
EOSINOPHIL NFR BLD: 4.5 %
ERYTHROCYTE [SEDIMENTATION RATE] IN BLOOD BY WESTERGREN METHOD: 47 MM/HR (ref 0–20)
GFR SERPLBLD CREATININE-BSD FMLA CKD-EPI: 63 ML/MIN/{1.73_M2}
GLUCOSE SERPL-MCNC: 112 MG/DL (ref 70–99)
HCT VFR BLD AUTO: 48.6 % (ref 40.5–52.5)
HGB BLD-MCNC: 16.7 G/DL (ref 13.5–17.5)
LYMPHOCYTES # BLD: 0.7 K/UL (ref 1–5.1)
LYMPHOCYTES NFR BLD: 13.4 %
MAGNESIUM SERPL-MCNC: 2.06 MG/DL (ref 1.8–2.4)
MCH RBC QN AUTO: 30.5 PG (ref 26–34)
MCHC RBC AUTO-ENTMCNC: 34.3 G/DL (ref 31–36)
MCV RBC AUTO: 88.8 FL (ref 80–100)
MONOCYTES # BLD: 0.5 K/UL (ref 0–1.3)
MONOCYTES NFR BLD: 10.9 %
NEUTROPHILS # BLD: 3.4 K/UL (ref 1.7–7.7)
NEUTROPHILS NFR BLD: 70.5 %
PLATELET # BLD AUTO: 119 K/UL (ref 135–450)
PMV BLD AUTO: 8.9 FL (ref 5–10.5)
POTASSIUM SERPL-SCNC: 2.8 MMOL/L (ref 3.5–5.1)
POTASSIUM SERPL-SCNC: 3.4 MMOL/L (ref 3.5–5.1)
RBC # BLD AUTO: 5.47 M/UL (ref 4.2–5.9)
SODIUM SERPL-SCNC: 138 MMOL/L (ref 136–145)
WBC # BLD AUTO: 4.9 K/UL (ref 4–11)

## 2025-04-25 PROCEDURE — C1751 CATH, INF, PER/CENT/MIDLINE: HCPCS

## 2025-04-25 PROCEDURE — 80048 BASIC METABOLIC PNL TOTAL CA: CPT

## 2025-04-25 PROCEDURE — 85025 COMPLETE CBC W/AUTO DIFF WBC: CPT

## 2025-04-25 PROCEDURE — 99233 SBSQ HOSP IP/OBS HIGH 50: CPT | Performed by: INTERNAL MEDICINE

## 2025-04-25 PROCEDURE — 2500000003 HC RX 250 WO HCPCS

## 2025-04-25 PROCEDURE — 6370000000 HC RX 637 (ALT 250 FOR IP)

## 2025-04-25 PROCEDURE — 84132 ASSAY OF SERUM POTASSIUM: CPT

## 2025-04-25 PROCEDURE — 2580000003 HC RX 258

## 2025-04-25 PROCEDURE — 6360000002 HC RX W HCPCS: Performed by: INTERNAL MEDICINE

## 2025-04-25 PROCEDURE — 0J9Q0ZZ DRAINAGE OF RIGHT FOOT SUBCUTANEOUS TISSUE AND FASCIA, OPEN APPROACH: ICD-10-PCS

## 2025-04-25 PROCEDURE — 6360000002 HC RX W HCPCS: Performed by: HOSPITALIST

## 2025-04-25 PROCEDURE — 1200000000 HC SEMI PRIVATE

## 2025-04-25 PROCEDURE — 85652 RBC SED RATE AUTOMATED: CPT

## 2025-04-25 PROCEDURE — 36415 COLL VENOUS BLD VENIPUNCTURE: CPT

## 2025-04-25 PROCEDURE — 6360000002 HC RX W HCPCS

## 2025-04-25 PROCEDURE — 2580000003 HC RX 258: Performed by: INTERNAL MEDICINE

## 2025-04-25 PROCEDURE — 83735 ASSAY OF MAGNESIUM: CPT

## 2025-04-25 PROCEDURE — 86140 C-REACTIVE PROTEIN: CPT

## 2025-04-25 PROCEDURE — 99232 SBSQ HOSP IP/OBS MODERATE 35: CPT | Performed by: HOSPITALIST

## 2025-04-25 PROCEDURE — 36569 INSJ PICC 5 YR+ W/O IMAGING: CPT

## 2025-04-25 PROCEDURE — 2500000003 HC RX 250 WO HCPCS: Performed by: INTERNAL MEDICINE

## 2025-04-25 RX ORDER — SODIUM CHLORIDE 0.9 % (FLUSH) 0.9 %
5-40 SYRINGE (ML) INJECTION EVERY 12 HOURS SCHEDULED
Status: DISCONTINUED | OUTPATIENT
Start: 2025-04-25 | End: 2025-04-26 | Stop reason: HOSPADM

## 2025-04-25 RX ORDER — CALCIUM CARBONATE 500 MG/1
500 TABLET, CHEWABLE ORAL 3 TIMES DAILY PRN
Status: DISCONTINUED | OUTPATIENT
Start: 2025-04-25 | End: 2025-04-26 | Stop reason: HOSPADM

## 2025-04-25 RX ORDER — LIDOCAINE HYDROCHLORIDE 10 MG/ML
50 INJECTION, SOLUTION EPIDURAL; INFILTRATION; INTRACAUDAL; PERINEURAL ONCE
Status: DISCONTINUED | OUTPATIENT
Start: 2025-04-25 | End: 2025-04-26 | Stop reason: HOSPADM

## 2025-04-25 RX ORDER — SODIUM CHLORIDE 9 MG/ML
INJECTION, SOLUTION INTRAVENOUS PRN
Status: DISCONTINUED | OUTPATIENT
Start: 2025-04-25 | End: 2025-04-26 | Stop reason: HOSPADM

## 2025-04-25 RX ORDER — CHLORTHALIDONE 25 MG/1
25 TABLET ORAL DAILY
Status: CANCELLED | OUTPATIENT
Start: 2025-04-26

## 2025-04-25 RX ORDER — SODIUM CHLORIDE 9 MG/ML
INJECTION, SOLUTION INTRAVENOUS PRN
Status: DISCONTINUED | OUTPATIENT
Start: 2025-04-25 | End: 2025-04-25 | Stop reason: SDUPTHER

## 2025-04-25 RX ORDER — SODIUM CHLORIDE 0.9 % (FLUSH) 0.9 %
5-40 SYRINGE (ML) INJECTION PRN
Status: DISCONTINUED | OUTPATIENT
Start: 2025-04-25 | End: 2025-04-26 | Stop reason: HOSPADM

## 2025-04-25 RX ORDER — POTASSIUM CHLORIDE 29.8 MG/ML
20 INJECTION INTRAVENOUS
Status: COMPLETED | OUTPATIENT
Start: 2025-04-25 | End: 2025-04-25

## 2025-04-25 RX ORDER — SODIUM CHLORIDE 0.9 % (FLUSH) 0.9 %
5-40 SYRINGE (ML) INJECTION PRN
Status: DISCONTINUED | OUTPATIENT
Start: 2025-04-25 | End: 2025-04-25 | Stop reason: SDUPTHER

## 2025-04-25 RX ORDER — ATENOLOL 50 MG/1
50 TABLET ORAL DAILY
Status: CANCELLED | OUTPATIENT
Start: 2025-04-26

## 2025-04-25 RX ORDER — POTASSIUM CHLORIDE 7.45 MG/ML
10 INJECTION INTRAVENOUS
Status: DISPENSED | OUTPATIENT
Start: 2025-04-25 | End: 2025-04-25

## 2025-04-25 RX ORDER — POTASSIUM CHLORIDE 7.45 MG/ML
10 INJECTION INTRAVENOUS
Status: DISCONTINUED | OUTPATIENT
Start: 2025-04-25 | End: 2025-04-25

## 2025-04-25 RX ORDER — LIDOCAINE HYDROCHLORIDE 10 MG/ML
50 INJECTION, SOLUTION EPIDURAL; INFILTRATION; INTRACAUDAL; PERINEURAL ONCE
Status: COMPLETED | OUTPATIENT
Start: 2025-04-25 | End: 2025-04-25

## 2025-04-25 RX ADMIN — LIDOCAINE HYDROCHLORIDE ANHYDROUS 50 MG: 10 INJECTION, SOLUTION INFILTRATION at 10:28

## 2025-04-25 RX ADMIN — POTASSIUM CHLORIDE 20 MEQ: 29.8 INJECTION INTRAVENOUS at 16:29

## 2025-04-25 RX ADMIN — POTASSIUM BICARBONATE 40 MEQ: 782 TABLET, EFFERVESCENT ORAL at 11:21

## 2025-04-25 RX ADMIN — PIPERACILLIN AND TAZOBACTAM 3375 MG: 3; .375 INJECTION, POWDER, LYOPHILIZED, FOR SOLUTION INTRAVENOUS at 02:00

## 2025-04-25 RX ADMIN — PIPERACILLIN AND TAZOBACTAM 3375 MG: 3; .375 INJECTION, POWDER, LYOPHILIZED, FOR SOLUTION INTRAVENOUS at 16:35

## 2025-04-25 RX ADMIN — ENOXAPARIN SODIUM 30 MG: 100 INJECTION SUBCUTANEOUS at 21:10

## 2025-04-25 RX ADMIN — POTASSIUM BICARBONATE 40 MEQ: 782 TABLET, EFFERVESCENT ORAL at 06:36

## 2025-04-25 RX ADMIN — POTASSIUM CHLORIDE 20 MEQ: 29.8 INJECTION INTRAVENOUS at 20:41

## 2025-04-25 RX ADMIN — DAPTOMYCIN 700 MG: 500 INJECTION, POWDER, LYOPHILIZED, FOR SOLUTION INTRAVENOUS at 21:08

## 2025-04-25 RX ADMIN — ANTACID TABLETS 500 MG: 500 TABLET, CHEWABLE ORAL at 19:33

## 2025-04-25 RX ADMIN — PIPERACILLIN AND TAZOBACTAM 3375 MG: 3; .375 INJECTION, POWDER, LYOPHILIZED, FOR SOLUTION INTRAVENOUS at 08:36

## 2025-04-25 RX ADMIN — ENOXAPARIN SODIUM 30 MG: 100 INJECTION SUBCUTANEOUS at 08:33

## 2025-04-25 RX ADMIN — SODIUM CHLORIDE, PRESERVATIVE FREE 10 ML: 5 INJECTION INTRAVENOUS at 21:13

## 2025-04-25 RX ADMIN — SODIUM CHLORIDE, PRESERVATIVE FREE 10 ML: 5 INJECTION INTRAVENOUS at 21:11

## 2025-04-25 ASSESSMENT — ENCOUNTER SYMPTOMS
RESPIRATORY NEGATIVE: 1
GASTROINTESTINAL NEGATIVE: 1

## 2025-04-25 NOTE — PROGRESS NOTES
ID Follow-up NOTE    CC:   Complicated L foot infection  - cellulitis / abscess  Antibiotics: Zosyn, Daptomycin    Admit Date: 4/23/2025  Hospital Day: 3    Subjective:     Patient feels well  No L foot pain (severely neuropathic)      Objective:     Patient Vitals for the past 8 hrs:   BP Temp Temp src Pulse Resp SpO2   04/25/25 0827 133/77 98.2 °F (36.8 °C) Oral 70 17 99 %     I/O last 3 completed shifts:  In: 1061.1 [P.O.:480; I.V.:565; IV Piggyback:16.1]  Out: 2000 [Urine:2000]  I/O this shift:  In: -   Out: 300 [Urine:300]    EXAM:  GENERAL: No apparent distress.  RA  HEENT: Membranes moist, no oral lesion  NECK:  Supple, no lymphadenopathy  LUNGS: Clear b/l, no rales, no dullness  CARDIAC: RRR, no murmur appreciated  ABD:  + BS, soft / NT  EXT:  No rash, no edema, no lesions  L foot with dressing/ ACE  NEURO: No focal neurologic findings  PSYCH: Orientation, sensorium, mood normal  LINES:  Peripheral iv       Data Review:  Lab Results   Component Value Date    WBC 4.9 04/25/2025    HGB 16.7 04/25/2025    HCT 48.6 04/25/2025    MCV 88.8 04/25/2025     (L) 04/25/2025     Lab Results   Component Value Date    CREATININE 1.2 04/25/2025    BUN 21 (H) 04/25/2025     04/25/2025    K 2.8 (LL) 04/25/2025    CL 96 (L) 04/25/2025    CO2 29 04/25/2025       Hepatic Function Panel:   Lab Results   Component Value Date/Time    ALKPHOS 66 11/11/2024 01:47 PM    ALT 41 11/11/2024 01:47 PM    AST 35 11/11/2024 01:47 PM    BILITOT 0.4 11/11/2024 01:47 PM    BILIDIR 0.2 10/01/2024 10:12 AM    IBILI 0.1 10/01/2024 10:12 AM       9/21 ESR 90,      Micro:  9/8/17     Surgical cx Pseudomonas R cefepime, cipro, meropenem, pip  9/22/17   Wound cx heavy growth Ps aeruginosa, heavy Enterococcus faecalis  PSEUDOMONAS AERUGINOSA      Antibiotic Interpretation [facility] Unit     cefepime Resistant >16 mcg/mL     ciprofloxacin Resistant >2 mcg/mL     gentamicin Sensitive <=4 mcg/mL     meropenem Resistant >8 mcg/mL

## 2025-04-25 NOTE — PROGRESS NOTES
Podiatric Surgery Daily Progress Note  Dominick Aguayo      Subjective :   Patient seen and examined this am at the bedside. Patient denies any acute overnight events.  Patient denied pain at this time.  Patient is ready to discharge home and continue following up with Dr. Stepan Nick in outpatient setting.    Review of Systems: A 12 point review of symptoms is unremarkable with the exception of the chief complaint. Patient specifically denies nausea, fever, vomiting, chills, shortness of breath, chest pain, abdominal pain, constipation or difficulty urinating.       Objective     /81   Pulse 69   Temp 98 °F (36.7 °C) (Oral)   Resp 20   Ht 1.956 m (6' 5\")   Wt 112.8 kg (248 lb 11.2 oz)   SpO2 96%   BMI 29.49 kg/m²      I/O:  Intake/Output Summary (Last 24 hours) at 4/25/2025 0638  Last data filed at 4/25/2025 0633  Gross per 24 hour   Intake 805 ml   Output 1700 ml   Net -895 ml              Wt Readings from Last 3 Encounters:   04/23/25 112.8 kg (248 lb 11.2 oz)   10/01/24 110.5 kg (243 lb 9.7 oz)   03/12/24 113.9 kg (251 lb)       LABS:    Recent Labs     04/24/25  0421 04/25/25  0432   WBC 7.4 4.9   HGB 16.3 16.7   HCT 47.3 48.6   * 119*        Recent Labs     04/25/25  0432      K 2.8*   CL 96*   CO2 29   BUN 21*   CREATININE 1.2      No results for input(s): \"INR\", \"APTT\" in the last 72 hours.    Invalid input(s): \"PROT\"        LOWER EXTREMITY EXAMINATION    Dressing to right LE intact. No strikethrough noted to the external dressing.  Minimal hematogenous drainage noted to the internal layers of the dressing.     VASCULAR: DP and PT pulses palpable 2/4B/L. CFT is brisk to the digits of the foot b/l. Skin temperature is warm to warm from proximal to distal with focal calor noted to the dorsal and plantar aspect of the right foot, has improved. No edema noted. No pain with calf compression b/l.    NEUROLOGIC: Gross and epicritic sensation is absent b/l. Protective sensation is absent at

## 2025-04-25 NOTE — PLAN OF CARE
Problem: Discharge Planning  Goal: Discharge to home or other facility with appropriate resources  Outcome: Progressing  Flowsheets (Taken 4/24/2025 2000)  Discharge to home or other facility with appropriate resources: Identify barriers to discharge with patient and caregiver     Problem: Safety - Adult  Goal: Free from fall injury  Outcome: Progressing  Flowsheets (Taken 4/25/2025 0342)  Free From Fall Injury: Instruct family/caregiver on patient safety     Problem: ABCDS Injury Assessment  Goal: Absence of physical injury  Outcome: Progressing

## 2025-04-25 NOTE — CARE COORDINATION
Patient was going to d/c home today and orders were called to [facility]. They planned to deliver today but patient was not able to d/c today due to low potassium. [facility] has accepted for for home care and was also notified that he would not be able to d/c to home today but should go home tomorrow. He will need a Lyft to home and address was confirmed.     Electronically signed by Thania Rivera RN on 4/25/2025 at 5:25 PM  [phone]

## 2025-04-25 NOTE — CARE COORDINATION
Case Management Assessment            Discharge Note                    Date / Time of Note: 4/25/2025 3:15 PM                  Discharge Note Completed by: Thania Rivera RN    Patient Name: Dominick Aguayo   YOB: 1950  Diagnosis: Cellulitis of right foot [L03.115]  Right foot infection [L08.9]   Date / Time: 4/23/2025  6:23 PM    Current PCP: Van Egan MD  Clinic patient: No    Hospitalization in the last 30 days: No       Advance Directives:  Code Status: Full Code  [place] DNR form completed and on chart: No    Financial:  Payor: HUMANA MEDICARE / Plan: HUMANA GOLD PLUS HMO / Product Type: *No Product type* /      Pharmacy:    [facility] - [place], [place] - [address] - P [phone] - F [phone] [address] [address]  Phone: [phone] Fax: [phone]    [id number] - [place], [place] - [address] - P [phone] - F [phone]  [address] [address]  Phone: [phone] Fax: [phone]      Assistance purchasing medications?: Potential Assistance Purchasing Medications: No  Assistance provided by Case Management: None at this time    Does patient want to participate in local refill/ meds to beds program?:      Meds To Beds General Rules:  1. Can ONLY be done Monday- Friday between 8:30am-5pm  2. Prescription(s) must be in pharmacy by 3pm to be filled same day  3.Copy of patient's insurance/ prescription drug card and patient face sheet must be sent along with the prescription(s)  4. Cost of Rx cannot be added to hospital bill. If financial assistance is needed, please contact unit  or ;  or  CANNOT provide pharmacy voucher for patients co-pays  5. Patients can then  the prescription on their way out of the hospital at discharge, or pharmacy can deliver to the bedside if staff is available. (payment due at time of pick-up or  delivery - cash, check, or card accepted)     Able to afford home medications/ co-pay costs: Yes    ADLS:  Current PT AM-PAC Score:   /24  Current OT AM-PAC Score:   /24    DISCHARGE Disposition: Home with Home Health Care: [facility] and Home Infusion: [facility] Phone: [phone] Fax: [phone]    LOC at discharge: Not Applicable  ALESIA Completed: No    Notification completed in HENS/PAS?:  No    IMM Completed:   Not Indicated due to: 04/23/25    IMM Letter date given:: 04/23/25  IMM Letter time given:: 2043    Transportation:  Transportation PLAN for discharge:  will need Lyft    Mode of Transport: Lyft  Reason for medical transport: Not Applicable  Name of Transport Company: Not Applicable  Time of Transport: TBD      Home Care:  Home Care ordered at discharge: Yes  Home Care Agency: [facility]  Phone: [phone] Fax: [phone]  Orders faxed: Yes      Additional CM Notes: Patient will d/c to home with [facility] and [facility] for IV ABX and nursing care. I called [facility] and they are able to pull orders for start of care. Called [facility] and they are going to deliver to the patient tonight for start of care tomorrow. Patient will need Lyft to home which can be arranged and confirmed his address.     COVID Result:    Lab Results   Component Value Date/Time    COVID19 NOT DETECTED 09/21/2024 04:38 PM       The Plan for Transition of Care is related to the following treatment goals of Cellulitis of right foot [L03.115]  Right foot infection [L08.9]    The Patient and/or patient representative Dominick and his family were provided with a choice of provider and agrees with the discharge plan Yes    Freedom of choice list was provided with basic dialogue that supports the patient's individualized plan of care/goals and shares the quality data associated with the providers. Yes    Care Transitions patient: No    Thania Rivera RN  [facility]  Case Management Department  Ph:

## 2025-04-25 NOTE — PROGRESS NOTES
Internal Medicine Progress Note    Patient: Dominick Aguayo   : 1950   Admit Date: 2025     Date: 2025     Interval History     Patient seen and examined at bedside. PICC team consulted. Overall feeling well. Replacing potassium       ROS     Review of Systems   Constitutional:         Rigors   HENT: Negative.     Respiratory: Negative.     Cardiovascular: Negative.    Gastrointestinal: Negative.    Genitourinary: Negative.    Musculoskeletal: Negative.    Hematological: Negative.         Objective     I/Os:  I/O last 3 completed shifts:  In: 1061.1 [P.O.:480; I.V.:565; IV Piggyback:16.1]  Out:  [Urine:]    Vital Signs:  Patient Vitals for the past 5 hrs:   BP Temp Temp src Pulse Resp SpO2   25 1124 (!) 140/76 97.7 °F (36.5 °C) Oral 73 16 96 %   25 0827 133/77 98.2 °F (36.8 °C) Oral 70 17 99 %       Physical Exam:  Physical Exam  Vitals reviewed.   Constitutional:       Appearance: Normal appearance.   HENT:      Head: Normocephalic.      Mouth/Throat:      Mouth: Mucous membranes are moist.      Pharynx: Oropharynx is clear.   Eyes:      Pupils: Pupils are equal, round, and reactive to light.   Neck:      Vascular: No carotid bruit.   Cardiovascular:      Rate and Rhythm: Normal rate and regular rhythm.   Pulmonary:      Effort: Pulmonary effort is normal.      Breath sounds: Normal breath sounds.   Abdominal:      General: Abdomen is flat. Bowel sounds are normal.      Palpations: Abdomen is soft. There is no mass.      Hernia: No hernia is present.   Musculoskeletal:      Cervical back: Normal range of motion.      Right lower leg: No edema.      Left lower leg: No edema.      Comments: erythema, warmth, swelling R foot&ankle. Dark, ~5mm, circular wound on plantar aspect of R foot   Lymphadenopathy:      Cervical: No cervical adenopathy.   Skin:     Findings: Erythema (R foot) present.   Neurological:      Mental Status: He is alert and oriented to person, place, and

## 2025-04-25 NOTE — CONSULTS
Clinical Pharmacy Progress Note    Vancomycin has been discontinued. Will sign off pharmacy to dose vancomycin  consult.  If medication is restarted and pharmacy is to manage dosing, please re-consult at that time.    Devin LAW [facility]  Main [facility] [id number]   4/24/2025 8:12 PM

## 2025-04-25 NOTE — DISCHARGE INSTR - DIET

## 2025-04-26 VITALS
HEIGHT: 77 IN | HEART RATE: 81 BPM | TEMPERATURE: 97.9 F | BODY MASS INDEX: 29.37 KG/M2 | DIASTOLIC BLOOD PRESSURE: 85 MMHG | WEIGHT: 248.7 LBS | OXYGEN SATURATION: 98 % | RESPIRATION RATE: 16 BRPM | SYSTOLIC BLOOD PRESSURE: 160 MMHG

## 2025-04-26 LAB
ANION GAP SERPL CALCULATED.3IONS-SCNC: 10 MMOL/L (ref 3–16)
BASOPHILS # BLD: 0 K/UL (ref 0–0.2)
BASOPHILS NFR BLD: 1.2 %
BUN SERPL-MCNC: 19 MG/DL (ref 7–20)
CALCIUM SERPL-MCNC: 8.8 MG/DL (ref 8.3–10.6)
CHLORIDE SERPL-SCNC: 100 MMOL/L (ref 99–110)
CO2 SERPL-SCNC: 28 MMOL/L (ref 21–32)
CREAT SERPL-MCNC: 1.1 MG/DL (ref 0.8–1.3)
DEPRECATED RDW RBC AUTO: 14.7 % (ref 12.4–15.4)
EOSINOPHIL # BLD: 0.2 K/UL (ref 0–0.6)
EOSINOPHIL NFR BLD: 6.4 %
GFR SERPLBLD CREATININE-BSD FMLA CKD-EPI: 70 ML/MIN/{1.73_M2}
GLUCOSE SERPL-MCNC: 120 MG/DL (ref 70–99)
HCT VFR BLD AUTO: 44.9 % (ref 40.5–52.5)
HGB BLD-MCNC: 15.1 G/DL (ref 13.5–17.5)
LYMPHOCYTES # BLD: 0.6 K/UL (ref 1–5.1)
LYMPHOCYTES NFR BLD: 17.4 %
MAGNESIUM SERPL-MCNC: 2.04 MG/DL (ref 1.8–2.4)
MCH RBC QN AUTO: 30 PG (ref 26–34)
MCHC RBC AUTO-ENTMCNC: 33.6 G/DL (ref 31–36)
MCV RBC AUTO: 89.4 FL (ref 80–100)
MONOCYTES # BLD: 0.4 K/UL (ref 0–1.3)
MONOCYTES NFR BLD: 12.9 %
NEUTROPHILS # BLD: 2 K/UL (ref 1.7–7.7)
NEUTROPHILS NFR BLD: 62.1 %
PLATELET # BLD AUTO: 113 K/UL (ref 135–450)
PMV BLD AUTO: 8.4 FL (ref 5–10.5)
POTASSIUM SERPL-SCNC: 3.4 MMOL/L (ref 3.5–5.1)
POTASSIUM SERPL-SCNC: 4 MMOL/L (ref 3.5–5.1)
RBC # BLD AUTO: 5.03 M/UL (ref 4.2–5.9)
SODIUM SERPL-SCNC: 138 MMOL/L (ref 136–145)
WBC # BLD AUTO: 3.2 K/UL (ref 4–11)

## 2025-04-26 PROCEDURE — 80048 BASIC METABOLIC PNL TOTAL CA: CPT

## 2025-04-26 PROCEDURE — 99239 HOSP IP/OBS DSCHRG MGMT >30: CPT | Performed by: HOSPITALIST

## 2025-04-26 PROCEDURE — 2500000003 HC RX 250 WO HCPCS

## 2025-04-26 PROCEDURE — 6360000002 HC RX W HCPCS

## 2025-04-26 PROCEDURE — 84132 ASSAY OF SERUM POTASSIUM: CPT

## 2025-04-26 PROCEDURE — 2580000003 HC RX 258

## 2025-04-26 PROCEDURE — 85025 COMPLETE CBC W/AUTO DIFF WBC: CPT

## 2025-04-26 PROCEDURE — 83735 ASSAY OF MAGNESIUM: CPT

## 2025-04-26 PROCEDURE — 6370000000 HC RX 637 (ALT 250 FOR IP)

## 2025-04-26 RX ORDER — POTASSIUM CHLORIDE 1500 MG/1
20 TABLET, EXTENDED RELEASE ORAL DAILY
Qty: 7 TABLET | Refills: 0 | Status: SHIPPED | OUTPATIENT
Start: 2025-04-26 | End: 2025-05-03

## 2025-04-26 RX ORDER — POTASSIUM CHLORIDE 29.8 MG/ML
20 INJECTION INTRAVENOUS
Status: COMPLETED | OUTPATIENT
Start: 2025-04-26 | End: 2025-04-26

## 2025-04-26 RX ADMIN — POTASSIUM BICARBONATE 40 MEQ: 782 TABLET, EFFERVESCENT ORAL at 06:51

## 2025-04-26 RX ADMIN — Medication 10 ML: at 10:02

## 2025-04-26 RX ADMIN — POTASSIUM CHLORIDE 20 MEQ: 29.8 INJECTION INTRAVENOUS at 11:53

## 2025-04-26 RX ADMIN — SODIUM CHLORIDE, PRESERVATIVE FREE 10 ML: 5 INJECTION INTRAVENOUS at 10:03

## 2025-04-26 RX ADMIN — PIPERACILLIN AND TAZOBACTAM 3375 MG: 3; .375 INJECTION, POWDER, LYOPHILIZED, FOR SOLUTION INTRAVENOUS at 01:30

## 2025-04-26 RX ADMIN — POTASSIUM CHLORIDE 20 MEQ: 29.8 INJECTION INTRAVENOUS at 10:09

## 2025-04-26 RX ADMIN — ENOXAPARIN SODIUM 30 MG: 100 INJECTION SUBCUTANEOUS at 10:02

## 2025-04-26 RX ADMIN — PIPERACILLIN AND TAZOBACTAM 3375 MG: 3; .375 INJECTION, POWDER, LYOPHILIZED, FOR SOLUTION INTRAVENOUS at 09:59

## 2025-04-26 NOTE — PROGRESS NOTES
Podiatric Surgery Daily Progress Note  Dominick Aguayo      Subjective :   Patient seen and examined this am at the bedside. Patient denies any acute overnight events.  Patient denied pain at this time.  Patient is ready to discharge home and continue following up with Dr. Stepan Nick or Dr. Jonathan Lane in the outpatient setting.    Review of Systems: A 12 point review of symptoms is unremarkable with the exception of the chief complaint. Patient specifically denies nausea, fever, vomiting, chills, shortness of breath, chest pain, abdominal pain, constipation or difficulty urinating.       Objective     BP (!) 151/79   Pulse 70   Temp 97.7 °F (36.5 °C) (Oral)   Resp 17   Ht 1.956 m (6' 5\")   Wt 112.8 kg (248 lb 11.2 oz)   SpO2 96%   BMI 29.49 kg/m²      I/O:  Intake/Output Summary (Last 24 hours) at 4/26/2025 1037  Last data filed at 4/26/2025 0832  Gross per 24 hour   Intake --   Output 1200 ml   Net -1200 ml              Wt Readings from Last 3 Encounters:   04/23/25 112.8 kg (248 lb 11.2 oz)   10/01/24 110.5 kg (243 lb 9.7 oz)   03/12/24 113.9 kg (251 lb)       LABS:    Recent Labs     04/25/25  0432 04/26/25  0539   WBC 4.9 3.2*   HGB 16.7 15.1   HCT 48.6 44.9   * 113*        Recent Labs     04/26/25  0539      K 3.4*      CO2 28   BUN 19   CREATININE 1.1      No results for input(s): \"INR\", \"APTT\" in the last 72 hours.    Invalid input(s): \"PROT\"        LOWER EXTREMITY EXAMINATION    Dressing to right LE intact. No strikethrough noted to the external dressing.  Minimal hematogenous drainage noted to the internal layers of the dressing.     VASCULAR: DP and PT pulses palpable 2/4B/L. CFT is brisk to the digits of the foot b/l. Skin temperature is warm to warm from proximal to distal with focal calor noted to the dorsal and plantar aspect of the right foot, has improved. No edema noted. No pain with calf compression b/l.    NEUROLOGIC: Gross and epicritic sensation is absent b/l.

## 2025-04-26 NOTE — DISCHARGE SUMMARY
INTERNAL MEDICINE DEPARTMENT AT THE [facility]  DISCHARGE SUMMARY    Patient:  Dominick Aguayo                                             Discharge Date:  4/26/2025  Patient's PCP:  Van Egan MD                                          Discharge Physician:  Patrick Chaney MD  Admit Date:  4/23/2025   Admitting Physician:  Patrick Chaney MD      DISCHARGE DIAGNOSES:  Present on Admission:  Full thickness ulceration with abscess, right foot  Cellulitis of right foot  Hypokalemia  Idiopathic neuropathy  History of amputation of foot, right  History of amputation of foot, left  Abscess of right foot    Follow-up Appointments:  Primary care physician within 1 week of discharge to recheck potassium level  Follow-up with Stepan Nick DPM      Hospital Course:  Mr. Dominick Aguayo is a 74 y.o. 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    Right foot x-ray demonstrated soft tissue swelling with no acute osseous findings, for which Zosyn was initiated. His rigors subsided and comfort level improved. MRI confirmed a right foot soft tissue infection, a multi-septated abscess, and tenosynovitis. No marrow signal alteration was noted, suggesting a diagnosis of cellulitis. Vancomycin was added to the antibiotic regimen to cover for MRSA. Podiatry was consulted and I&D was performed at bedside; cultures from the right foot abscess did not grow any aerobic organism (anaerobes pending). Infectious disease was consulted, then discontinued vancomycin and instead, initiated daptomycin due to his history of CKD.     On 4/24/25, his potassium level decreased to 2.8, delaying discharge. It was repleted and

## 2025-04-26 NOTE — PROGRESS NOTES
Patient discharge education complete. Patient verbalized understanding of medications and side effects at time of discharge. All questions answered at this time.  .Electronically signed by Tra Lucas RN on 4/26/2025 at 4:42 PM

## 2025-04-26 NOTE — PROGRESS NOTES
Patient alert and oriented x4. Patient denies any pain or nausea. Administrating iv abx and replaced potassium per order. Dressing on right foot dci. Assessment done.see flowsheet. Patient in bed lowest position call light and bedside table within reach. All needs are met at this time. Patient aware to call if any help needed.Plan of care ongoing  BP (!) 155/71   Pulse 74   Temp 97.9 °F (36.6 °C) (Oral)   Resp 16   Ht 1.956 m (6' 5\")   Wt 112.8 kg (248 lb 11.2 oz)   SpO2 97%   BMI 29.49 kg/m²

## 2025-04-26 NOTE — CARE COORDINATION
Case Management Assessment            Discharge Note                    Date / Time of Note: 4/26/2025 12:22 PM                  Discharge Note Completed by: Cole Anglin RN    Patient Name: Dominick Aguayo   YOB: 1950  Diagnosis: Cellulitis of right foot [L03.115]  Right foot infection [L08.9]   Date / Time: 4/23/2025  6:23 PM    Current PCP: Van Egan MD  Clinic patient: Yes    Hospitalization in the last 30 days: No       Advance Directives:  Code Status: Full Code  [place] DNR form completed and on chart: Not Indicated    Financial:  Payor: HUMANA MEDICARE / Plan: HUMANA GOLD PLUS HMO / Product Type: *No Product type* /      Pharmacy:    [facility] - [facility] [address] - P [phone] - F [phone] [address] [address]  Phone: [phone] Fax: [phone]    [facility] #[id number] - [place], [place] - [address] - P [phone] - F [phone]  [address] [address]  Phone: [phone] Fax: [phone]      Assistance purchasing medications?: Potential Assistance Purchasing Medications: No  Assistance provided by Case Management: None at this time    Does patient want to participate in local refill/ meds to beds program?:      Meds To Beds General Rules:  1. Can ONLY be done Monday- Friday between 8:30am-5pm  2. Prescription(s) must be in pharmacy by 3pm to be filled same day  3.Copy of patient's insurance/ prescription drug card and patient face sheet must be sent along with the prescription(s)  4. Cost of Rx cannot be added to hospital bill. If financial assistance is needed, please contact unit  or ;  or  CANNOT provide pharmacy voucher for patients co-pays  5. Patients can then  the prescription on their way out of the hospital at discharge, or pharmacy can deliver to the bedside if staff is available. (payment due at time of  pick-up or delivery - cash, check, or card accepted)     Able to afford home medications/ co-pay costs: Yes    ADLS:  Current PT AM-PAC Score:   /24  Current OT AM-PAC Score:   /24    DISCHARGE Disposition: Home [facility] Phone: . Fax: .    LOC at discharge: Not Applicable  ALESIA Completed: Yes    Notification completed in HENS/PAS?:  Not Applicable    IMM Completed:   Yes, Case management has presented and reviewed IMM letter #2 to the patient and/or family/ POA. Patient and/or family/POA verbalized understanding of their medicare rights and appeal process if needed. Patient and/or family/POA verbalized understanding of DC within 4 hours of signing IMM letter #2. Patient and/or family/POA has signed and placed today's date (4/25) and time (1645) on IMM letter #2 on the the appropriate lines. Patient and/or family/POA, provided copy of letter and they are aware that original copy of IMM letter #2 is available prior to discharge from the paper chart on the unit.  Electronic documentation has been entered into epic for IMM letter #2 and original paper copy has been added to the paper chart at the nurses station.    IMM Letter given to Patient/Family/Significant other/Guardian/POA/by:: Thania Rivera  IMM Letter date given:: 04/25/25  IMM Letter time given:: 1645    Transportation:  Transportation PLAN for discharge:  lyft    Mode of Transport: Lyft    Home Care:  Home Care ordered at discharge: Yes  Home Care Agency: [facility]  Phone: [phone] Fax: [phone]  Orders faxed: Yes      Additional CM Notes: [facility] to [facility]. Both companies notified of patient dc. RN notified to let CM know when patient is ready and CM will arrange LYFT    COVID Result:    Lab Results   Component Value Date/Time    COVID19 NOT DETECTED 09/21/2024 04:38 PM       The Plan for Transition of Care is related to the following treatment goals of

## 2025-04-27 LAB
BACTERIA BLD CULT ORG #2: NORMAL
BACTERIA BLD CULT: NORMAL
BACTERIA SPEC AEROBE CULT: ABNORMAL
BACTERIA SPEC ANAEROBE CULT: ABNORMAL
GRAM STN SPEC: ABNORMAL
ORGANISM: ABNORMAL

## 2025-04-28 ENCOUNTER — CARE COORDINATION (OUTPATIENT)
Dept: CASE MANAGEMENT | Age: 75
End: 2025-04-28

## 2025-04-28 ENCOUNTER — TELEPHONE (OUTPATIENT)
Dept: INFECTIOUS DISEASES | Age: 75
End: 2025-04-28

## 2025-04-28 DIAGNOSIS — L03.115 CELLULITIS OF RIGHT FOOT: Primary | ICD-10-CM

## 2025-04-28 DIAGNOSIS — L02.611 ABSCESS OF RIGHT FOOT: ICD-10-CM

## 2025-04-28 NOTE — TELEPHONE ENCOUNTER
Left vm for Cruz clinical pharmacist with [facility], asking for a return call to verify opat orders and add Dapto and CK to regimen  Zosyn 13.5gm CI through 5/15/25  Daptomycin 700 mg daily thru 5/15/25  CBC w diff, CMP, ESR, CRP, CK every Mon or Tue, FAX results to [phone]  My name, title, Dr Green's name, specialty and affiliation  Pt name and +  My direct number [phone]    Spoke with Wendy at [facility] and gave VO to add CK to weekly labs and that we are adding Daptomycin 700 mg daily through 5/15  [facility] RBVO correctly    OPAT Nurse Coordinator Weekly Update Note    Current OPAT plan:  Zosyn 13.5gm CI through 5/15/25  Daptomycin 700 mg daily thru 5/15/25    Diagnosis:  R foot cellulitis/abscess    Assessment:  spoke with pt.  Notified him we are adding Daptomycin 700mg to his routine.  Educated pt on administering dapto between Zosyn doses.    - after zosyn completes for the day, flush line with sailine  - administer Dapto  - flush with sailine  - attach new zosyn dose  Pt repeated back steps correctly  Pt is tolerating IV ATB without adverse SE.  Pt requested [facility] visits to [facility] and [facility] was \"hired\" to see pt 2x weekly  Pt curently not scheduled with podiatry - will make appt today  Pt states he was not pleased with anything about this hospitalization \"I love [facility], I was [place] [facility], but this hospitalization was horrible\"      Follow up appts:  needs podiatry follow up

## 2025-04-28 NOTE — TELEPHONE ENCOUNTER
Known to ID for BL TMA cellulitis.  Prior cx with prevotella, EFaecalis, strep, peptoniphilus.  Had rash with unasyn. Tolerated zosyn.  Recently d/c on zosyn.     Now new cx with MRSA.  Please add IV dapto 700 mg daily to IV zosyn.   Add CK to labs.  Same stop date of 5/15.    Giovana Houston, PharmD, BCPS  Clinical Pharmacy Specialist- [facility]ious [facility]  Phone: [phone] (also available on [employer])  Fax: [phone]    For Pharmacy Admin Tracking Only    Program: Medical Group  CPA in place: Yes  Intervention Detail: New Rx: 1, reason: Needs Additional Therapy  Time Spent (min): 15

## 2025-04-28 NOTE — CARE COORDINATION
Care Transitions Note    Initial Call - Call within 2 business days of discharge: Yes    Attempted to reach patient for transitions of care follow up. Unable to reach patient.    Outreach Attempts:   HIPAA compliant voicemail left for patient.     Patient: Dominick Aguayo    Patient : 1950   MRN: [id number]    Reason for Admission: cellulitis Rt foot   Discharge Date: 25  RURS: Readmission Risk Score: 8.5    Last Discharge Facility       Date Complaint Diagnosis Description Type Department Provider    25 Wound Infection Right foot infection ... ED to Hosp-Admission (Discharged) (ADMITTED) [facility] Patrick Oliveira MD; Daniel Llanes..            Was this an external facility discharge? No    Follow Up Appointment:   Patient does not have a follow up appointment scheduled at time of call.  Utr msg pcp pool       Plan for follow-up on next business day.      Austin Cummins RN

## 2025-04-28 NOTE — TELEPHONE ENCOUNTER
I called patient to assist him in scheduling a follow up appointment with  , however the patient is asking why does he need to follow with  when he is following up with ?    I will verify with  to see if the patient really needs to see him.

## 2025-04-29 ENCOUNTER — CARE COORDINATION (OUTPATIENT)
Dept: CASE MANAGEMENT | Age: 75
End: 2025-04-29

## 2025-04-29 LAB
ALBUMIN SERPL-MCNC: 4.1 G/DL (ref 3.4–5)
ALBUMIN/GLOB SERPL: 1.8 {RATIO} (ref 1.1–2.2)
ALP SERPL-CCNC: 59 U/L (ref 40–129)
ALT SERPL-CCNC: 48 U/L (ref 10–40)
ANION GAP SERPL CALCULATED.3IONS-SCNC: 11 MMOL/L (ref 3–16)
AST SERPL-CCNC: 27 U/L (ref 15–37)
BACTERIA SPEC AEROBE CULT: ABNORMAL
BACTERIA SPEC ANAEROBE CULT: ABNORMAL
BASOPHILS # BLD: 0.1 K/UL (ref 0–0.2)
BASOPHILS NFR BLD: 1.1 %
BILIRUB SERPL-MCNC: 0.5 MG/DL (ref 0–1)
BUN SERPL-MCNC: 23 MG/DL (ref 7–20)
CALCIUM SERPL-MCNC: 9 MG/DL (ref 8.3–10.6)
CHLORIDE SERPL-SCNC: 100 MMOL/L (ref 99–110)
CK SERPL-CCNC: 41 U/L (ref 39–308)
CO2 SERPL-SCNC: 27 MMOL/L (ref 21–32)
CREAT SERPL-MCNC: 1.1 MG/DL (ref 0.8–1.3)
CRP SERPL-MCNC: 16.5 MG/L (ref 0–5.1)
DEPRECATED RDW RBC AUTO: 14.5 % (ref 12.4–15.4)
EOSINOPHIL # BLD: 0.2 K/UL (ref 0–0.6)
EOSINOPHIL NFR BLD: 3.6 %
ERYTHROCYTE [SEDIMENTATION RATE] IN BLOOD BY WESTERGREN METHOD: 25 MM/HR (ref 0–20)
GFR SERPLBLD CREATININE-BSD FMLA CKD-EPI: 70 ML/MIN/{1.73_M2}
GLUCOSE SERPL-MCNC: 107 MG/DL (ref 70–99)
GRAM STN SPEC: ABNORMAL
HCT VFR BLD AUTO: 47 % (ref 40.5–52.5)
HGB BLD-MCNC: 16 G/DL (ref 13.5–17.5)
LYMPHOCYTES # BLD: 1.1 K/UL (ref 1–5.1)
LYMPHOCYTES NFR BLD: 23.2 %
MAGNESIUM SERPL-MCNC: 2.03 MG/DL (ref 1.8–2.4)
MCH RBC QN AUTO: 30 PG (ref 26–34)
MCHC RBC AUTO-ENTMCNC: 34.1 G/DL (ref 31–36)
MCV RBC AUTO: 87.9 FL (ref 80–100)
MONOCYTES # BLD: 0.5 K/UL (ref 0–1.3)
MONOCYTES NFR BLD: 9.9 %
NEUTROPHILS # BLD: 3 K/UL (ref 1.7–7.7)
NEUTROPHILS NFR BLD: 62.2 %
ORGANISM: ABNORMAL
PHOSPHATE SERPL-MCNC: 2.7 MG/DL (ref 2.5–4.9)
PLATELET # BLD AUTO: 155 K/UL (ref 135–450)
PMV BLD AUTO: 8.8 FL (ref 5–10.5)
POTASSIUM SERPL-SCNC: 4.1 MMOL/L (ref 3.5–5.1)
PROT SERPL-MCNC: 6.4 G/DL (ref 6.4–8.2)
RBC # BLD AUTO: 5.34 M/UL (ref 4.2–5.9)
SODIUM SERPL-SCNC: 138 MMOL/L (ref 136–145)
WBC # BLD AUTO: 4.8 K/UL (ref 4–11)

## 2025-04-29 NOTE — TELEPHONE ENCOUNTER
Spoke to pt he stated he does not need a follow up as of right now because he is following up with Dr. Green

## 2025-04-29 NOTE — CARE COORDINATION
Care Transitions Note    Initial Call - Call within 2 business days of discharge: Yes    Attempted to reach patient for transitions of care follow up. Unable to reach patient.    Outreach Attempts:   HIPAA compliant voicemail left for patient.     Patient: Dominick Aguayo    Patient : 1950   MRN: [id number]    Reason for Admission: right foot infection   Discharge Date: 25  RURS: Readmission Risk Score: 8.5    Last Discharge Facility       Date Complaint Diagnosis Description Type Department Provider    25 Wound Infection Right foot infection ... ED to Hosp-Admission (Discharged) (ADMITTED) [facility] Patrick Oliveira MD; Daniel Llanes..            Was this an external facility discharge? No    Follow Up Appointment:   Patient does not have a follow up appointment scheduled at time of call.       No further follow-up call indicated - CT episode closed - CTN signed off pt.       Austin Cummins RN

## 2025-05-05 ENCOUNTER — TELEPHONE (OUTPATIENT)
Dept: INFECTIOUS DISEASES | Age: 75
End: 2025-05-05

## 2025-05-05 ENCOUNTER — CARE COORDINATION (OUTPATIENT)
Dept: CASE MANAGEMENT | Age: 75
End: 2025-05-05

## 2025-05-05 NOTE — CARE COORDINATION
CTN returned pt's call and left contact information and Patient Relations Number    DELMY Enriquez, RN  Care Transition Coordinator  Contact Number:  [phone]

## 2025-05-05 NOTE — TELEPHONE ENCOUNTER
OPAT Nurse Coordinator Weekly Update Note    Current OPAT plan:  Zosyn 13.5gm CI through 5/15/25  Daptomycin 700 mg daily thru 5/15/25    Diagnosis:  R foot cellulitis/abscess    Assessment: spoke with pt.  He is doing well and tolerating IV ATB without adverse SE.  Pt states his \"foot is fine\".  Pt says the podiatry team had to \"slice it open to drain the infecrtion and they say it looks good, just going to take time to heal\".    Follow up appts:   Pt is looking for a new podiatrist.

## 2025-05-06 LAB
ALBUMIN SERPL-MCNC: 4.1 G/DL (ref 3.4–5)
ALBUMIN/GLOB SERPL: 2.1 {RATIO} (ref 1.1–2.2)
ALP SERPL-CCNC: 53 U/L (ref 40–129)
ALT SERPL-CCNC: 36 U/L (ref 10–40)
ANION GAP SERPL CALCULATED.3IONS-SCNC: 12 MMOL/L (ref 3–16)
AST SERPL-CCNC: 26 U/L (ref 15–37)
BASOPHILS # BLD: 0.1 K/UL (ref 0–0.2)
BASOPHILS NFR BLD: 1.4 %
BILIRUB SERPL-MCNC: 0.4 MG/DL (ref 0–1)
BUN SERPL-MCNC: 19 MG/DL (ref 7–20)
CALCIUM SERPL-MCNC: 8.9 MG/DL (ref 8.3–10.6)
CHLORIDE SERPL-SCNC: 100 MMOL/L (ref 99–110)
CK SERPL-CCNC: 31 U/L (ref 39–308)
CO2 SERPL-SCNC: 27 MMOL/L (ref 21–32)
CREAT SERPL-MCNC: 1.1 MG/DL (ref 0.8–1.3)
CRP SERPL-MCNC: 10.1 MG/L (ref 0–5.1)
DEPRECATED RDW RBC AUTO: 15.1 % (ref 12.4–15.4)
EOSINOPHIL # BLD: 0.2 K/UL (ref 0–0.6)
EOSINOPHIL NFR BLD: 3.7 %
ERYTHROCYTE [SEDIMENTATION RATE] IN BLOOD BY WESTERGREN METHOD: 17 MM/HR (ref 0–20)
GFR SERPLBLD CREATININE-BSD FMLA CKD-EPI: 70 ML/MIN/{1.73_M2}
GLUCOSE SERPL-MCNC: 109 MG/DL (ref 70–99)
HCT VFR BLD AUTO: 47.4 % (ref 40.5–52.5)
HGB BLD-MCNC: 16.1 G/DL (ref 13.5–17.5)
LYMPHOCYTES # BLD: 1.1 K/UL (ref 1–5.1)
LYMPHOCYTES NFR BLD: 19.8 %
MCH RBC QN AUTO: 30.3 PG (ref 26–34)
MCHC RBC AUTO-ENTMCNC: 34 G/DL (ref 31–36)
MCV RBC AUTO: 89.1 FL (ref 80–100)
MONOCYTES # BLD: 0.5 K/UL (ref 0–1.3)
MONOCYTES NFR BLD: 9.2 %
NEUTROPHILS # BLD: 3.8 K/UL (ref 1.7–7.7)
NEUTROPHILS NFR BLD: 65.9 %
PLATELET # BLD AUTO: 189 K/UL (ref 135–450)
PMV BLD AUTO: 8.8 FL (ref 5–10.5)
POTASSIUM SERPL-SCNC: 4.2 MMOL/L (ref 3.5–5.1)
PROT SERPL-MCNC: 6.1 G/DL (ref 6.4–8.2)
RBC # BLD AUTO: 5.32 M/UL (ref 4.2–5.9)
SODIUM SERPL-SCNC: 139 MMOL/L (ref 136–145)
WBC # BLD AUTO: 5.8 K/UL (ref 4–11)

## 2025-05-07 RX ORDER — POTASSIUM CHLORIDE 1500 MG/1
TABLET, EXTENDED RELEASE ORAL
Qty: 7 TABLET | Refills: 0 | OUTPATIENT
Start: 2025-05-07

## 2025-05-07 RX ORDER — ATENOLOL AND CHLORTHALIDONE TABLET 50; 25 MG/1; MG/1
1 TABLET ORAL DAILY
Qty: 90 TABLET | Refills: 3 | Status: SHIPPED | OUTPATIENT
Start: 2025-05-07

## 2025-05-09 ENCOUNTER — TELEPHONE (OUTPATIENT)
Dept: INTERNAL MEDICINE CLINIC | Age: 75
End: 2025-05-09

## 2025-05-09 NOTE — TELEPHONE ENCOUNTER
[facility] Dara is calling to request orders for a mobile wound healing to come out and for a wound on the bottom of his foot   Contact [phone] call back

## 2025-05-12 ENCOUNTER — TELEPHONE (OUTPATIENT)
Dept: INFECTIOUS DISEASES | Age: 75
End: 2025-05-12

## 2025-05-12 ENCOUNTER — TELEPHONE (OUTPATIENT)
Dept: INTERNAL MEDICINE CLINIC | Age: 75
End: 2025-05-12

## 2025-05-12 DIAGNOSIS — Z89.432 HISTORY OF AMPUTATION OF FOOT, LEFT (HCC): Primary | ICD-10-CM

## 2025-05-12 NOTE — TELEPHONE ENCOUNTER
OPAT Nurse Coordinator Weekly Update Note    Current OPAT plan:  Zosyn 13.5gm CI through 5/15/25  Daptomycin 700 mg daily thru 5/15/25    Diagnosis:  -R foot cellulitis/abscess      Assessment:  [facility] nurse does not like how wound is looking.  Pt is \"I between\" podiatrist.  [facility] is thinking wound vac may be needed.  Pt is asking if oral atb will be ordered after ending the IV ATB?  Pt s concerned if he is not one an oral, the \"flare will happen again and (he) will be back on IV ATB\"  I let pt know we will discuss this week in our meeting and someone will get back to him.  Pt is tolerating IV ATB without adverse SE.    Follow up appts:

## 2025-05-12 NOTE — TELEPHONE ENCOUNTER
Yue from [facility] states that she needs referral for Mobile Wound Healing for right foot wound # [phone]    [phone] (Yue)  [facility]

## 2025-05-14 ENCOUNTER — TELEPHONE (OUTPATIENT)
Dept: INTERNAL MEDICINE CLINIC | Age: 75
End: 2025-05-14

## 2025-05-14 LAB
ALBUMIN SERPL-MCNC: 4.4 G/DL (ref 3.4–5)
ALBUMIN/GLOB SERPL: 2.2 {RATIO} (ref 1.1–2.2)
ALP SERPL-CCNC: 55 U/L (ref 40–129)
ALT SERPL-CCNC: 39 U/L (ref 10–40)
ANION GAP SERPL CALCULATED.3IONS-SCNC: 13 MMOL/L (ref 3–16)
AST SERPL-CCNC: 30 U/L (ref 15–37)
BASOPHILS # BLD: 0 K/UL (ref 0–0.2)
BASOPHILS NFR BLD: 0.7 %
BILIRUB SERPL-MCNC: 0.7 MG/DL (ref 0–1)
BUN SERPL-MCNC: 20 MG/DL (ref 7–20)
CALCIUM SERPL-MCNC: 9.3 MG/DL (ref 8.3–10.6)
CHLORIDE SERPL-SCNC: 97 MMOL/L (ref 99–110)
CK SERPL-CCNC: 31 U/L (ref 39–308)
CO2 SERPL-SCNC: 28 MMOL/L (ref 21–32)
CREAT SERPL-MCNC: 1.1 MG/DL (ref 0.8–1.3)
CRP SERPL-MCNC: 11.1 MG/L (ref 0–5.1)
DEPRECATED RDW RBC AUTO: 14.9 % (ref 12.4–15.4)
EOSINOPHIL # BLD: 0.2 K/UL (ref 0–0.6)
EOSINOPHIL NFR BLD: 4.7 %
ERYTHROCYTE [SEDIMENTATION RATE] IN BLOOD BY WESTERGREN METHOD: 12 MM/HR (ref 0–20)
GFR SERPLBLD CREATININE-BSD FMLA CKD-EPI: 70 ML/MIN/{1.73_M2}
GLUCOSE SERPL-MCNC: 117 MG/DL (ref 70–99)
HCT VFR BLD AUTO: 48.8 % (ref 40.5–52.5)
HGB BLD-MCNC: 16.7 G/DL (ref 13.5–17.5)
LYMPHOCYTES # BLD: 1 K/UL (ref 1–5.1)
LYMPHOCYTES NFR BLD: 20.6 %
MCH RBC QN AUTO: 30.3 PG (ref 26–34)
MCHC RBC AUTO-ENTMCNC: 34.3 G/DL (ref 31–36)
MCV RBC AUTO: 88.3 FL (ref 80–100)
MONOCYTES # BLD: 0.5 K/UL (ref 0–1.3)
MONOCYTES NFR BLD: 10.8 %
NEUTROPHILS # BLD: 3.1 K/UL (ref 1.7–7.7)
NEUTROPHILS NFR BLD: 63.2 %
PLATELET # BLD AUTO: 162 K/UL (ref 135–450)
PMV BLD AUTO: 9.9 FL (ref 5–10.5)
POTASSIUM SERPL-SCNC: 4.2 MMOL/L (ref 3.5–5.1)
PROT SERPL-MCNC: 6.4 G/DL (ref 6.4–8.2)
RBC # BLD AUTO: 5.53 M/UL (ref 4.2–5.9)
SODIUM SERPL-SCNC: 138 MMOL/L (ref 136–145)
WBC # BLD AUTO: 4.9 K/UL (ref 4–11)

## 2025-05-14 RX ORDER — DOXYCYCLINE HYCLATE 100 MG
100 TABLET ORAL 2 TIMES DAILY
Qty: 180 TABLET | Refills: 3 | Status: SHIPPED | OUTPATIENT
Start: 2025-05-14

## 2025-05-14 NOTE — TELEPHONE ENCOUNTER
Spoke with patient blood pressure has been fine ,first time reading was that low ,will monitor at home, nurse will be back Friday    Dr Egan notified

## 2025-05-14 NOTE — TELEPHONE ENCOUNTER
Yue from [facility] states that pt BP 95/60 and A Symptomatic Hypo Tension has not taken BP medication today and was advise to hold BP medication and follow up with     [phone] (Yue)  Pls call and advise

## 2025-05-14 NOTE — TELEPHONE ENCOUNTER
Discussed case with Dr. Green and patient-  We will end OPAT as planned.  We will start PO doxycycline 100 mg BID. Wrote for 90 days with 3 refills.     F/u in 3 months with labs.  Orders entered.

## 2025-05-14 NOTE — TELEPHONE ENCOUNTER
Yue from [place] states that pt BP 95/60 and A Symptomatic Hypo Tension has not taken BP medication today and was advise to hold BP medication and follow up with      [phone] (Yue)  Pls call and advise

## 2025-05-15 NOTE — TELEPHONE ENCOUNTER
OPAT End  Call made to pharmacy, spoke with Cruz and gave VO to end IV ATB and remove PICC after last dose today 5/15.  Cruz RBVO correctly    Call made to [facility], spoke with Wendy, and gave VO to remove PICC at tomorrow's visit  Wendy GRIFFITH correctly    Call made to patient, Dr Green spoke with pt yesterday, see below    Will f/u in 1-2 days to verify line removal  5/16/25  1245  Pt confirmed PICC has been removed, will start Doxy later today and pt was seen by in home [facility] podiatry.     [No Acute Distress] : no acute distress [Well Nourished] : well nourished [Well-Appearing] : well-appearing [Well Developed] : well developed [PERRL] : pupils equal round and reactive to light [Normal Sclera/Conjunctiva] : normal sclera/conjunctiva [Normal Oropharynx] : the oropharynx was normal [EOMI] : extraocular movements intact [Normal Outer Ear/Nose] : the outer ears and nose were normal in appearance [No JVD] : no jugular venous distention [No Lymphadenopathy] : no lymphadenopathy [Supple] : supple [Thyroid Normal, No Nodules] : the thyroid was normal and there were no nodules present [No Respiratory Distress] : no respiratory distress  [Normal Rate] : normal rate  [No Accessory Muscle Use] : no accessory muscle use [Clear to Auscultation] : lungs were clear to auscultation bilaterally [Normal S1, S2] : normal S1 and S2 [Regular Rhythm] : with a regular rhythm [No Murmur] : no murmur heard [No Carotid Bruits] : no carotid bruits [No Abdominal Bruit] : a ~M bruit was not heard ~T in the abdomen [Pedal Pulses Present] : the pedal pulses are present [No Varicosities] : no varicosities [No Edema] : there was no peripheral edema [No Palpable Aorta] : no palpable aorta [Soft] : abdomen soft [No Extremity Clubbing/Cyanosis] : no extremity clubbing/cyanosis [Non Tender] : non-tender [No Masses] : no abdominal mass palpated [Non-distended] : non-distended [No HSM] : no HSM [Normal Bowel Sounds] : normal bowel sounds [Normal Posterior Cervical Nodes] : no posterior cervical lymphadenopathy [Normal Anterior Cervical Nodes] : no anterior cervical lymphadenopathy [No CVA Tenderness] : no CVA  tenderness [No Spinal Tenderness] : no spinal tenderness [No Joint Swelling] : no joint swelling [Grossly Normal Strength/Tone] : grossly normal strength/tone [No Rash] : no rash [Coordination Grossly Intact] : coordination grossly intact [No Focal Deficits] : no focal deficits [Normal Gait] : normal gait [Normal Affect] : the affect was normal [Normal Insight/Judgement] : insight and judgment were intact [Deep Tendon Reflexes (DTR)] : deep tendon reflexes were 2+ and symmetric

## 2025-07-18 ENCOUNTER — TELEPHONE (OUTPATIENT)
Dept: INTERNAL MEDICINE CLINIC | Age: 75
End: 2025-07-18

## 2025-07-18 NOTE — TELEPHONE ENCOUNTER
Yue from [facility] is calling pt will be discharged today and his wound is healed   [phone]

## (undated) DEVICE — BLANKET WRM W29.9XL79.1IN UP BODY FORC AIR MISTRAL-AIR

## (undated) DEVICE — Device

## (undated) DEVICE — TROCAR: Brand: KII FIOS FIRST ENTRY

## (undated) DEVICE — DRAPE,LAP,CHOLE,W/TROUGHS,STERILE: Brand: MEDLINE

## (undated) DEVICE — MASTISOL ADHESIVE LIQ 2/3ML

## (undated) DEVICE — ARM DRAPE

## (undated) DEVICE — GUIDEWIRE VASC L150CM DIA0.035IN STR TIP PTFE FIX COR

## (undated) DEVICE — MASK CAPNOGRAPHY AD W35IN DIA58IN SAMP LN L10FT O2 LN

## (undated) DEVICE — PUMP SUC IRR TBNG L10FT W/ HNDPC ASSEMB STRYKEFLOW 2

## (undated) DEVICE — SOLUTION IV 1000ML 0.9% SOD CHL

## (undated) DEVICE — GOWN,SIRUS,POLYRNF,BRTHSLV,XL,30/CS: Brand: MEDLINE

## (undated) DEVICE — COVER LT HNDL BLU PLAS

## (undated) DEVICE — LARGE NEEDLE DRIVER: Brand: ENDOWRIST

## (undated) DEVICE — NEPTUNE E-SEP SMOKE EVACUATION PENCIL, COATED, 70MM BLADE, PUSH BUTTON SWITCH: Brand: NEPTUNE E-SEP

## (undated) DEVICE — PROGRASP FORCEPS: Brand: ENDOWRIST

## (undated) DEVICE — ADHESIVE SKIN CLSR 0.7ML TOP DERMBND ADV

## (undated) DEVICE — GARMENT,MEDLINE,DVT,INT,CALF,LG, GEN2: Brand: MEDLINE

## (undated) DEVICE — NEEDLE INSUF L150MM DIA2MM DISP FOR PNEUMOPERI ENDOPATH

## (undated) DEVICE — TROCAR: Brand: KII OPTICAL ACCESS SYSTEM

## (undated) DEVICE — GAUZE,PACKING STRIP,IODOFORM,1/2"X5YD,ST: Brand: CURAD

## (undated) DEVICE — [HIGH FLOW INSUFFLATOR,  DO NOT USE IF PACKAGE IS DAMAGED,  KEEP DRY,  KEEP AWAY FROM SUNLIGHT,  PROTECT FROM HEAT AND RADIOACTIVE SOURCES.]: Brand: PNEUMOSURE

## (undated) DEVICE — PACLITAXEL-COATED PTA BALLOON CATHETER: Brand: RANGER™

## (undated) DEVICE — BLADELESS OBTURATOR, LONG: Brand: WECK VISTA

## (undated) DEVICE — VESSEL SEALER EXTEND: Brand: ENDOWRIST

## (undated) DEVICE — RADIFOCUS GLIDECATH: Brand: GLIDECATH

## (undated) DEVICE — SUTURE VCRL SZ 4-0 L18IN ABSRB UD L19MM PS-2 3/8 CIR PRIM J496H

## (undated) DEVICE — SPONGE,LAP,4"X18",XR,ST,5/PK,40PK/CS: Brand: MEDLINE INDUSTRIES, INC.

## (undated) DEVICE — VISIGI 3D®  CALIBRATION SYSTEM  SIZE 36FR STD W/ BULB: Brand: BOEHRINGER® VISIGI 3D™ SLEEVE GASTRECTOMY CALIBRATION SYSTEM, SIZE 36FR W/BULB

## (undated) DEVICE — TOWEL,STOP FLAG GOLD N-W: Brand: MEDLINE

## (undated) DEVICE — INJECTOR CNTRST 48 IN NAMIC

## (undated) DEVICE — SYSTEM SMK EVAC LAP TBNG FILTER HSNG BENT STYL PNK SEE CLR

## (undated) DEVICE — STAPLER 60 RELOAD GREEN: Brand: SUREFORM

## (undated) DEVICE — 60 ML SYRINGE,CATHETER TIP: Brand: MONOJECT

## (undated) DEVICE — STAPLER 60: Brand: SUREFORM

## (undated) DEVICE — SUTURE ABSORBABLE MONOFILAMENT 2-0 SH 6 IN STRATAFIX SPRL SXPP1B415

## (undated) DEVICE — BINDER ABD H12IN FOR 62-74IN WAIST UNIV 4 PNL PREM DSGN E

## (undated) DEVICE — TOWEL,OR,DSP,ST,BLUE,DLX,8/PK,10PK/CS: Brand: MEDLINE

## (undated) DEVICE — GOWN,SIRUS,POLYRNF,BRTHSLV,LG,30/CS: Brand: MEDLINE

## (undated) DEVICE — EP VASCULAR PACK: Brand: MEDLINE INDUSTRIES, INC.

## (undated) DEVICE — DESTINATION RENAL GUIDING SHEATH: Brand: DESTINATION

## (undated) DEVICE — CADIERE FORCEPS: Brand: ENDOWRIST

## (undated) DEVICE — SOLUTION ANTIFOG VIS SYS CLEARIFY LAPSCP

## (undated) DEVICE — GLOVE ORANGE PI 7 1/2   MSG9075

## (undated) DEVICE — GLOVE SURG SZ 75 L12IN THK75MIL DK GRN LTX FREE

## (undated) DEVICE — LAPAROSCOPIC SCISSORS: Brand: EPIX LAPAROSCOPIC SCISSORS

## (undated) DEVICE — SURGICAL SET UP - SURE SET: Brand: MEDLINE INDUSTRIES, INC.

## (undated) DEVICE — PERCLOSE™ PROSTYLE™ SUTURE-MEDIATED CLOSURE AND REPAIR SYSTEM: Brand: PERCLOSE™ PROSTYLE™

## (undated) DEVICE — PTA BALLOON DILATATION CATHETER: Brand: STERLING™

## (undated) DEVICE — SYRINGE MED 10ML SLIP TIP BLNT FILL AND LUERLOCK DISP

## (undated) DEVICE — TRAP FLUID

## (undated) DEVICE — APPLICATOR MEDICATED 26 CC SOLUTION HI LT ORNG CHLORAPREP

## (undated) DEVICE — AGENT HEMSTAT W2XL4IN OXIDIZED REGENERATED CELOS ABSRB

## (undated) DEVICE — PROVE COVER: Brand: UNBRANDED

## (undated) DEVICE — HANDPIECE SUCTION TUBING INTERPULSE 10FT

## (undated) DEVICE — HIGH FLOW TIP

## (undated) DEVICE — SUTURE VCRL SZ 2-0 L27IN ABSRB UD L26MM SH 1/2 CIR J417H

## (undated) DEVICE — 40583 XL ADVANCED TRENDELENBURG POSITIONING KIT: Brand: 40583 XL ADVANCED TRENDELENBURG POSITIONING KIT

## (undated) DEVICE — PODIATRY: Brand: MEDLINE INDUSTRIES, INC.

## (undated) DEVICE — CANNULA SEAL

## (undated) DEVICE — SOLUTION IRRIG 3000ML 0.9% SOD CHL USP UROMATIC PLAS CONT

## (undated) DEVICE — RADIFOCUS GLIDEWIRE ADVANTAGE GUIDEWIRE: Brand: GLIDEWIRE ADVANTAGE

## (undated) DEVICE — NEEDLE HYPO 22GA L1.5IN BLK POLYPR HUB S STL REG BVL STR

## (undated) DEVICE — CATHETER ANGIO 5FR L65CM 0.038IN VIS OMNI FLUSH-0 SFT TIP

## (undated) DEVICE — DEVICE CLSR 10/12MM XL PRT SYS SUT PASS ST DISP CARTER

## (undated) DEVICE — ANTI-FOG SOLUTION WITH FOAM PAD: Brand: DEVON

## (undated) DEVICE — SYRINGE MED 10ML TRNSLUC BRL PLUNG BLK MRK POLYPR CTRL

## (undated) DEVICE — STAPLER 60 RELOAD BLUE: Brand: SUREFORM

## (undated) DEVICE — BOWL MED L 32OZ PLAS W/ MOLD GRAD EZ OPN PEEL PCH

## (undated) DEVICE — UNDERGLOVE SURG SZ 8 BLU LTX FREE SYN POLYISOPRENE POLYMER

## (undated) DEVICE — ELECTROSURGICAL PENCIL ROCKER SWITCH NON COATED BLADE ELECTRODE 10 FT (3 M) CORD HOLSTER: Brand: MEGADYNE

## (undated) DEVICE — NAVICROSS SUPPORT CATHETER: Brand: NAVICROSS

## (undated) DEVICE — PLATE ES AD W 9FT CRD 2

## (undated) DEVICE — GLOVE ORANGE PI 7   MSG9070

## (undated) DEVICE — SURE SET-DOUBLE BASIN-LF: Brand: MEDLINE INDUSTRIES, INC.

## (undated) DEVICE — KIT DRSG SM W12.5XH3.2XL18CM VAC SH DRP PD W/ CONN DISP RUL

## (undated) DEVICE — PINNACLE INTRODUCER SHEATH: Brand: PINNACLE

## (undated) DEVICE — SUTURE VCRL SZ 0 L54IN ABSRB VLT W/O NDL POLYGLACTIN 910 J616H

## (undated) DEVICE — JEWISH HOSPITAL TURNOVER KIT: Brand: MEDLINE INDUSTRIES, INC.

## (undated) DEVICE — CANISTER NEG PRSS 500ML WND THER W/ TBNG NO PRSS RANG W/

## (undated) DEVICE — FORCEPS BX L240CM JAW DIA2.8MM L CAP W/ NDL MIC MESH TOOTH